# Patient Record
Sex: FEMALE | Race: WHITE | NOT HISPANIC OR LATINO | Employment: OTHER | URBAN - METROPOLITAN AREA
[De-identification: names, ages, dates, MRNs, and addresses within clinical notes are randomized per-mention and may not be internally consistent; named-entity substitution may affect disease eponyms.]

---

## 2017-05-22 ENCOUNTER — CONVERSION ENCOUNTER (OUTPATIENT)
Dept: OTHER | Facility: HOSPITAL | Age: 52
End: 2017-05-22

## 2018-01-26 ENCOUNTER — OFFICE VISIT CONVERTED (OUTPATIENT)
Dept: FAMILY MEDICINE CLINIC | Age: 53
End: 2018-01-26
Attending: FAMILY MEDICINE

## 2018-06-27 ENCOUNTER — CONVERSION ENCOUNTER (OUTPATIENT)
Dept: OTHER | Facility: HOSPITAL | Age: 53
End: 2018-06-27

## 2018-06-27 ENCOUNTER — OFFICE VISIT CONVERTED (OUTPATIENT)
Dept: FAMILY MEDICINE CLINIC | Age: 53
End: 2018-06-27
Attending: FAMILY MEDICINE

## 2019-04-10 ENCOUNTER — OFFICE VISIT CONVERTED (OUTPATIENT)
Dept: FAMILY MEDICINE CLINIC | Age: 54
End: 2019-04-10
Attending: FAMILY MEDICINE

## 2019-04-10 ENCOUNTER — HOSPITAL ENCOUNTER (OUTPATIENT)
Dept: OTHER | Facility: HOSPITAL | Age: 54
Discharge: HOME | End: 2019-04-10
Attending: FAMILY MEDICINE

## 2019-04-10 LAB
ALBUMIN SERPL-MCNC: 4.3 G/DL (ref 3.5–5)
ALBUMIN/GLOB SERPL: 1.4 {RATIO} (ref 1.4–2.6)
ALP SERPL-CCNC: 98 U/L (ref 53–141)
ALT SERPL-CCNC: 14 U/L (ref 10–40)
ANION GAP SERPL CALC-SCNC: 16 MMOL/L (ref 8–19)
AST SERPL-CCNC: 16 U/L (ref 15–50)
BILIRUB SERPL-MCNC: 0.29 MG/DL (ref 0.2–1.3)
BUN SERPL-MCNC: 13 MG/DL (ref 5–25)
BUN/CREAT SERPL: 16 {RATIO} (ref 6–20)
CALCIUM SERPL-MCNC: 8.9 MG/DL (ref 8.7–10.4)
CHLORIDE SERPL-SCNC: 101 MMOL/L (ref 99–111)
CONV CO2: 27 MMOL/L (ref 22–32)
CONV TOTAL PROTEIN: 7.3 G/DL (ref 6.3–8.2)
CREAT UR-MCNC: 0.81 MG/DL (ref 0.5–0.9)
GFR SERPLBLD BASED ON 1.73 SQ M-ARVRAT: >60 ML/MIN/{1.73_M2}
GLOBULIN UR ELPH-MCNC: 3 G/DL (ref 2–3.5)
GLUCOSE SERPL-MCNC: 93 MG/DL (ref 65–99)
OSMOLALITY SERPL CALC.SUM OF ELEC: 288 MOSM/KG (ref 273–304)
POTASSIUM SERPL-SCNC: 4.5 MMOL/L (ref 3.5–5.3)
SODIUM SERPL-SCNC: 139 MMOL/L (ref 135–147)

## 2019-05-03 ENCOUNTER — HOSPITAL ENCOUNTER (OUTPATIENT)
Dept: PHYSICAL THERAPY | Facility: CLINIC | Age: 54
Setting detail: RECURRING SERIES
Discharge: HOME | End: 2019-09-16
Attending: FAMILY MEDICINE

## 2019-06-19 ENCOUNTER — OFFICE VISIT CONVERTED (OUTPATIENT)
Dept: OTOLARYNGOLOGY | Facility: CLINIC | Age: 54
End: 2019-06-19
Attending: OTOLARYNGOLOGY

## 2019-07-16 ENCOUNTER — HOSPITAL ENCOUNTER (OUTPATIENT)
Dept: OTHER | Facility: HOSPITAL | Age: 54
Discharge: HOME | End: 2019-07-16
Attending: FAMILY MEDICINE

## 2019-09-25 ENCOUNTER — OFFICE VISIT CONVERTED (OUTPATIENT)
Dept: OTOLARYNGOLOGY | Facility: CLINIC | Age: 54
End: 2019-09-25
Attending: OTOLARYNGOLOGY

## 2019-12-10 ENCOUNTER — OFFICE VISIT CONVERTED (OUTPATIENT)
Dept: FAMILY MEDICINE CLINIC | Age: 54
End: 2019-12-10
Attending: FAMILY MEDICINE

## 2019-12-10 ENCOUNTER — HOSPITAL ENCOUNTER (OUTPATIENT)
Dept: OTHER | Facility: HOSPITAL | Age: 54
Discharge: HOME | End: 2019-12-10
Attending: FAMILY MEDICINE

## 2019-12-10 LAB
ALBUMIN SERPL-MCNC: 4.2 G/DL (ref 3.5–5)
ALBUMIN/GLOB SERPL: 1.4 {RATIO} (ref 1.4–2.6)
ALP SERPL-CCNC: 106 U/L (ref 53–141)
ALT SERPL-CCNC: 17 U/L (ref 10–40)
ANION GAP SERPL CALC-SCNC: 14 MMOL/L (ref 8–19)
AST SERPL-CCNC: 18 U/L (ref 15–50)
BILIRUB SERPL-MCNC: 0.48 MG/DL (ref 0.2–1.3)
BUN SERPL-MCNC: 8 MG/DL (ref 5–25)
BUN/CREAT SERPL: 10 {RATIO} (ref 6–20)
CALCIUM SERPL-MCNC: 9.3 MG/DL (ref 8.7–10.4)
CHLORIDE SERPL-SCNC: 102 MMOL/L (ref 99–111)
CONV CO2: 26 MMOL/L (ref 22–32)
CONV TOTAL PROTEIN: 7.2 G/DL (ref 6.3–8.2)
CREAT UR-MCNC: 0.81 MG/DL (ref 0.5–0.9)
GFR SERPLBLD BASED ON 1.73 SQ M-ARVRAT: >60 ML/MIN/{1.73_M2}
GLOBULIN UR ELPH-MCNC: 3 G/DL (ref 2–3.5)
GLUCOSE SERPL-MCNC: 100 MG/DL (ref 65–99)
OSMOLALITY SERPL CALC.SUM OF ELEC: 284 MOSM/KG (ref 273–304)
POTASSIUM SERPL-SCNC: 4 MMOL/L (ref 3.5–5.3)
SODIUM SERPL-SCNC: 138 MMOL/L (ref 135–147)

## 2020-04-09 ENCOUNTER — OFFICE VISIT CONVERTED (OUTPATIENT)
Dept: FAMILY MEDICINE CLINIC | Age: 55
End: 2020-04-09
Attending: FAMILY MEDICINE

## 2020-06-09 ENCOUNTER — OFFICE VISIT CONVERTED (OUTPATIENT)
Dept: FAMILY MEDICINE CLINIC | Age: 55
End: 2020-06-09
Attending: FAMILY MEDICINE

## 2020-07-15 ENCOUNTER — OFFICE VISIT CONVERTED (OUTPATIENT)
Dept: FAMILY MEDICINE CLINIC | Age: 55
End: 2020-07-15
Attending: FAMILY MEDICINE

## 2020-12-02 ENCOUNTER — HOSPITAL ENCOUNTER (OUTPATIENT)
Dept: OTHER | Facility: HOSPITAL | Age: 55
Discharge: HOME | End: 2020-12-02
Attending: FAMILY MEDICINE

## 2021-03-11 ENCOUNTER — OFFICE VISIT CONVERTED (OUTPATIENT)
Dept: FAMILY MEDICINE CLINIC | Age: 56
End: 2021-03-11
Attending: FAMILY MEDICINE

## 2021-04-01 ENCOUNTER — HOSPITAL ENCOUNTER (OUTPATIENT)
Dept: OTHER | Facility: HOSPITAL | Age: 56
Discharge: HOME | End: 2021-04-01
Attending: FAMILY MEDICINE

## 2021-04-01 LAB
25(OH)D3 SERPL-MCNC: 38.4 NG/ML (ref 30–100)
ALBUMIN SERPL-MCNC: 4.3 G/DL (ref 3.5–5)
ALBUMIN/GLOB SERPL: 1.4 {RATIO} (ref 1.4–2.6)
ALP SERPL-CCNC: 103 U/L (ref 53–141)
ALT SERPL-CCNC: 21 U/L (ref 10–40)
ANION GAP SERPL CALC-SCNC: 13 MMOL/L (ref 8–19)
AST SERPL-CCNC: 18 U/L (ref 15–50)
BILIRUB SERPL-MCNC: 0.26 MG/DL (ref 0.2–1.3)
BUN SERPL-MCNC: 11 MG/DL (ref 5–25)
BUN/CREAT SERPL: 15 {RATIO} (ref 6–20)
CALCIUM SERPL-MCNC: 9.4 MG/DL (ref 8.7–10.4)
CHLORIDE SERPL-SCNC: 102 MMOL/L (ref 99–111)
CHOLEST SERPL-MCNC: 169 MG/DL (ref 107–200)
CHOLEST/HDLC SERPL: 1.9 {RATIO} (ref 3–6)
CONV CO2: 27 MMOL/L (ref 22–32)
CONV TOTAL PROTEIN: 7.3 G/DL (ref 6.3–8.2)
CREAT UR-MCNC: 0.71 MG/DL (ref 0.5–0.9)
GFR SERPLBLD BASED ON 1.73 SQ M-ARVRAT: >60 ML/MIN/{1.73_M2}
GLOBULIN UR ELPH-MCNC: 3 G/DL (ref 2–3.5)
GLUCOSE SERPL-MCNC: 90 MG/DL (ref 65–99)
HDLC SERPL-MCNC: 88 MG/DL (ref 40–60)
LDLC SERPL CALC-MCNC: 36 MG/DL (ref 70–100)
OSMOLALITY SERPL CALC.SUM OF ELEC: 285 MOSM/KG (ref 273–304)
POTASSIUM SERPL-SCNC: 4.3 MMOL/L (ref 3.5–5.3)
SODIUM SERPL-SCNC: 138 MMOL/L (ref 135–147)
TRIGL SERPL-MCNC: 226 MG/DL (ref 40–150)
VLDLC SERPL-MCNC: 45 MG/DL (ref 5–37)

## 2021-05-15 VITALS
WEIGHT: 174.37 LBS | HEIGHT: 63 IN | TEMPERATURE: 97.3 F | OXYGEN SATURATION: 97 % | HEART RATE: 90 BPM | BODY MASS INDEX: 30.89 KG/M2 | RESPIRATION RATE: 18 BRPM | SYSTOLIC BLOOD PRESSURE: 112 MMHG | DIASTOLIC BLOOD PRESSURE: 60 MMHG

## 2021-05-15 VITALS
HEART RATE: 88 BPM | OXYGEN SATURATION: 97 % | SYSTOLIC BLOOD PRESSURE: 116 MMHG | WEIGHT: 175 LBS | DIASTOLIC BLOOD PRESSURE: 53 MMHG | RESPIRATION RATE: 18 BRPM | TEMPERATURE: 97.9 F | HEIGHT: 63 IN | BODY MASS INDEX: 31.01 KG/M2

## 2021-05-18 NOTE — PROGRESS NOTES
Vanesa Esquivel  1965     Office/Outpatient Visit    Visit Date: Tue, Dec 10, 2019 11:05 am    Provider: Fina Bardales MD (Assistant: Janel Campuzano MA)    Location: [facility]        Electronically signed by Fina Bardales MD on  12/11/2019 10:49:52 AM                             Subjective:        CC: Ms. Esquivel is a 54 year old White female.  This is a follow-up visit.          HPI:       Vanesa is following up for her fibromyalgia and depression, she has chronic pain and depression, she is overall doing better, worried that the citalopram is making her memory worse.  She fatigues easily still.  Denies crying spells, feelings of guilt and worthlessness, suicidal ideation.  Her anxiety is stable.      chronic LBP and OA joint pain, she is overall stable and doing ok, on CBD oil and this is helping      eyes are itching B, onset yesterday at a house she is renevating, a lot of dust    ROS:     CONSTITUTIONAL:  Positive for fatigue and unintentional weight gain.   Negative for chills or fever.      EYES:  Negative for blurred vision.      E/N/T:  Positive for tinnitus and choking.   Negative for nasal congestion, frequent rhinorrhea or sore throat.      CARDIOVASCULAR:  Positive for palpitations ( (fluttering) ).   Negative for chest pain.      RESPIRATORY:  Negative for recent cough and dyspnea.      GASTROINTESTINAL:  Positive for acid reflux symptoms.   Negative for constipation, diarrhea, nausea or vomiting.      GENITOURINARY:  Negative for dysuria and vaginal discharge.      INTEGUMENTARY/BREAST:  Negative for rash.      NEUROLOGICAL:  Positive for dizziness, headaches and vertigo.      PSYCHIATRIC:  Positive for sleep disturbance.   Negative for anxiety or depression.          Past Medical History / Family History / Social History:         Last Reviewed on 12/10/2019 11:20 AM by Fina Bardales    Past Medical History: Fibromyalgia, GERD, chronic LBP, depression              PREVENTIVE HEALTH MAINTENANCE             MAMMOGRAM: Done within last 2 years and results in are chart was last done 7-17-19 with normal results         Surgical History:         Hysterectomy: (abn PAP);         Family History:         Positive for Myocardial Infarction ( mat. GM );     Positive for Cancer- type not specified ( pat. GF; mat. GF );     Positive for Cerebrovascular Accident ( pat. GM );         Social History:         Marital Status:      Children: 3 children and 2 step-children         Tobacco/Alcohol/Supplements:     Last Reviewed on 12/10/2019 11:10 AM by Janel Campuzano    Tobacco: She has never smoked.          Allergies:     Last Reviewed on 4/10/2019 01:28 PM by Janel Campuzano    No Known Allergies.        Current Medications:     Last Reviewed on 4/10/2019 01:29 PM by Janel Campuzano    Mucinex Maximum Strength 1,200mg Tablets, Extended Release [1 tab bid]    flax seed oil 1 QD     Fish Oil     vitamin E mixed 400 unit oral tablet [1 tab daily]    Pantoprazole 40 mg oral tablet, delayed release (enteric coated) [1 po q day]    citalopram 40 mg oral tablet [1 tab daily]    Benadryl Allergy 25 mg oral tablet [1 at hs]    CBD oil q day         Objective:        Vitals:         Current: 12/10/2019 11:14:37 AM    Ht:  5 ft, 4.5 in;  Wt: 171 lbs;  BMI: 28.9T: 98 F (oral);  BP: 95/46 mm Hg (left arm, sitting);  P: 85 bpm (left arm (BP Cuff), sitting);  sCr: 0.81 mg/dL;  GFR: 84.63        Repeat:     11:16:12 AM  BP:   105/41mm Hg (left arm, sitting, HR)     Exams:     PHYSICAL EXAM:     GENERAL:  well developed and nourished; appropriately groomed; in no apparent distress;     EYES: PERRL, EOMI     E/N/T:  normal EACs, TMs, nasal/oral mucosa, teeth, gingiva, and oropharynx;     NECK: range of motion is normal;     RESPIRATORY: normal respiratory rate and pattern with no distress; normal breath sounds with no rales, rhonchi, wheezes or rubs;     CARDIOVASCULAR: regular rate and rhythm;  normal S1, S2; no murmur, rub, or gallop; normal PMI;     BREAST/INTEGUMENT: smalll atypical erythematous lesion on tip of her nose;     MUSCULOSKELETAL: normal gait; muscle strength: 5/5 in all major muscle groups;  normal overall tone neg straight leg raise     NEUROLOGIC: mental status: oriented to person, place, and time;  cranial nerves II-XII grossly intact; Reflexes: knee jerks: 3+;     PSYCHIATRIC:  appropriate affect and demeanor; normal speech pattern; grossly normal memory;         Assessment:         F32.0   Major depressive disorder, single episode, mild       M54.5   Low back pain       K21.9   Gastro-esophageal reflux disease without esophagitis       M15.0   Primary generalized (osteo)arthritis       M79.7   Fibromyalgia       H10.13   Acute atopic conjunctivitis, bilateral       L29.8   Other pruritus           ORDERS:         Meds Prescribed:       [Recorded] Lexapro 20 mg oral tablet [take 1/2  tablet (20 mg) by oral route once daily week 1 then increase to 1 pill daily]       [Refilled] Lexapro 20 mg oral tablet [take 1/2  tablet (20 mg) by oral route once daily week 1 then increase to 1 pill daily], #90 (ninety) tablets, Refills: 0 (zero)         Lab Orders:       [id number]  COMP - [facility] Comp. Metabolic Panel  (Send-Out)                      Plan:         Major depressive disorder, single episode, mildoverall chronic and stableshe is worried her med is causing her memory issues, I dont think so, but will change to lexapro and see if this improves          Prescriptions:       [Recorded] Lexapro 20 mg oral tablet [take 1/2  tablet (20 mg) by oral route once daily week 1 then increase to 1 pill daily]       [Refilled] Lexapro 20 mg oral tablet [take 1/2  tablet (20 mg) by oral route once daily week 1 then increase to 1 pill daily], #90 (ninety) tablets, Refills: 0 (zero)         Low back painchronic LBP and OA joint pain, she is overall stable and doing ok, on CBD oil and this is helping         Gastro-esophageal reflux disease without esophagitisoff all meds        Primary generalized (osteo)arthritischronic LBP and OA joint pain, she is overall stable and doing ok, on CBD oil and this is helping        Fibromyalgiaoverall chronic and stable        Acute atopic conjunctivitis, bilateralWILL START ALLERGY DROP, she is to go to optometrist for full exam if she has vision loss or eye pain        Other prurituswill check liver labs, she should change her soap to moisturizing soap, and dye/perfume free detergents. good lotion like eucerin or aveeno    LABORATORY:  Labs ordered to be performed today include Comprehensive metabolic panel.            Orders:       [id number]  COMP - [facility] Comp. Metabolic Panel  (Send-Out)                  Charge Capture:         Primary Diagnosis:     F32.0  Major depressive disorder, single episode, mild           Orders:      [id number]  Office/outpatient visit; established patient, level 4  (In-House)              M54.5  Low back pain     K21.9  Gastro-esophageal reflux disease without esophagitis     M15.0  Primary generalized (osteo)arthritis     M79.7  Fibromyalgia     H10.13  Acute atopic conjunctivitis, bilateral     L29.8  Other pruritus

## 2021-05-18 NOTE — PROGRESS NOTES
Vanesa Esquivel 1965     Office/Outpatient Visit    Visit Date: Fri, Jan 26, 2018 02:09 pm    Provider: Fina Bardales MD (Assistant: Bozena Bailey RN)    Location: [facility]        Electronically signed by Fina Bardales MD on  01/31/2018 03:14:45 PM                             SUBJECTIVE:        CC: fibromyalgia follow up         HPI:     GERD is overall stable on protonix     Vanesa is in today to follow up on her chronic fibromyalgia/LBP and neck pain.  She takes celexa and flexeril (off this med lately and at nighttime only), with prn tylenol, and she is overall stable and doing well.   Failed treatments include:  savella, zoloft, cymbalta, pristiq, wellbutrin, gabapentin, lyrica, trazodone and muccinex.   Her depression is a little worse, she recently loss her father and dog, so she has been grieving.  She has rare crying spells.   Motivation is fine.  She is sleeping ok with benadryl OTC.  She denies suicidal thoughts.     chronic eczema and this is getting worse with dry skin, leona in trunk area, last cream I gave her didnt help and even burned, she uses moisturizing soaps and OTC lotions routinely.  I recommend dye and perfume free clothes detergent.     ROS:     CONSTITUTIONAL:  Negative for chills and fever.      EYES:  Negative for blurred vision, eye pain, and photophobia.      CARDIOVASCULAR:  Negative for chest pain, dizziness and palpitations.      RESPIRATORY:  Negative for cough, dyspnea, and hemoptysis.      GASTROINTESTINAL:  Positive for nausea.   Negative for abdominal pain, constipation, diarrhea or vomiting.      MUSCULOSKELETAL:  Negative for arthralgias, back pain, and myalgias.      NEUROLOGICAL:  Negative for dizziness, headaches, paresthesias, and weakness.      PSYCHIATRIC:  Negative for sleep disturbance and suicidal thoughts.          PMH/FMH/SH:     Last Reviewed on 5/22/2017 01:07 PM by Fina Bardales    Past Medical History: Fibromyalgia, GERD, chronic LBP,  depression         Surgical History:         Hysterectomy: (abn PAP);         Family History:         Positive for Myocardial Infarction ( mat. GM );     Positive for Cancer- type not specified ( pat. GF; mat. GF );     Positive for Cerebrovascular Accident ( pat. GM );         Social History:         Marital Status:      Children: 3 children and 2 step-children         Tobacco/Alcohol/Supplements:     Last Reviewed on 5/22/2017 01:07 PM by Fina Bardales    Tobacco: She has never smoked.              Allergies:     Last Reviewed on 1/26/2018 02:13 PM by Bozena Bailey      No Known Drug Allergies.         Current Medications:     Last Reviewed on 1/26/2018 02:13 PM by Bozena Bailey    Mucinex Maximum Strength 1,200mg Tablets, Extended Release 1 tab bid     Citalopram Hydrobromide 40mg Tablet 1 1/2 tab daily     Pantoprazole 40mg Tablets, Delayed Release 1 po q day     Claritin Allergy 24 hr     Fish Oil     Vitamin E 400IU Tablets 1 tab daily     flax seed oil 1 QD         OBJECTIVE:        Vitals:         Current: 1/26/2018 2:13:07 PM    Ht:  5 ft, 4.5 in;  Wt: 172 lbs;  BMI: 29.1    T: 97.9 F (oral);  BP: 115/70 mm Hg (left arm, sitting);  P: 97 bpm (left arm (BP Cuff), sitting);  sCr: 0.68 mg/dL;  GFR: 103.32        Exams:     PHYSICAL EXAM:     GENERAL:  well developed and nourished; appropriately groomed; in no apparent distress;     EYES: PERRL, EOMI     E/N/T:  normal EACs, TMs, nasal/oral mucosa, teeth, gingiva, and oropharynx;     NECK: range of motion is normal;     RESPIRATORY: CTA B WITHOUT WHEEZING/RALES OR RHONCHI, NORMAL RESP. EFFORT     CARDIOVASCULAR: regular rate and rhythm; normal S1, S2; no murmur, rub, or gallop; normal PMI;     MUSCULOSKELETAL: muscle strength: 5/5 in all major muscle groups;  normal overall tone     skin-dry on back and abdomen with erythema and thickening in certain sections of abdomen     NEUROLOGICAL:  cranial nerves, motor and sensory function, reflexes,  gait and coordination are all intact;     PSYCHIATRIC:  appropriate affect and demeanor; normal speech pattern; grossly normal memory;         ASSESSMENT           724.2   M54.5  Low back pain              DDx:     530.81   K21.9  GERD              DDx:     296.22   F32.0  Moderate depression              DDx:     729.1   M79.7  Fibromyalgia              DDx:     691.8   L20.89  Eczema              DDx:         ORDERS:         Meds Prescribed:       Refill of: Citalopram Hydrobromide 40mg Tablet 1 1/2 tab daily  #135 ([address] [place] [address]) tablet(s) Refills: 1       Refill of: Pantoprazole 40mg Tablets, Delayed Release 1 po q day  #90 (Ninety) tablet(s) Refills: 1       Refill of: Betamethasone Diproprionate, Augmented 0.05% Cream Apply thin film to affected area bid  #45 (Forty Five) gm Refills: 0       Refill of: Mucinex Maximum Strength (Guaifenesin) 1,200mg Tablets, Extended Release 1 tab bid  #60 (Sixty) tablet(s) Refills: 5         Lab Orders:       [id number]  [facility] Comp. Metabolic Panel  (Send-Out)           Other Orders:       [id number]  Behav assmt w/score & docd/stand instrument  (In-House)                   PLAN:          Low back pain stable on prn flexeril           Prescriptions:       Refill of: Betamethasone Diproprionate, Augmented 0.05% Cream Apply thin film to affected area bid  #45 ([place]) gm Refills: 0       Refill of: Mucinex Maximum Strength (Guaifenesin) 1,200mg Tablets, Extended Release 1 tab bid  #60 (Sixty) tablet(s) Refills: 5          GERD stable on protonix          Moderate depression     LABORATORY:  Labs ordered to be performed today include Comprehensive metabolic panel.  [facility] PHQ-9 Depression Screening: Completed form scanned and in chart; Total Score 8/27           Orders:       [id number]  [facility] Comp. Metabolic Panel  (Send-Out)         [id number]  Behav assmt w/score & docd/stand instrument  (In-House)             Patient Education Handouts:       Mental Health and Substance  Abuse Services in [facility]           Fibromyalgia she thinks she is stable at this time, cont current meds          Eczema she uses moisturizing soaps and OTC lotions routinely.  I recommend dye and perfume free clothes detergent.  She was worse on lac hydrins, so I will try a different topical steroid-betamethazone           Prescriptions:       Refill of: Citalopram Hydrobromide 40mg Tablet 1 1/2 tab daily  #135 ([address] [place] [address]) tablet(s) Refills: 1       Refill of: Pantoprazole 40mg Tablets, Delayed Release 1 po q day  #90 (Ninety) tablet(s) Refills: 1             CHARGE CAPTURE           **Please note: ICD descriptions below are intended for billing purposes only and may not represent clinical diagnoses**        Primary Diagnosis:         724.2 Low back pain            M54.5    Low back pain              Orders:          [id number]   Office/outpatient visit; established patient, level 4  (In-House)           530.81 GERD            K21.9    Gastro-esophageal reflux disease without esophagitis    296.22 Moderate depression            F32.0    Major depressive disorder, single episode, mild              Orders:          [id number]   Behav assmt w/score & docd/stand instrument  (In-House)           729.1 Fibromyalgia            M79.7    Fibromyalgia    691.8 Eczema            L20.89    Other atopic dermatitis

## 2021-05-18 NOTE — PROGRESS NOTES
Vanesa Esquivel 1965     Office/Outpatient Visit    Visit Date: Wed, Apr 10, 2019 01:27 pm    Provider: Fina Bardales MD (Assistant: Janel Campuzano MA)    Location: [facility]        Electronically signed by Fina Bardales MD on  04/18/2019 04:16:33 PM                             SUBJECTIVE:        CC:     Ms. Esquivel is a 53 year old White female.  This is a follow-up visit.          HPI:     Saw Dr Garay for back adjustment back in July and has seen him multiple times since. Helps some but pain is still severe. Low back pain and neck pain- 8/10 when it flares. Wondering about surgical options.  She states she cant take ibuprofen (due to kidney insufficiency) or tylenol (upsets her stopmach).  She is wanting to get surgery.        Fibromyalgia pain more under control. 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 Depression has been better.     Had barium swallow last week. Did not see any obvious source of blockage. Still choking once a week- can be any food or drinks.      Still taking pantoprazole for acid reflux which helps with symptoms. Wondering about H pylori testing. Feels like the problem is more with her stomach- has 1-3 BMs per day. No diarrhea.            Tinnitus off and on for years, mostly at night. Also had some vertigo/dizziness a few months ago, which was very scary.                  Scalp lesion seems to have gone away. Also has a concerning red spot on her nose.     ROS:     CONSTITUTIONAL:  Positive for fatigue and unintentional weight gain.   Negative for chills or fever.      EYES:  Negative for blurred vision.      E/N/T:  Positive for tinnitus and choking.   Negative for nasal congestion, frequent rhinorrhea or sore throat.      CARDIOVASCULAR:  Positive for palpitations ( (fluttering) ).   Negative for chest pain.      RESPIRATORY:  Negative for recent cough and dyspnea.      GASTROINTESTINAL:  Positive for acid reflux symptoms.   Negative for constipation,  diarrhea, nausea or vomiting.      GENITOURINARY:  Negative for dysuria and vaginal discharge.      INTEGUMENTARY/BREAST:  Negative for rash.      NEUROLOGICAL:  Positive for dizziness, headaches and vertigo.      PSYCHIATRIC:  Positive for sleep disturbance.   Negative for anxiety or depression.          PMH/FMH/SH:     Last Reviewed on 4/10/2019 02:15 PM by Fina Bardales    Past Medical History: Fibromyalgia, GERD, chronic LBP, depression             PREVENTIVE HEALTH MAINTENANCE             MAMMOGRAM: Done within last 2 years and results in are chart was last done 6-28-18         Surgical History:         Hysterectomy: (abn PAP);         Family History:         Positive for Myocardial Infarction ( mat. GM );     Positive for Cancer- type not specified ( pat. GF; mat. GF );     Positive for Cerebrovascular Accident ( pat. GM );         Social History:         Marital Status:      Children: 3 children and 2 step-children         Tobacco/Alcohol/Supplements:     Last Reviewed on 4/10/2019 02:15 PM by Fina Bardales    Tobacco: She has never smoked.              Allergies:     Last Reviewed on 6/27/2018 02:43 PM by Kellee Perry      No Known Drug Allergies.         Current Medications:     Last Reviewed on 6/27/2018 02:44 PM by Kellee Perry    Citalopram Hydrobromide 40mg Tablet 1 tab daily     Pantoprazole 40mg Tablets, Delayed Release 1 po q day     Mucinex Maximum Strength 1,200mg Tablets, Extended Release 1 tab bid     Benadryl Allergy 25mg Tablet 1 at hs     Fish Oil     Vitamin E 400IU Tablets 1 tab daily     flax seed oil 1 QD         OBJECTIVE:        Vitals:         Current: 4/10/2019 1:31:52 PM    Ht:  5 ft, 4.5 in;  Wt: 178.6 lbs;  BMI: 30.2    T: 98.1 F (oral);  BP: 110/53 mm Hg (right arm, sitting);  P: 82 bpm (right arm (BP Cuff), sitting);  sCr: 0.61 mg/dL;  GFR: 115.76        Exams:     PHYSICAL EXAM:     GENERAL:  well developed and nourished; appropriately groomed;  in no apparent distress;     EYES: PERRL, EOMI     E/N/T:  normal EACs, TMs, nasal/oral mucosa, teeth, gingiva, and oropharynx;     NECK: range of motion is normal;     RESPIRATORY: CTA B WITHOUT WHEEZING/RALES OR RHONCHI, NORMAL RESP. EFFORT     CARDIOVASCULAR: regular rate and rhythm; normal S1, S2; no murmur, rub, or gallop; normal PMI;     BREAST/INTEGUMENT: smalll atypical erythematous lesion on tip of her nose;     MUSCULOSKELETAL: normal gait; muscle strength: 5/5 in all major muscle groups;  normal overall tone neg straight leg raise     NEUROLOGIC: mental status: oriented to person, place, and time;  cranial nerves II-XII grossly intact; Reflexes: knee jerks: 3+;     PSYCHIATRIC:  appropriate affect and demeanor; normal speech pattern; grossly normal memory;         ASSESSMENT           724.2   M54.5  Low back pain              DDx:     300.4   F41.8  Anxiety with depression              DDx:     787.29   R13.19  Other dysphagia              DDx:     729.1   M79.7  Fibromyalgia              DDx:     530.81   K21.9  GERD              DDx:     388.30   H93.19  Tinnitus              DDx:     238.2   L73.8  Atypical skin lesion              DDx:     723.1   M54.2  Neck pain              DDx:         ORDERS:         Radiology/Test Orders:       [id number]  Radiologic examination, spine, cervical; minimum of four views  (Send-Out)         [id number]  Radiologic examination, spine, lumbosacral;  minimum of four views  (Send-Out)         [id number]  Radiologic examination, spine; thoracic, three views  (Send-Out)           Lab Orders:       [id number]  COMP - [facility] Comp. Metabolic Panel  (Send-Out)         74223  HPUBT - [facility] H.pylori Breath test  (Send-Out)           Procedures Ordered:       REFER  Referral to Specialist or Other Facility  (Send-Out)                   PLAN:          Low back pain referal for PT and xrays ordered,, PE c/w arthritis         RADIOLOGY:  I have ordered a C-Spine x-ray series, Lumbar/Sacral Spine X-ray, and  Thoracic Spine to be done today.      REFERRALS:  Referral initiated to physical therapy ( [facility] ).            Orders:       [id number]  Radiologic examination, spine, cervical; minimum of four views  (Send-Out)         45251  Radiologic examination, spine, lumbosacral;  minimum of four views  (Send-Out)         42030  Radiologic examination, spine; thoracic, three views  (Send-Out)         REFER  Referral to Specialist or Other Facility  (Send-Out)            Anxiety with depression stable at this time, she is on citalopram and doing ok, not sleeping well          Other dysphagia normal swallow study per pt, and normal EGD 2017         REFERRALS:  Referral initiated to an E/N/T ( Dr. Chinedu Woodall, [facility] ).           Fibromyalgia stable on meds          GERD     LABORATORY:  Labs ordered to be performed today include Comprehensive metabolic panel and H.pylori urea breath test ([facility]).            Orders:       [id number]  COMP - [facility] Comp. Metabolic Panel  (Send-Out)         [id number]  HPUBT - [facility] H.pylori Breath test  (Send-Out)            Tinnitus eval with ENT          Atypical skin lesion small atypical red spot on tip of nose-referal to derm         REFERRALS:  Referral initiated to a dermatologist ( Dr. Amparo Briones ).           Neck pain referal for PT and xray             CHARGE CAPTURE           **Please note: ICD descriptions below are intended for billing purposes only and may not represent clinical diagnoses**        Primary Diagnosis:         724.2 Low back pain            M54.5    Low back pain              Orders:          [id number]   Office/outpatient visit; established patient, level 4  (In-House)           300.4 Anxiety with depression            F41.8    Other specified anxiety disorders    787.29 Other dysphagia            R13.19    Other dysphagia    729.1 Fibromyalgia            M79.7    Fibromyalgia    530.81 GERD            K21.9    Gastro-esophageal reflux disease without  esophagitis    388.30 Tinnitus            H93.19    Tinnitus, unspecified ear    238.2 Atypical skin lesion            L73.8    Other specified follicular disorders    723.1 Neck pain            M54.2    Cervicalgia

## 2021-05-18 NOTE — PROGRESS NOTES
Vanesa Esquivel  1965     Office/Outpatient Visit    Visit Date: Wed, Jul 15, 2020 11:18 am    Provider: Fina Bardales MD (Assistant: Janel Campuzano MA)    Location: [facility]        Electronically signed by Fina Bardales MD on  07/21/2020 02:13:07 PM                             Subjective:        CC: Ms. Esquivel is a 55 year old White female.  She presents with raw throat, swelling in throat, pressure in chest, choking when eating, lump in the roof of mouth, abnormal heart beat.          HPI:       she feels like she has a lump in the roof of her mouth with a soreness, and ongoing choking.  She is on nexium 20 mg daily and still feels like she has acid in back of her throat.  Her swallow study done in  was all ok.  She also is having intermittent fast heart rate with palpitations.  She has chronic anxiety and she is overall stable and coping well.  States she is stressed over covid and her daughters who are working on the front line.    ROS:     CONSTITUTIONAL:  Positive for fatigue and unintentional weight gain.   Negative for chills or fever.      E/N/T:  Positive for choking.   Negative for ear pain, nasal congestion, frequent rhinorrhea or sore throat.      CARDIOVASCULAR:  Positive for palpitations ( (fluttering) ).   Negative for chest pain.      RESPIRATORY:  Negative for recent cough and dyspnea.      GASTROINTESTINAL:  Positive for acid reflux symptoms.   Negative for constipation, diarrhea, nausea or vomiting.      INTEGUMENTARY/BREAST:  Negative for rash.      PSYCHIATRIC:  Positive for sleep disturbance.   Negative for anxiety or depression.          Past Medical History / Family History / Social History:         Last Reviewed on 7/15/2020 11:45 AM by Fina Bardales    Past Medical History: Fibromyalgia, GERD, chronic LBP, depression             PREVENTIVE HEALTH MAINTENANCE             MAMMOGRAM: Done within last 2 years and results in are chart was last done 7-17-19  with normal results         Surgical History:         Hysterectomy: (abn PAP);         Family History:         Positive for Myocardial Infarction ( mat. GM );     Positive for Cancer- type not specified ( pat. GF; mat. GF );     Positive for Cerebrovascular Accident ( pat. GM );         Social History:         Marital Status:      Children: 3 children and 2 step-children         Tobacco/Alcohol/Supplements:     Last Reviewed on 7/15/2020 11:22 AM by Janel Campuzano    Tobacco: She has never smoked.          Allergies:     Last Reviewed on 6/09/2020 11:27 AM by Leni Doyle    No Known Allergies.        Current Medications:     Last Reviewed on 6/09/2020 11:27 AM by Leni Doyle    flax seed oil 1 QD     Benadryl Allergy 25 mg oral tablet [1 at hs]    CBD oil q day     ALCLOMETASONE 0.05% CREAM 45GM  [APPLY TO AREA QD AS NEEDED]    escitalopram oxalate 20 mg oral tablet [1 TABLET BY MOUTH EVERY DAY]    colestipoL 1 gram oral tablet [take 1 tablet (1 gram) by oral route bid]    esomeprazole magnesium 40 mg oral capsule,delayed release (enteric coated) [take 1 capsule (40 mg) by oral route  daily]    busPIRone 10 mg oral tablet [take 1 tablet (10 mg) by oral route 2 times per day]        Objective:        Vitals:         Current: 7/15/2020 11:25:37 AM    Ht:  5 ft, 4.5 in;  Wt: 175.6 lbs;  BMI: 29.7T: 97.5 F (oral);  BP: 104/59 mm Hg (left arm, sitting);  P: 89 bpm (left arm (BP Cuff), sitting);  sCr: 0.81 mg/dL;  GFR: 84.64        Exams:     PHYSICAL EXAM:     GENERAL: vital signs recorded - well developed, well nourished;  well groomed;  no apparent distress;     EYES: PERRL, EOMI     E/N/T:  normal EACs, TMs, nasal/oral mucosa, teeth, gingiva, and oropharynx;     NECK: supple, FROM, no LAD/CB;     RESPIRATORY: normal respiratory rate and pattern with no distress;     CARDIOVASCULAR: regular rate and rhythm; normal S1, S2; no murmur, rub, or gallop; normal PMI;     GASTROINTESTINAL: nontender,  nondistended; no hepatosplenomegaly or masses; no bruits;     NEUROLOGIC: mental status: oriented to person, place, and time;  GROSSLY INTACT     PSYCHIATRIC:  appropriate affect and demeanor; normal speech pattern; grossly normal memory;         Assessment:         J02.9   Acute pharyngitis, unspecified       R00.0   Tachycardia, unspecified       K21.9   Gastro-esophageal reflux disease without esophagitis       F41.8   Other specified anxiety disorders           ORDERS:         Radiology/Test Orders:       [id number]  Electrocardiogram, routine with at least 12 leads; with interpretation and report  (In-House)                      Plan:         Acute pharyngitis, unspecifiedwell worried, throat and soft palate are normal on PE, will treat GERD with increase in nexium dose        Tachycardia, unspecifiedshe defers BB at this time, she will call if it worsens and I will set her up for a 48 hour holter.        TESTS/PROCEDURES:  Will proceed with an ECG to be performed/scheduled now.            Orders:       [id number]  Electrocardiogram, routine with at least 12 leads; with interpretation and report  (In-House)              Gastro-esophageal reflux disease without esophagitisincrease nexium 20 mg to bid dosing        RECOMMENDATIONS given include: patient is aware of increased risk of kidney failure, osteoporosis and alzheimers with daily use of this med.          Other specified anxiety disordersoverall stable and doing well on buspar and lexapro            Charge Capture:         Primary Diagnosis:     J02.9  Acute pharyngitis, unspecified           Orders:      [id number]  Office/outpatient visit; established patient, level 4  (In-House)              R00.0  Tachycardia, unspecified           Orders:      [id number]  Electrocardiogram, routine with at least 12 leads; with interpretation and report  (In-House)              K21.9  Gastro-esophageal reflux disease without esophagitis     F41.8  Other specified anxiety disorders

## 2021-05-18 NOTE — PROGRESS NOTES
"Vanesa Esquivel  1965     Office/Outpatient Visit    Visit Date: Thu, Apr 9, 2020 11:24 am    Provider: Fina Bardales MD (Assistant: Leni Doyle MA)    Location: [facility]        Electronically signed by Fina Bardales MD on  04/15/2020 02:07:42 PM                             Subjective:        CC: FACETIME : [id number] heart burn    HPI:       her depression and axiety are worse with COVID pandemic, she sometimes feels overwhelmed and anxious, she is stressed about her  with his diabetes, she is laying low and staying home and he works, so she is more lonely and bored.  She is sleeping well, denies suicidal ideation.  She does have anhydonia and sadness.      acute worsening of CP mid sternum for past few months, she has chronic GERD and stopped her protonix b/c it \"did not make a difference\"  She states the pain is intermittent and burning and c/w her heartburn issues.  She still has her GB and is unaware is she has a hiatal hernia        Her fibromyalgia is overall stable and well controlled, her LBP is doing fine.      chronic tinnitis and she is frustrated that she continues to have symptoms, she has been to ENT and told no hearing or ear issues    ROS:     CONSTITUTIONAL:  Positive for fatigue and unintentional weight gain.   Negative for chills or fever.      EYES:  Negative for blurred vision.      E/N/T:  Positive for tinnitus.   Negative for nasal congestion, frequent rhinorrhea, hoarseness or sore throat.      CARDIOVASCULAR:  Negative for chest pain, palpitations, pedal edema and tachycardia.      RESPIRATORY:  Negative for recent cough and dyspnea.      GASTROINTESTINAL:  Positive for acid reflux symptoms.   Negative for constipation, diarrhea, nausea or vomiting.      GENITOURINARY:  Negative for urinary incontinence.      INTEGUMENTARY/BREAST:  Negative for rash.      NEUROLOGICAL:  Negative for dizziness and headaches.          Past Medical History / " Family History / Social History:         Last Reviewed on 12/10/2019 11:20 AM by Fina Bardales    Past Medical History: Fibromyalgia, GERD, chronic LBP, depression             PREVENTIVE HEALTH MAINTENANCE             MAMMOGRAM: Done within last 2 years and results in are chart was last done 7-17-19 with normal results         Surgical History:         Hysterectomy: (abn PAP);         Family History:         Positive for Myocardial Infarction ( mat. GM );     Positive for Cancer- type not specified ( pat. GF; mat. GF );     Positive for Cerebrovascular Accident ( pat. GM );         Social History:         Marital Status:      Children: 3 children and 2 step-children         Tobacco/Alcohol/Supplements:     Last Reviewed on 12/10/2019 11:10 AM by Janel Campuzano    Tobacco: She has never smoked.          Allergies:     Last Reviewed on 12/10/2019 11:10 AM by Janel Campuzano    No Known Allergies.        Current Medications:     Last Reviewed on 12/10/2019 11:20 AM by Fina Bardales    flax seed oil 1 QD     Benadryl Allergy 25 mg oral tablet [1 at hs]    CBD oil q day     ALCLOMETASONE 0.05% CREAM 45GM  [APPLY TO AREA QD AS NEEDED]    escitalopram oxalate 20 mg oral tablet [TAKE 1/2 TABLET BY MOUTH EVERY DAY WEEK 1 THEN INCREASE TO 1 TABLET BY MOUTH EVERY DAY]        Objective:        Vitals:         Current: 4/9/2020 12:40:50 PM    Ht:  5 ft, 4.5 in;  Wt: 171 lbs;  BMI: 28.9BP: 112/79 mm Hg (left arm, sitting);  sCr: 0.81 mg/dL;  GFR: 84.63        Exams:     PHYSICAL EXAM:     GENERAL: vital signs recorded - well developed, well nourished;  well groomed;  no apparent distress;     EYES: PERRL, EOMI     RESPIRATORY: normal respiratory rate and pattern with no distress;     NEUROLOGIC: mental status: oriented to person, place, and time;  GROSSLY INTACT     PSYCHIATRIC:  appropriate affect and demeanor; normal speech pattern; grossly normal memory;         Assessment:         F32.0   Major  depressive disorder, single episode, mild       M54.5   Low back pain       K21.9   Gastro-esophageal reflux disease without esophagitis       R07.1   Chest pain on breathing       H93.13   Tinnitus, bilateral           ORDERS:         Meds Prescribed:       [Refilled] escitalopram oxalate 20 mg oral tablet [1 TABLET BY MOUTH EVERY DAY], #90 (ninety) tablets, Refills: 1 (one)       [Recorded] busPIRone 10 mg oral tablet [take 1 tablet (10 mg) by oral route 2 times per day]       [Recorded] esomeprazole magnesium 40 mg oral capsule,delayed release (enteric coated) [take 1 capsule (40 mg) by oral route  daily]       [Recorded] colestipoL 1 gram oral tablet [take 1 tablet (1 gram) by oral route daily]       [Refilled] busPIRone 10 mg oral tablet [take 1/2-1 tablet (10 mg) by oral route 2 times per day], #90 (ninety) tablets, Refills: 0 (zero)       [Refilled] esomeprazole magnesium 40 mg oral capsule,delayed release (enteric coated) [take 1 capsule (40 mg) by oral route  daily], #90 (ninety) capsules, Refills: 0 (zero)       [Refilled] colestipoL 1 gram oral tablet [take 1 tablet (1 gram) by oral route daily], #90 (ninety) tablets, Refills: 0 (zero)                 Plan:         Major depressive disorder, single episode, mildworse with COVID pandemic, will add buspar for her anxiety, f/u 3 months, sooner if no improvement or worsening symptoms,she is sleeping well and has no suicidal ideation    Telehealth: Verbal consent obtained for visit to occur via televideo conferencing; Staff, other than provider, present during telephone visit include Amparo Perry           Prescriptions:       [Refilled] escitalopram oxalate 20 mg oral tablet [1 TABLET BY MOUTH EVERY DAY], #90 (ninety) tablets, Refills: 1 (one)       [Recorded] busPIRone 10 mg oral tablet [take 1 tablet (10 mg) by oral route 2 times per day]       [Recorded] esomeprazole magnesium 40 mg oral capsule,delayed release (enteric coated) [take 1 capsule (40 mg) by  oral route  daily]       [Recorded] colestipoL 1 gram oral tablet [take 1 tablet (1 gram) by oral route daily]       [Refilled] busPIRone 10 mg oral tablet [take 1/2-1 tablet (10 mg) by oral route 2 times per day], #90 (ninety) tablets, Refills: 0 (zero)       [Refilled] esomeprazole magnesium 40 mg oral capsule,delayed release (enteric coated) [take 1 capsule (40 mg) by oral route  daily], #90 (ninety) capsules, Refills: 0 (zero)       [Refilled] colestipoL 1 gram oral tablet [take 1 tablet (1 gram) by oral route daily], #90 (ninety) tablets, Refills: 0 (zero)         Low back painoverall stable, not on any meds        Gastro-esophageal reflux disease without esophagitisworse, causing her CP, will treat with colestipol and nexium        Chest pain on breathingCP is due to worsening GERD, will treat with colestipol and nexium        Tinnitus, bilateralchronic tinnitis and she is frustrated that she continues to have symptoms, she has been to ENT and told no hearing or ear issues            Charge Capture:         Primary Diagnosis:     F32.0  Major depressive disorder, single episode, mild           Orders:      [id number]  Office/outpatient visit; established patient, level 4  (In-House)              M54.5  Low back pain     K21.9  Gastro-esophageal reflux disease without esophagitis     R07.1  Chest pain on breathing     H93.13  Tinnitus, bilateral

## 2021-05-18 NOTE — PROGRESS NOTES
Vanesa Esquivel  1965     Office/Outpatient Visit    Visit Date: Tue, Jun 9, 2020 11:26 am    Provider: Fina Bardales MD (Assistant: Leni Doyle MA)    Location: [facility]        Electronically signed by Fina Bardales MD on  06/16/2020 05:05:21 PM                             Subjective:        CC: FACETIME : [phone] - dox videoMs. Sierra is a 54 year old White female.  This is a follow-up visit.  anxiety and depression, heartburn    check up;         HPI:       I am following up with Vanesa regarding her ongoing fibromyalgia pain with  depression and anxiety, she is overall doing fine, she remains stressed over COVID pandemic and the economy, she is on lexapro 20 mg and buspar prn, she was suppose to increase this last OV due to her feeling overwhelmed and anxious with high stress, mild anhydonia and interrmittent crying spells,  but she never did.  She still has these feelings.   She is sleeping well, denies suicidal ideation.      she has a hiatal hernia and her chest pain is much better with the addition of colestipol and nexium, but she still is having afternoon burning in her chest.    ROS:     CONSTITUTIONAL:  Negative for chills and fever.      EYES:  Negative for blurred vision.      E/N/T:  Positive for tinnitus.   Negative for nasal congestion, frequent rhinorrhea, hoarseness or sore throat.      CARDIOVASCULAR:  Negative for chest pain, palpitations, pedal edema and tachycardia.      RESPIRATORY:  Negative for recent cough and dyspnea.      GASTROINTESTINAL:  Positive for acid reflux symptoms.   Negative for constipation, diarrhea, hematochezia, melena, nausea or vomiting.      INTEGUMENTARY/BREAST:  Negative for rash.      NEUROLOGICAL:  Negative for dizziness and headaches.          Past Medical History / Family History / Social History:         Last Reviewed on 6/09/2020 11:38 AM by Fina Bardales    Past Medical History: Fibromyalgia, GERD,  chronic LBP, depression             PREVENTIVE HEALTH MAINTENANCE             MAMMOGRAM: Done within last 2 years and results in are chart was last done 7-17-19 with normal results         Surgical History:         Hysterectomy: (abn PAP);         Family History:         Positive for Myocardial Infarction ( mat. GM );     Positive for Cancer- type not specified ( pat. GF; mat. GF );     Positive for Cerebrovascular Accident ( pat. GM );         Social History:         Marital Status:      Children: 3 children and 2 step-children         Tobacco/Alcohol/Supplements:     Last Reviewed on 6/09/2020 11:27 AM by Leni Doyle    Tobacco: She has never smoked.          Allergies:     Last Reviewed on 4/09/2020 11:26 AM by Leni Doyle    No Known Allergies.        Current Medications:     Last Reviewed on 4/09/2020 11:25 AM by Leni Doyle    flax seed oil 1 QD     Benadryl Allergy 25 mg oral tablet [1 at hs]    CBD oil q day     ALCLOMETASONE 0.05% CREAM 45GM  [APPLY TO AREA QD AS NEEDED]    escitalopram oxalate 20 mg oral tablet [1 TABLET BY MOUTH EVERY DAY]    busPIRone 10 mg oral tablet [take 1/2-1 tablet (10 mg) by oral route 2 times per day]    esomeprazole magnesium 40 mg oral capsule,delayed release (enteric coated) [take 1 capsule (40 mg) by oral route  daily]    colestipoL 1 gram oral tablet [take 1 tablet (1 gram) by oral route daily]        Objective:        Vitals:         Current: 6/9/2020 11:50:51 AM    Ht:  5 ft, 4.5 in;  Wt: 170 lbs;  BMI: 28.7R: 18 bpm;  sCr: 0.81 mg/dL;  GFR: 84.42        Exams:     PHYSICAL EXAM:     GENERAL: vital signs recorded - well developed, well nourished;  well groomed;  no apparent distress;     EYES: PERRL, EOMI     RESPIRATORY: normal respiratory rate and pattern with no distress;     NEUROLOGIC: mental status: oriented to person, place, and time;  GROSSLY INTACT     PSYCHIATRIC:  appropriate affect and demeanor; normal speech pattern; grossly normal  memory;         Assessment:         F32.0   Major depressive disorder, single episode, mild       M54.5   Low back pain       K21.9   Gastro-esophageal reflux disease without esophagitis           ORDERS:         Meds Prescribed:       [Refilled] busPIRone 10 mg oral tablet [take 1 tablet (10 mg) by oral route 2 times per day], #180 (one hundred and eighty) tablets, Refills: 0 (zero)       [Refilled] esomeprazole magnesium 40 mg oral capsule,delayed release (enteric coated) [take 1 capsule (40 mg) by oral route  daily], #90 (ninety) capsules, Refills: 0 (zero)       [Refilled] colestipoL 1 gram oral tablet [take 1 tablet (1 gram) by oral route bid], #180 (one hundred and eighty) tablets, Refills: 0 (zero)                 Plan:         Major depressive disorder, single episode, mildongoing, she never increased her buspar, will go ahead increase that to bid dosing    Telehealth: Verbal consent obtained for visit to occur via televideo conferencing; Staff, other than provider, present during telephone visit include Dr Bardales and patient           Prescriptions:       [Refilled] busPIRone 10 mg oral tablet [take 1 tablet (10 mg) by oral route 2 times per day], #180 (one hundred and eighty) tablets, Refills: 0 (zero)       [Refilled] esomeprazole magnesium 40 mg oral capsule,delayed release (enteric coated) [take 1 capsule (40 mg) by oral route  daily], #90 (ninety) capsules, Refills: 0 (zero)       [Refilled] colestipoL 1 gram oral tablet [take 1 tablet (1 gram) by oral route bid], #180 (one hundred and eighty) tablets, Refills: 0 (zero)         Low back painstable        Gastro-esophageal reflux disease without esophagitisoverall better, but more reflux at night, will cont nexium and increase colestipol to bid dosing            Charge Capture:         Primary Diagnosis:     F32.0  Major depressive disorder, single episode, mild           Orders:      [id number]  Office/outpatient visit; established patient, level 4   (In-House)              M54.5  Low back pain     K21.9  Gastro-esophageal reflux disease without esophagitis

## 2021-05-18 NOTE — PROGRESS NOTES
Vanesa Esquivel 1965     Office/Outpatient Visit    Visit Date: Wed, Jun 27, 2018 02:41 pm    Provider: Fina Bardales MD (Assistant: Kellee Perry MA)    Location: [facility]        Electronically signed by Fina Bardales MD on  06/27/2018 03:54:28 PM                             SUBJECTIVE:        CC: Annual physical         HPI:         PHQ-9 Depression Screening: Completed form scanned and in chart; Total Score 6 Alcohol Consumption Screening: Completed form scanned and in chart; Total Score 1         Dx with annual physical; her last physical exam was >5 years ago.  She is status-post hysterectomy.  She performs breast self-exams occasionally.    Her last Pap smear was >5 years ago, was normal, and PELVIC EXAM WNL 5/2017.   Her last mammogram was in 5/2017, 1 year ago, and was normal.   Her last DEXA was in 5/2017 and was normal.   She underwent colonoscopy in 7/2017.   She's had vision screening done <6 months ago and this was abnormal, but is corrected with glasses.   Preventative Health updated today.  Ms. Esquivel has had an abnormal Pap smear in the past.  Tobacco: She has never smoked.      chronic ongoing choking when she eats, it occurs about 5-10 X a month, she had a normal EGD 5/2017     atypical skin lesion on top of her scalp     chronic fibromyalgia with chronic back pain and acute worsening of her back and neck pain as well as increased L shoulder/thumb/hip and knee pain, no joint deformity.     ROS:     CONSTITUTIONAL:  Positive for fatigue.   Negative for chills or fever.      EYES:  Negative for blurred vision, eye pain, and photophobia.      E/N/T:  Negative for hearing problems, E/N/T pain, congestion, rhinorrhea, epistaxis, hoarseness, and dental problems.      CARDIOVASCULAR:  Negative for chest pain, palpitations, tachycardia, orthopnea, and edema.      RESPIRATORY:  Negative for cough, dyspnea, and hemoptysis.      GASTROINTESTINAL:  Negative for abdominal pain, heartburn,  constipation, diarrhea, and stool changes.      GENITOURINARY:  Negative for genital lesions, hematuria, menstrual problems, polyuria, abnormal vaginal bleeding, and vaginal discharge.      MUSCULOSKELETAL:  Positive for arthralgias, back pain and myalgias.      INTEGUMENTARY/BREAST:  Negative for atypical moles, dry skin, pruritis, rashes, breast masses, and nipple discharge.      NEUROLOGICAL:  Negative for dizziness, headaches, paresthesias, and weakness.      PSYCHIATRIC:  Negative for anxiety, depression, and sleep disturbances.          PMH/FMH/SH:     Last Reviewed on 5/22/2017 01:07 PM by Fina Bardales    Past Medical History: Fibromyalgia, GERD, chronic LBP, depression         Surgical History:         Hysterectomy: (abn PAP);         Family History:         Positive for Myocardial Infarction ( mat. GM );     Positive for Cancer- type not specified ( pat. GF; mat. GF );     Positive for Cerebrovascular Accident ( pat. GM );         Social History:         Marital Status:      Children: 3 children and 2 step-children         Tobacco/Alcohol/Supplements:     Last Reviewed on 1/26/2018 02:13 PM by Bozena Bailey    Tobacco: She has never smoked.              Allergies:     Last Reviewed on 6/27/2018 02:43 PM by Kellee Perry      No Known Drug Allergies.         Current Medications:     Last Reviewed on 6/27/2018 02:44 PM by Kellee Perry    Mucinex Maximum Strength 1,200mg Tablets, Extended Release 1 tab bid     Fish Oil     Vitamin E 400IU Tablets 1 tab daily     flax seed oil 1 QD     Benadryl Allergy 25mg Tablet 1 at hs     Citalopram Hydrobromide 40mg Tablet 1 tab daily     Pantoprazole 40mg Tablets, Delayed Release 1 po q day         OBJECTIVE:        Vitals:         Current: 6/27/2018 2:43:15 PM    Ht:  5 ft, 4.5 in;  Wt: 179 lbs;  BMI: 30.3    T: 98.3 F (oral);  BP: 112/71 mm Hg (left arm, sitting);  P: 90 bpm (left arm (BP Cuff), sitting);  sCr: 0.72 mg/dL;  GFR: 98.16         Exams:     PHYSICAL EXAM:     GENERAL: vital signs recorded - well developed, well nourished;  no apparent distress;     EYES: extraocular movements intact; conjunctiva and cornea are normal; PERRLA;     E/N/T:  normal EACs, TMs, nasal/oral mucosa, teeth, gingiva, and oropharynx;     NECK: range of motion is normal; thyroid is non-palpable;     RESPIRATORY: normal respiratory rate and pattern with no distress; normal breath sounds with no rales, rhonchi, wheezes or rubs;     CARDIOVASCULAR: normal rate; rhythm is regular;  no systolic murmur; no edema;     GASTROINTESTINAL: nontender; normal bowel sounds; no organomegaly;     BREAST/INTEGUMENT: BREASTS: breast exam is normal without masses, skin changes, or nipple discharge; SKIN: no significant rashes or lesions; no suspicious moles;     MUSCULOSKELETAL:  Normal range of motion, strength and tone; L LE is 1/2 longer than R     NEUROLOGICAL:  cranial nerves, motor and sensory function, reflexes, gait and coordination are all intact;     PSYCHIATRIC:  appropriate affect and demeanor; normal speech pattern; grossly normal memory;         ASSESSMENT           V79.0  Screening for depression              DDx:     V70.0   Z00.00  Annual physical              DDx:     787.29   R13.19  Other dysphagia              DDx:     238.2   L73.8  Atypical skin lesion              DDx:     719.49   M15.0  Diffuse arthralgia              DDx:     V76.12   Z12.31  Screening mammogram - other              DDx:     278.02   E66.3  Overweight              DDx:         ORDERS:         Radiology/Test Orders:       3014F  Screening mammography results documented and reviewed (PV)1  (In-House)         85812  Screening mammography  2-view inc CAD  (Send-Out)           Lab Orders:       [id number]  [facility] PHYSICAL: CMP, CBC, TSH, LIPID: [id number], [id number], [id number], [id number]  (Send-Out)           Other Orders:         Depression screen negative  (In-House)         1101F  Pt screen for fall  "risk; document no falls in past year or only 1 fall w/o injury in past year (GUERLINE)  (In-House)           Calculated BMI above the upper parameter and a follow-up plan was documented in the medical record  (In-House)                   PLAN:          Screening for depression     MIPS Negative Depression Screen     MAMMOGRAM: Done within last 2 years and results in are chart     BMI Elevated - Follow-Up Plan: She was provided education on weight loss strategies           Orders:         Depression screen negative  (In-House)         1101F  Pt screen for fall risk; document no falls in past year or only 1 fall w/o injury in past year (GUERLINE)  (In-House)         3014F  Screening mammography results documented and reviewed (PV)1  (In-House)           Calculated BMI above the upper parameter and a follow-up plan was documented in the medical record  (In-House)            Annual physical overall normal female PE          Other dysphagia normal EGD, will refer for a modified barium swallow study for chronic dysphagia with choking.     LABORATORY:  Labs ordered to be performed today include PHYSICAL PANEL; CMP, CBC, TSH, LIPID.            Orders:       [id number]  [facility] PHYSICAL: CMP, CBC, TSH, LIPID: [id number], [id number], [id number], [id number]  (Send-Out)            Atypical skin lesion 1 mm sebaceum-well worried, benign lesion on scalp          Diffuse arthralgia her back is \"off center\" recommend referal for spine adjustments with Dr Desouza          Screening mammogram - other           Orders:       [id number]  Screening mammography bi 2-view inc CAD  (Send-Out)            Overweight recommend diet and exercise- she defers dietician referal             CHARGE CAPTURE           **Please note: ICD descriptions below are intended for billing purposes only and may not represent clinical diagnoses**        Primary Diagnosis:         V79.0 Screening for depression               Orders:          [id number] -25  Office/outpatient visit; " established patient, level 3  (In-House)                Depression screen negative  (In-House)             1101F   Pt screen for fall risk; document no falls in past year or only 1 fall w/o injury in past year (GUERLINE)  (In-House)             3014F   Screening mammography results documented and reviewed (PV)1  (In-House)                Calculated BMI above the upper parameter and a follow-up plan was documented in the medical record  (In-House)           V70.0 Annual physical            Z00.00    Encounter for general adult medical examination without abnormal findings              Orders:          35000   Preventive medicine, established patient, age 40-64 years  (In-House)           787.29 Other dysphagia            R13.19    Other dysphagia    238.2 Atypical skin lesion            L73.8    Other specified follicular disorders    719.49 Diffuse arthralgia            M15.0    Primary generalized (osteo)arthritis    V76.12 Screening mammogram - other            Z12.31    Encounter for screening mammogram for malignant neoplasm of breast    278.02 Overweight            E66.3    Overweight

## 2021-05-18 NOTE — PROGRESS NOTES
Vanesa Esquivel  1965     Office/Outpatient Visit    Visit Date: Thu, Mar 11, 2021 02:10 pm    Provider: Fina Bardales MD (Assistant: Kellee Perry MA)    Location: [facility]        Electronically signed by Fina Bardales MD on  03/15/2021 03:00:11 PM                             Subjective:        CC: Ms. Esquivel is a 55 year old White female.  This is a follow-up visit.  pt says she isnt taking colestipol; dox video [phone]        HPI:       chronic reflux/hiatal hernia and she is struggling with worsening heartburn at times, she was on colestipol but quit this due to the size of the pill.  She avoids spicey foods.          She is being seen for her  fibromyalgia pain with chronic depression and anxiety and she is doing very well, she takes tylenol for the pain, and is on lexapro 20 mg and buspar prn, she denies crying spells, she is sleeping ok, she is happy and enjoys life and is coping well with covid pandemic.  She has no suicidal ideation.        She wants to be checked for vitamin D def, she is on 2000 units a day    ROS:     CONSTITUTIONAL:  Positive for fatigue and unintentional weight gain.   Negative for chills or fever.      E/N/T:  Negative for ear pain, nasal congestion, frequent rhinorrhea and sore throat.      CARDIOVASCULAR:  Positive for palpitations ( (fluttering) ).   Negative for chest pain.      RESPIRATORY:  Negative for recent cough and dyspnea.      GASTROINTESTINAL:  Positive for acid reflux symptoms.   Negative for constipation, diarrhea, nausea or vomiting.      MUSCULOSKELETAL:  Positive for myalgias.      INTEGUMENTARY/BREAST:  Negative for rash.      PSYCHIATRIC:  Positive for sleep disturbance.   Negative for anxiety or depression.          Past Medical History / Family History / Social History:         Last Reviewed on 7/15/2020 11:45 AM by Fina Bardales    Past Medical History: Fibromyalgia, GERD, chronic LBP, depression             PREVENTIVE  HEALTH MAINTENANCE             MAMMOGRAM: Done within last 2 years and results in are chart was last done 12/02/2020 with normal results         Surgical History:         Hysterectomy: (abn PAP);         Family History:         Positive for Myocardial Infarction ( mat. GM );     Positive for Cancer- type not specified ( pat. GF; mat. GF );     Positive for Cerebrovascular Accident ( pat. GM );         Social History:         Marital Status:      Children: 3 children and 2 step-children         Tobacco/Alcohol/Supplements:     Last Reviewed on 7/15/2020 11:22 AM by Janel Campuzano    Tobacco: She has never smoked.          Allergies:     Last Reviewed on 7/15/2020 11:22 AM by Janel Campuzano    No Known Allergies.        Current Medications:     Last Reviewed on 7/15/2020 11:18 AM by Janel Campuzano    Emergen-C  [daily]    flax seed oil 1 QD     Benadryl Allergy 25 mg oral tablet [1 at hs]    ALCLOMETASONE 0.05% CREAM 45GM  [APPLY TO AREA QD AS NEEDED]    escitalopram oxalate 20 mg oral tablet [TAKE 1 TABLET BY MOUTH EVERY DAY]    esomeprazole magnesium 40 mg oral capsule,delayed release (enteric coated) [TAKE 1 CAPSULE(40 MG) BY MOUTH DAILY]    colestipoL 1 gram oral tablet [TAKE 1 TABLET(1 GRAM) BY MOUTH TWICE DAILY]    busPIRone 10 mg oral tablet [TAKE 1 TABLET(10 MG) BY MOUTH TWICE DAILY]        Objective:        Vitals:         Current: 3/11/2021 3:04:06 PM    Ht:  5 ft, 4.5 in;  Wt: 180 lbs;  BMI: 30.4T: 98 F (oral);  R: 16 bpm;  sCr: 0.81 mg/dL;  GFR: 85.53        Exams:     PHYSICAL EXAM:     GENERAL:  well developed and nourished; appropriately groomed; in no apparent distress;     EYES: extraocular movements intact; conjunctiva and cornea are normal;     RESPIRATORY: normal respiratory rate and pattern with no distress;     MUSCULOSKELETAL: normal overall tone     NEUROLOGIC: mental status: alert and oriented x 3;     PSYCHIATRIC: appropriate affect and demeanor; normal psychomotor function;  normal speech pattern;         Assessment:         K21.9   Gastro-esophageal reflux disease without esophagitis       F41.8   Other specified anxiety disorders       E55.9   Vitamin D deficiency, unspecified       Z13.6   Encounter for screening for cardiovascular disorders           ORDERS:         Meds Prescribed:       [Refilled] colestipoL 5 gram oral Packet [take 1 packet (5 gram) by oral route qhs ;mixed completely in at least 3 oz liquid, soup, cereal or pulpy fruit], #30 (thirty) packets, Refills: 2 (two)       [Refilled] busPIRone 10 mg oral tablet [TAKE 1 TABLET(10 MG) BY MOUTH TWICE DAILY], #180 (one hundred and eighty) tablets, Refills: 1 (one)       [Refilled] escitalopram oxalate 20 mg oral tablet [TAKE 1 TABLET BY MOUTH EVERY DAY], #30 (thirty) tablets, Refills: 1 (one)       [Refilled] esomeprazole magnesium 40 mg oral capsule,delayed release (enteric coated) [TAKE 1 CAPSULE(40 MG) BY MOUTH DAILY and BID prn], #40 (forty) capsules, Refills: 5 (five)         Lab Orders:       [id number]  HTNLP - [facility] CMP AND LIPID: [id number], [id number]  (Send-Out)            92307  VITD - [facility] Vitamin D, 25 Hydroxy  (Send-Out)                      Plan:         Gastro-esophageal reflux disease without esophagitisworsening gerd, will restart her colestipol in the powder form and increase her nexium to bid prn    Telehealth: Verbal consent obtained for visit to occur via televideo conferencing; Staff, other than provider, present during telephone visit include only Dr Buckley was present during the telehealth OV with patient           Prescriptions:       [Refilled] colestipoL 5 gram oral Packet [take 1 packet (5 gram) by oral route qhs ;mixed completely in at least 3 oz liquid, soup, cereal or pulpy fruit], #30 (thirty) packets, Refills: 2 (two)       [Refilled] busPIRone 10 mg oral tablet [TAKE 1 TABLET(10 MG) BY MOUTH TWICE DAILY], #180 (one hundred and eighty) tablets, Refills: 1 (one)       [Refilled] escitalopram oxalate 20 mg  oral tablet [TAKE 1 TABLET BY MOUTH EVERY DAY], #30 (thirty) tablets, Refills: 1 (one)       [Refilled] esomeprazole magnesium 40 mg oral capsule,delayed release (enteric coated) [TAKE 1 CAPSULE(40 MG) BY MOUTH DAILY and BID prn], #40 (forty) capsules, Refills: 5 (five)         Other specified anxiety disordersstable on lexapro and buspar        Vitamin D deficiency, unspecified    LABORATORY:  Labs ordered to be performed today include Vitamin D.            Orders:       [id number]  VITD - [facility] Vitamin D, 25 Hydroxy  (Send-Out)              Encounter for screening for cardiovascular disorders    LABORATORY:  Labs ordered to be performed today include HTN/Lipid Panel: CMP, Lipid.      RECOMMENDATIONS given include: avoidance of caffeine, avoidance of cigarette smoke, keep blood pressure log as directed, healthy carb, high healthy protein and high fiber diet, avoid salt in diet, f/u every 6 months for BP checks and labs, and exercise 30 min 3-4 days a week.            Orders:       [id number]  HTNLP - [facility] CMP AND LIPID: [id number], [id number]  (Send-Out)                  Patient Recommendations:        For  Encounter for screening for cardiovascular disorders:    Try to avoid or reduce the amount of caffeine intake. Avoid cigarette smoke. Keep a daily blood pressure log and report elevated blood pressure to provider as directed. Drink plenty of fluids.  Fever increases the loss of fluids and can lead to dehydration.              Charge Capture:         Primary Diagnosis:     K21.9  Gastro-esophageal reflux disease without esophagitis           Orders:      [id number]  Office/outpatient visit; established patient, level 4  (In-House)              F41.8  Other specified anxiety disorders     E55.9  Vitamin D deficiency, unspecified     Z13.6  Encounter for screening for cardiovascular disorders

## 2021-06-16 RX ORDER — ESOMEPRAZOLE MAGNESIUM 40 MG/1
40 CAPSULE, DELAYED RELEASE ORAL
COMMUNITY
End: 2021-11-01

## 2021-06-16 RX ORDER — BUSPIRONE HYDROCHLORIDE 10 MG/1
10 TABLET ORAL 2 TIMES DAILY
COMMUNITY
End: 2021-09-15

## 2021-06-16 RX ORDER — COLESTIPOL HYDROCHLORIDE 5 G/5G
5 GRANULE, FOR SUSPENSION ORAL NIGHTLY
COMMUNITY
End: 2021-08-12

## 2021-06-16 RX ORDER — DIPHENHYDRAMINE HCL 25 MG
25 CAPSULE ORAL NIGHTLY
COMMUNITY
End: 2022-12-05

## 2021-06-16 RX ORDER — ESCITALOPRAM OXALATE 20 MG/1
20 TABLET ORAL DAILY
COMMUNITY
End: 2021-08-20

## 2021-06-16 RX ORDER — SUCRALFATE 1 G/1
1 TABLET ORAL 2 TIMES DAILY
COMMUNITY
End: 2021-06-16

## 2021-06-16 RX ORDER — ALCLOMETASONE DIPROPIONATE 0.5 MG/G
CREAM TOPICAL
COMMUNITY
End: 2022-06-02 | Stop reason: SDUPTHER

## 2021-06-16 RX ORDER — SUCRALFATE 1 G/1
TABLET ORAL
Qty: 60 TABLET | Refills: 5 | Status: SHIPPED | OUTPATIENT
Start: 2021-06-16 | End: 2021-08-12

## 2021-07-01 VITALS
WEIGHT: 178.6 LBS | HEART RATE: 82 BPM | DIASTOLIC BLOOD PRESSURE: 53 MMHG | HEIGHT: 65 IN | TEMPERATURE: 98.1 F | BODY MASS INDEX: 29.76 KG/M2 | SYSTOLIC BLOOD PRESSURE: 110 MMHG

## 2021-07-01 VITALS
BODY MASS INDEX: 28.66 KG/M2 | HEIGHT: 65 IN | WEIGHT: 172 LBS | DIASTOLIC BLOOD PRESSURE: 70 MMHG | SYSTOLIC BLOOD PRESSURE: 115 MMHG | TEMPERATURE: 97.9 F | HEART RATE: 97 BPM

## 2021-07-01 VITALS
HEART RATE: 85 BPM | TEMPERATURE: 98 F | HEIGHT: 65 IN | WEIGHT: 171 LBS | DIASTOLIC BLOOD PRESSURE: 41 MMHG | BODY MASS INDEX: 28.49 KG/M2 | SYSTOLIC BLOOD PRESSURE: 105 MMHG

## 2021-07-01 VITALS
DIASTOLIC BLOOD PRESSURE: 71 MMHG | HEIGHT: 65 IN | SYSTOLIC BLOOD PRESSURE: 112 MMHG | WEIGHT: 179 LBS | HEART RATE: 90 BPM | TEMPERATURE: 98.3 F | BODY MASS INDEX: 29.82 KG/M2

## 2021-07-02 VITALS — BODY MASS INDEX: 28.32 KG/M2 | RESPIRATION RATE: 18 BRPM | HEIGHT: 65 IN | WEIGHT: 170 LBS

## 2021-07-02 VITALS
SYSTOLIC BLOOD PRESSURE: 104 MMHG | BODY MASS INDEX: 29.26 KG/M2 | TEMPERATURE: 97.5 F | HEIGHT: 65 IN | DIASTOLIC BLOOD PRESSURE: 59 MMHG | WEIGHT: 175.6 LBS | HEART RATE: 89 BPM

## 2021-07-02 VITALS — TEMPERATURE: 98 F | RESPIRATION RATE: 16 BRPM | HEIGHT: 65 IN | WEIGHT: 180 LBS | BODY MASS INDEX: 29.99 KG/M2

## 2021-07-02 VITALS
BODY MASS INDEX: 28.49 KG/M2 | SYSTOLIC BLOOD PRESSURE: 112 MMHG | DIASTOLIC BLOOD PRESSURE: 79 MMHG | HEIGHT: 65 IN | WEIGHT: 171 LBS

## 2021-08-03 ENCOUNTER — TELEPHONE (OUTPATIENT)
Dept: FAMILY MEDICINE CLINIC | Age: 56
End: 2021-08-03

## 2021-08-03 NOTE — TELEPHONE ENCOUNTER
Hub staff attempted to follow warm transfer process and was unsuccessful     Caller: Vanesa Esquivel    Relationship to patient: Self    Best call back number: [phone]    Patient is needing: PATIENT STATED: SHE IS RETURNING CALL, REQUESTING CALL BACK TO DISCUSS EXACTLY WHAT MEDICAL RECORDS THAT ARE NEEDED ABOUT COMPROMISED WORK ABILITY

## 2021-08-03 NOTE — TELEPHONE ENCOUNTER
Caller: Dave Esquivela    Relationship: Self    Best call back number: [phone]     What form or medical record are you requesting: PATIENT REQUESTING MEDICAL RECORD FOR WORK SHOWING HER ABILITY TO WORK IS COMPROMISED     Who is requesting this form or medical record from you:  NEEDING MEDICAL RECORD FOR WORK     How would you like to receive the form or medical records (pick-up, mail, fax):   If fax, what is the fax number:   If mail, what is the address:   If pick-up, provide patient with address and location details: PATIENT WOULD LIKE TO  PAPERWORK     Timeframe paperwork needed: ASAP     Additional notes: PATIENT WOULD LIKE A CALL BACK REGARDING PAPERWORK.

## 2021-08-04 ENCOUNTER — TELEPHONE (OUTPATIENT)
Dept: FAMILY MEDICINE CLINIC | Age: 56
End: 2021-08-04

## 2021-08-04 NOTE — TELEPHONE ENCOUNTER
Pt wants to discuss long term disability due to fibromyalgia neck and back issue I advised that she will need an appt

## 2021-08-04 NOTE — TELEPHONE ENCOUNTER
First available appt made 8- I informed her that we have not received any paperwork that she will need to bring in any necessary paperwork with her

## 2021-08-04 NOTE — TELEPHONE ENCOUNTER
Caller: Vanesa Esquivel    Relationship to patient: Self    Best call back number: [phone]    Patient is needing: PATIENT CALLED IN AND REQUESTED A CALL BACK FROM DR. BHAKTA.SHE SAID IT IS REGARDING HER HEALTH HISTORY AND DISABILITY. SHE REQUESTS A CALL BACK FROM DR. BHAKTA PLEASE.

## 2021-08-12 ENCOUNTER — OFFICE VISIT (OUTPATIENT)
Dept: FAMILY MEDICINE CLINIC | Age: 56
End: 2021-08-12

## 2021-08-12 VITALS
HEIGHT: 65 IN | SYSTOLIC BLOOD PRESSURE: 125 MMHG | HEART RATE: 83 BPM | TEMPERATURE: 98.5 F | BODY MASS INDEX: 29.69 KG/M2 | WEIGHT: 178.2 LBS | DIASTOLIC BLOOD PRESSURE: 54 MMHG

## 2021-08-12 DIAGNOSIS — F32.1 CURRENT MODERATE EPISODE OF MAJOR DEPRESSIVE DISORDER WITHOUT PRIOR EPISODE (HCC): Primary | ICD-10-CM

## 2021-08-12 DIAGNOSIS — K21.00 GASTROESOPHAGEAL REFLUX DISEASE WITH ESOPHAGITIS, UNSPECIFIED WHETHER HEMORRHAGE: ICD-10-CM

## 2021-08-12 DIAGNOSIS — E55.9 VITAMIN D DEFICIENCY, UNSPECIFIED: ICD-10-CM

## 2021-08-12 DIAGNOSIS — M79.7 FIBROMYALGIA: ICD-10-CM

## 2021-08-12 DIAGNOSIS — F41.8 OTHER SPECIFIED ANXIETY DISORDERS: ICD-10-CM

## 2021-08-12 PROCEDURE — 99214 OFFICE O/P EST MOD 30 MIN: CPT | Performed by: FAMILY MEDICINE

## 2021-08-12 NOTE — PROGRESS NOTES
"Vanesa Esquivel presents to [facility].    Chief Complaint:depression    Subjective       History of Present Illness:  RAYSHAWN Alexis is in today with increased depression and she is stresssed because she is recently  from her  \"who has gone crazy\".  She is on lexapro and buspar.  She intermittently feels overwhelmed.  She is sleeping OK-she has random sleep patterns.    She has filed for disability and may need a letter for this.  She has longstanding fibromyagia with fatigue, unable to work due to all over muscular pain.        She is on nexium for GERD and it is worse and she is on nexium bid and still has chest burning and a funny feeling in her throat.  No nausea    Review of Systems:  Review of Systems   Constitutional: Positive for fatigue. Negative for chills and fever.   HENT: Negative for ear pain and sore throat.    Respiratory: Negative for cough, shortness of breath and wheezing.    Gastrointestinal: Positive for nausea. Negative for abdominal pain, constipation, diarrhea and vomiting.   Genitourinary: Negative for dysuria.   Musculoskeletal: Positive for myalgias.   Neurological: Negative for dizziness and headache.        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   • Other atopic dermatitis   • Other pruritus   • Other specified anxiety disorders   • Overweight   • Primary generalized hypertrophic osteoarthrosis   • Tinnitus, bilateral   • Vitamin D deficiency, unspecified     Past Surgical History:   • HYSTERECTOMY    ABN PAP      Family History   Problem Relation Age of Onset   • Heart attack Maternal Grandmother    • Cancer " "Maternal Grandfather    • Stroke Paternal Grandmother    • Cancer Paternal Grandfather      Social History     Tobacco Use   • Smoking status: Never Smoker   • Smokeless tobacco: Never Used   Substance Use Topics   • Alcohol use: Not on file       Health Maintenance Due   Topic Date Due   • ANNUAL PHYSICAL  Never done   • TDAP/TD VACCINES (1 - Tdap) Never done   • ZOSTER VACCINE (1 of 2) Never done   • HEPATITIS C SCREENING  Never done        Immunization History   Administered Date(s) Administered   • Influenza, Unspecified 09/17/2020       Allergies   Allergen Reactions   • Carafate [Sucralfate] Other (See Comments)     Tightness around her throat, dry mouth        Medications:  Current Outpatient Medications on File Prior to Visit   Medication Sig   • alclometasone (ACLOVATE) 0.05 % cream Apply  topically to the appropriate area as directed 2 (Two) Times a Day.   • busPIRone (BUSPAR) 10 MG tablet Take 10 mg by mouth 2 (two) times a day.   • diphenhydrAMINE (BENADRYL) 25 mg capsule Take 25 mg by mouth Every Night.   • esomeprazole (nexIUM) 40 MG capsule Take 40 mg by mouth. Daily and BID PRN   • Flaxseed, Linseed, (FLAX SEED OIL PO) Take  by mouth.   • Multiple Vitamins-Minerals (EMERGEN-C IMMUNE PO) Take  by mouth.     No current facility-administered medications on file prior to visit.       Vital Signs:   /54 (BP Location: Right arm, Patient Position: Sitting)   Pulse 83   Temp 98.5 °F (36.9 °C) (Oral)   Ht 163.8 cm (64.5\")   Wt 80.8 kg (178 lb 3.2 oz)   BMI 30.12 kg/m²       Physical Exam:  Physical Exam  Vitals and nursing note reviewed.   Constitutional:       General: She is not in acute distress.     Appearance: Normal appearance. She is not ill-appearing, toxic-appearing or diaphoretic.   HENT:      Head: Normocephalic and atraumatic.      Right Ear: Tympanic membrane, ear canal and external ear normal.      Left Ear: Tympanic membrane, ear canal and external ear normal.      Nose: No " congestion or rhinorrhea.      Mouth/Throat:      Pharynx: Oropharynx is clear. No oropharyngeal exudate or posterior oropharyngeal erythema.   Eyes:      Extraocular Movements: Extraocular movements intact.      Conjunctiva/sclera: Conjunctivae normal.      Pupils: Pupils are equal, round, and reactive to light.   Cardiovascular:      Rate and Rhythm: Normal rate and regular rhythm.      Heart sounds: Normal heart sounds.   Pulmonary:      Breath sounds: Normal breath sounds. No wheezing, rhonchi or rales.   Abdominal:      General: Abdomen is flat. Bowel sounds are normal.      Palpations: Abdomen is soft.      Tenderness: There is abdominal tenderness.   Musculoskeletal:      Cervical back: Neck supple. No rigidity.   Lymphadenopathy:      Cervical: No cervical adenopathy.   Skin:     General: Skin is warm and dry.      Capillary Refill: Capillary refill takes less than 2 seconds.   Neurological:      Mental Status: She is alert and oriented to person, place, and time.   Psychiatric:         Mood and Affect: Mood normal.         Behavior: Behavior normal.         Result Review      The following data was reviewed by Fina Bardales MD on 08/12/2021.  No results found for: WBC, HGB, HCT, MCV, PLT  Lab Results   Component Value Date    BUN 11 04/01/2021    CREATININE 0.71 04/01/2021    BCR 15 04/01/2021    K 4.3 04/01/2021    CO2 27 04/01/2021    CALCIUM 9.4 04/01/2021    ALBUMIN 4.3 04/01/2021    LABIL2 1.4 04/01/2021    AST 18 04/01/2021    ALT 21 04/01/2021     Lab Results   Component Value Date    CHLPL 169 04/01/2021    TRIG 226 (H) 04/01/2021    HDL 88 (H) 04/01/2021    LDL 36 (L) 04/01/2021     No results found for: TSH  No results found for: HGBA1C  No results found for: PSA                    Assessment and Plan:          Diagnoses and all orders for this visit:    1. Current moderate episode of major depressive disorder without prior episode (CMS/HCC) (Primary)  Comments:  Is much worse due to life  stressors.  She is not suicidal.  I recommend she get into therapy and continue current meds    2. Vitamin D deficiency, unspecified  Comments:  Stable on vitamin D supplementation    3. Other specified anxiety disorders  Comments:  Worse, she would benefit from therapy    4. Fibromyalgia  Comments:  Chronic and ongoing but she is functional with current medications    5. Gastroesophageal reflux disease with esophagitis, unspecified whether hemorrhage  Comments:  Stable on her PPI  Orders:  -     Ambulatory referral for Screening EGD          Follow Up   No follow-ups on file.

## 2021-08-20 RX ORDER — ESCITALOPRAM OXALATE 20 MG/1
TABLET ORAL
Qty: 30 TABLET | Refills: 0 | Status: SHIPPED | OUTPATIENT
Start: 2021-08-20 | End: 2021-09-15

## 2021-08-26 PROBLEM — K21.00 GASTROESOPHAGEAL REFLUX DISEASE WITH ESOPHAGITIS: Status: ACTIVE | Noted: 2021-08-26

## 2021-08-26 PROBLEM — F32.1 CURRENT MODERATE EPISODE OF MAJOR DEPRESSIVE DISORDER WITHOUT PRIOR EPISODE: Status: ACTIVE | Noted: 2021-08-26

## 2021-08-31 ENCOUNTER — PREP FOR SURGERY (OUTPATIENT)
Dept: OTHER | Facility: HOSPITAL | Age: 56
End: 2021-08-31

## 2021-08-31 ENCOUNTER — CLINICAL SUPPORT (OUTPATIENT)
Dept: GASTROENTEROLOGY | Facility: CLINIC | Age: 56
End: 2021-08-31

## 2021-08-31 ENCOUNTER — TELEPHONE (OUTPATIENT)
Dept: GASTROENTEROLOGY | Facility: CLINIC | Age: 56
End: 2021-08-31

## 2021-08-31 DIAGNOSIS — K21.9 CHRONIC GERD: Primary | ICD-10-CM

## 2021-08-31 RX ORDER — CYCLOBENZAPRINE HCL 10 MG
10 TABLET ORAL
COMMUNITY
End: 2022-06-02

## 2021-08-31 NOTE — TELEPHONE ENCOUNTER
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    • Other atopic dermatitis    • Other pruritus    • Other specified anxiety disorders    • Overweight    • Primary generalized hypertrophic osteoarthrosis    • Tinnitus, bilateral    • Vitamin D deficiency, unspecified      Allergies   Allergen Reactions   • Carafate [Sucralfate] Other (See Comments)     Tightness around her throat, dry mouth     Past Surgical History:   Procedure Laterality Date   • HYSTERECTOMY      ABN PAP     Social History     Socioeconomic History   • Marital status:      Spouse name: Not on file   • Number of children: 3   • Years of education: Not on file   • Highest education level: Not on file   Tobacco Use   • Smoking status: Never Smoker   • Smokeless tobacco: Never Used   Vaping Use   • Vaping Use: Never used     Family History   Problem Relation Age of Onset   • Heart attack Maternal Grandmother    • Cancer Maternal Grandfather    • Stroke Paternal Grandmother    • Cancer Paternal Grandfather        Current Outpatient Medications:   •  alclometasone (ACLOVATE) 0.05 % cream, Apply  topically to the appropriate area as directed 2 (Two) Times a Day., Disp: , Rfl:   •  busPIRone (BUSPAR) 10 MG tablet, Take 10 mg by mouth 2 (two) times a day., Disp: , Rfl:   •  cyclobenzaprine (FLEXERIL) 10 MG tablet, Take 10 mg by mouth., Disp: , Rfl:   •  diphenhydrAMINE (BENADRYL) 25 mg capsule, Take 25 mg by mouth Every Night., Disp: , Rfl:   •  escitalopram (LEXAPRO) 20 MG tablet, TAKE 1 TABLET BY MOUTH EVERY DAY, Disp: 30 tablet, Rfl: 0  •  esomeprazole (nexIUM) 40 MG capsule,  Take 40 mg by mouth. Daily and BID PRN, Disp: , Rfl:   •  Flaxseed, Linseed, (FLAX SEED OIL PO), Take  by mouth., Disp: , Rfl:   •  Multiple Vitamins-Minerals (EMERGEN-C IMMUNE PO), Take  by mouth., Disp: , Rfl:

## 2021-09-01 PROBLEM — K21.9 CHRONIC GERD: Status: ACTIVE | Noted: 2021-09-01

## 2021-09-15 RX ORDER — BUSPIRONE HYDROCHLORIDE 10 MG/1
TABLET ORAL
Qty: 180 TABLET | Refills: 1 | Status: SHIPPED | OUTPATIENT
Start: 2021-09-15 | End: 2022-02-22 | Stop reason: SDUPTHER

## 2021-09-15 RX ORDER — ESCITALOPRAM OXALATE 20 MG/1
TABLET ORAL
Qty: 30 TABLET | Refills: 0 | Status: SHIPPED | OUTPATIENT
Start: 2021-09-15 | End: 2021-10-13

## 2021-10-13 RX ORDER — ESCITALOPRAM OXALATE 20 MG/1
TABLET ORAL
Qty: 30 TABLET | Refills: 0 | Status: SHIPPED | OUTPATIENT
Start: 2021-10-13 | End: 2021-11-12

## 2021-11-01 RX ORDER — ESOMEPRAZOLE MAGNESIUM 40 MG/1
CAPSULE, DELAYED RELEASE ORAL
Qty: 40 CAPSULE | Refills: 0 | Status: SHIPPED | OUTPATIENT
Start: 2021-11-01 | End: 2021-12-09

## 2021-11-10 ENCOUNTER — TELEPHONE (OUTPATIENT)
Dept: GASTROENTEROLOGY | Facility: CLINIC | Age: 56
End: 2021-11-10

## 2021-11-12 ENCOUNTER — TELEPHONE (OUTPATIENT)
Dept: GASTROENTEROLOGY | Facility: CLINIC | Age: 56
End: 2021-11-12

## 2021-11-12 RX ORDER — ESCITALOPRAM OXALATE 20 MG/1
TABLET ORAL
Qty: 30 TABLET | Refills: 0 | Status: SHIPPED | OUTPATIENT
Start: 2021-11-12 | End: 2021-12-13

## 2021-11-12 NOTE — TELEPHONE ENCOUNTER
Patient called to r/s her egd from 11/17/2201 to 01/04/2022 with an arrival time of 0630. Patient is aware of date and time.     Patient also asked when she was due for her next colonoscopy. Colonoscopy and egd has been scanned in from 07/10/17. Please advise.

## 2021-11-12 NOTE — TELEPHONE ENCOUNTER
Patient has been notified. She states not previous polyps. No family  Hx of colon cancer. 10 year recall placed.

## 2021-11-12 NOTE — TELEPHONE ENCOUNTER
Per Dr. Archuleta's report, her colonoscopy was normal.  If no previous history of colon polyps and no family history of colon cancer (1st degree relative), would recommend repeat colonoscopy 10 years from her last.  Otherwise, recommend repeat in 5 years if history of polyps or family history of colon cancer.

## 2021-12-09 RX ORDER — ESOMEPRAZOLE MAGNESIUM 40 MG/1
CAPSULE, DELAYED RELEASE ORAL
Qty: 40 CAPSULE | Refills: 0 | Status: SHIPPED | OUTPATIENT
Start: 2021-12-09 | End: 2022-01-18

## 2021-12-13 RX ORDER — ESCITALOPRAM OXALATE 20 MG/1
TABLET ORAL
Qty: 30 TABLET | Refills: 0 | Status: SHIPPED | OUTPATIENT
Start: 2021-12-13 | End: 2022-01-11

## 2021-12-14 DIAGNOSIS — Z12.31 SCREENING MAMMOGRAM FOR BREAST CANCER: Primary | ICD-10-CM

## 2022-01-05 ENCOUNTER — TELEPHONE (OUTPATIENT)
Dept: GASTROENTEROLOGY | Facility: CLINIC | Age: 57
End: 2022-01-05

## 2022-01-11 RX ORDER — ESCITALOPRAM OXALATE 20 MG/1
TABLET ORAL
Qty: 30 TABLET | Refills: 0 | Status: SHIPPED | OUTPATIENT
Start: 2022-01-11 | End: 2022-02-10

## 2022-01-18 RX ORDER — ESOMEPRAZOLE MAGNESIUM 40 MG/1
CAPSULE, DELAYED RELEASE ORAL
Qty: 40 CAPSULE | Refills: 0 | Status: SHIPPED | OUTPATIENT
Start: 2022-01-18 | End: 2022-02-22 | Stop reason: SDUPTHER

## 2022-02-10 RX ORDER — ESCITALOPRAM OXALATE 20 MG/1
TABLET ORAL
Qty: 30 TABLET | Refills: 0 | Status: SHIPPED | OUTPATIENT
Start: 2022-02-10 | End: 2022-02-22 | Stop reason: SDUPTHER

## 2022-02-22 NOTE — TELEPHONE ENCOUNTER
Caller: Vanesa Esquivel    Relationship: Self    Best call back number: [phone]    Requested Prescriptions:   Requested Prescriptions     Pending Prescriptions Disp Refills   • escitalopram (LEXAPRO) 20 MG tablet 30 tablet 0     Sig: Take 1 tablet by mouth Daily.   • esomeprazole (nexIUM) 40 MG capsule 40 capsule 0   • busPIRone (BUSPAR) 10 MG tablet 180 tablet 1        Pharmacy where request should be sent: [id number] - [place], [place] - [address] AT [facility] OF [address] [facility] - [phone] [facility] [phone] FX     Additional details provided by patient: PATIENT CALLED STATING SHE IS NEEDING A REFILL TO LAST HER UNTIL HER APPT WITH HER PCP ON 06/02/2022. THE PATIENT STATED SHE IS NEEDING A 90 DAY SUPPLY DUE TO INSURANCE. THE PATIENT WOULD LIKE A CALL BACK TO LET HER KNOW THIS HAS BEEN SENT TO THE PHARMACY PLEASE ADVISE THANK YOU.    Does the patient have less than a 3 day supply:  [x] Yes  [] No    Oziel Mantilla Rep   02/22/22 14:00 EST

## 2022-02-23 ENCOUNTER — PRIOR AUTHORIZATION (OUTPATIENT)
Dept: FAMILY MEDICINE CLINIC | Age: 57
End: 2022-02-23

## 2022-02-23 RX ORDER — ESOMEPRAZOLE MAGNESIUM 40 MG/1
40 CAPSULE, DELAYED RELEASE ORAL 2 TIMES DAILY PRN
Qty: 40 CAPSULE | Refills: 0 | Status: SHIPPED | OUTPATIENT
Start: 2022-02-23 | End: 2022-02-23 | Stop reason: SDUPTHER

## 2022-02-23 RX ORDER — ESOMEPRAZOLE MAGNESIUM 40 MG/1
40 CAPSULE, DELAYED RELEASE ORAL 2 TIMES DAILY PRN
Qty: 180 CAPSULE | Refills: 0 | Status: SHIPPED | OUTPATIENT
Start: 2022-02-23 | End: 2022-06-02 | Stop reason: SDUPTHER

## 2022-02-23 RX ORDER — BUSPIRONE HYDROCHLORIDE 10 MG/1
5 TABLET ORAL 2 TIMES DAILY
Qty: 180 TABLET | Refills: 0 | Status: SHIPPED | OUTPATIENT
Start: 2022-02-23 | End: 2022-06-02 | Stop reason: SDUPTHER

## 2022-02-23 RX ORDER — ESCITALOPRAM OXALATE 20 MG/1
20 TABLET ORAL DAILY
Qty: 90 TABLET | Refills: 0 | Status: SHIPPED | OUTPATIENT
Start: 2022-02-23 | End: 2022-06-02 | Stop reason: SDUPTHER

## 2022-03-04 NOTE — TELEPHONE ENCOUNTER
I MANUALLY PUT IN PA THROUGH [facility]/AN THE RESPONSE WAS THAT PATIENT DOES NOT NEED PA FOR ESOMEPRAZOLE MAGNESIUM 40MG.  CONTACTED [facility], [phone] AND SPOKE WITH ANABEL. PATIENT ALREADY PICKED UP RX FOR THIS./[place]

## 2022-06-02 ENCOUNTER — OFFICE VISIT (OUTPATIENT)
Dept: FAMILY MEDICINE CLINIC | Age: 57
End: 2022-06-02

## 2022-06-02 VITALS
HEART RATE: 96 BPM | BODY MASS INDEX: 29.09 KG/M2 | WEIGHT: 174.6 LBS | OXYGEN SATURATION: 95 % | DIASTOLIC BLOOD PRESSURE: 77 MMHG | SYSTOLIC BLOOD PRESSURE: 122 MMHG | HEIGHT: 65 IN | TEMPERATURE: 98.5 F

## 2022-06-02 DIAGNOSIS — Z12.11 COLON CANCER SCREENING: ICD-10-CM

## 2022-06-02 DIAGNOSIS — F32.0 MAJOR DEPRESSIVE DISORDER, SINGLE EPISODE, MILD: ICD-10-CM

## 2022-06-02 DIAGNOSIS — F51.01 PRIMARY INSOMNIA: ICD-10-CM

## 2022-06-02 DIAGNOSIS — F41.8 OTHER SPECIFIED ANXIETY DISORDERS: ICD-10-CM

## 2022-06-02 DIAGNOSIS — E55.9 VITAMIN D DEFICIENCY, UNSPECIFIED: ICD-10-CM

## 2022-06-02 DIAGNOSIS — Z13.6 ENCOUNTER FOR SCREENING FOR CARDIOVASCULAR DISORDERS: ICD-10-CM

## 2022-06-02 DIAGNOSIS — Z78.0 POSTMENOPAUSAL STATE: ICD-10-CM

## 2022-06-02 DIAGNOSIS — Z11.59 ENCOUNTER FOR SCREENING FOR OTHER VIRAL DISEASES: ICD-10-CM

## 2022-06-02 DIAGNOSIS — K21.00 GASTROESOPHAGEAL REFLUX DISEASE WITH ESOPHAGITIS, UNSPECIFIED WHETHER HEMORRHAGE: ICD-10-CM

## 2022-06-02 DIAGNOSIS — L20.84 INTRINSIC ECZEMA: ICD-10-CM

## 2022-06-02 DIAGNOSIS — R41.3 MEMORY LOSS: ICD-10-CM

## 2022-06-02 DIAGNOSIS — M79.7 FIBROMYALGIA: ICD-10-CM

## 2022-06-02 DIAGNOSIS — H93.13 TINNITUS OF BOTH EARS: ICD-10-CM

## 2022-06-02 DIAGNOSIS — Z01.419 WELL WOMAN EXAM: ICD-10-CM

## 2022-06-02 DIAGNOSIS — Z12.31 ENCOUNTER FOR SCREENING MAMMOGRAM FOR BREAST CANCER: ICD-10-CM

## 2022-06-02 DIAGNOSIS — Z00.00 ANNUAL PHYSICAL EXAM: Primary | ICD-10-CM

## 2022-06-02 DIAGNOSIS — Z23 ENCOUNTER FOR IMMUNIZATION: ICD-10-CM

## 2022-06-02 DIAGNOSIS — R68.82 DECREASED LIBIDO: ICD-10-CM

## 2022-06-02 LAB
BILIRUB BLD-MCNC: NEGATIVE MG/DL
CLARITY, POC: CLEAR
COLOR UR: YELLOW
DEVELOPER EXPIRATION DATE: NORMAL
DEVELOPER LOT NUMBER: NORMAL
EXPIRATION DATE: ABNORMAL
EXPIRATION DATE: NORMAL
FECAL OCCULT BLOOD SCREEN, POC: NEGATIVE
GLUCOSE UR STRIP-MCNC: NEGATIVE MG/DL
KETONES UR QL: NEGATIVE
LEUKOCYTE EST, POC: ABNORMAL
Lab: 512
Lab: ABNORMAL
NEGATIVE CONTROL: NEGATIVE
NITRITE UR-MCNC: NEGATIVE MG/ML
PH UR: 7 [PH] (ref 5–8)
POSITIVE CONTROL: POSITIVE
PROT UR STRIP-MCNC: NEGATIVE MG/DL
RBC # UR STRIP: NEGATIVE /UL
SP GR UR: 1.01 (ref 1–1.03)
UROBILINOGEN UR QL: NORMAL

## 2022-06-02 PROCEDURE — 82270 OCCULT BLOOD FECES: CPT | Performed by: FAMILY MEDICINE

## 2022-06-02 PROCEDURE — 99214 OFFICE O/P EST MOD 30 MIN: CPT | Performed by: FAMILY MEDICINE

## 2022-06-02 PROCEDURE — 99396 PREV VISIT EST AGE 40-64: CPT | Performed by: FAMILY MEDICINE

## 2022-06-02 PROCEDURE — 90715 TDAP VACCINE 7 YRS/> IM: CPT | Performed by: FAMILY MEDICINE

## 2022-06-02 PROCEDURE — 81003 URINALYSIS AUTO W/O SCOPE: CPT | Performed by: FAMILY MEDICINE

## 2022-06-02 PROCEDURE — 90471 IMMUNIZATION ADMIN: CPT | Performed by: FAMILY MEDICINE

## 2022-06-02 RX ORDER — BUSPIRONE HYDROCHLORIDE 10 MG/1
5 TABLET ORAL 2 TIMES DAILY
Qty: 180 TABLET | Refills: 0 | Status: SHIPPED | OUTPATIENT
Start: 2022-06-02 | End: 2022-12-05 | Stop reason: SDUPTHER

## 2022-06-02 RX ORDER — ACETAMINOPHEN 500 MG
500 TABLET ORAL EVERY 6 HOURS PRN
COMMUNITY
End: 2022-12-05

## 2022-06-02 RX ORDER — ALCLOMETASONE DIPROPIONATE 0.5 MG/G
CREAM TOPICAL SEE ADMIN INSTRUCTIONS
Qty: 45 G | Refills: 0 | Status: SHIPPED | OUTPATIENT
Start: 2022-06-02 | End: 2022-12-05 | Stop reason: SDUPTHER

## 2022-06-02 RX ORDER — ESCITALOPRAM OXALATE 20 MG/1
20 TABLET ORAL DAILY
Qty: 90 TABLET | Refills: 0 | Status: SHIPPED | OUTPATIENT
Start: 2022-06-02 | End: 2022-09-22

## 2022-06-02 RX ORDER — ESOMEPRAZOLE MAGNESIUM 40 MG/1
40 CAPSULE, DELAYED RELEASE ORAL 2 TIMES DAILY PRN
Qty: 180 CAPSULE | Refills: 0 | Status: SHIPPED | OUTPATIENT
Start: 2022-06-02 | End: 2022-12-05 | Stop reason: SDUPTHER

## 2022-06-02 RX ORDER — TRAZODONE HYDROCHLORIDE 50 MG/1
50 TABLET ORAL NIGHTLY PRN
Qty: 30 TABLET | Refills: 5 | Status: SHIPPED | OUTPATIENT
Start: 2022-06-02 | End: 2022-12-05 | Stop reason: SDUPTHER

## 2022-06-02 NOTE — PROGRESS NOTES
Vanesa Esquivel presents to [facility].    Chief Complaint: yearly physical    Subjective       History of Present Illness:  HPI     Yearly physical and WWE.  She has decreased sexual interest/libido and she states she feels like this impacts her marriage.  Her  is also and alcoholic and is currently sober.  She is s/p hysterectomy (doen with PAP, last pelvic exam unknown)  She needs her tdap and shingrix vaccinations, yearly flu vaccination and covid boosters prn. Last mammogram 12/2/2020-normal.  DEXA-unknown. Colonoscopy normal 7/10/17.  No tobacco use. No ETOH use.  Overdue for EGD ordered for her GERD and esophageal symptoms.  She wears her seatbelt in the car.    She is concerned about her tinnitis and memory loss, and she has a sister dx with brain tumor and she is worried to death she has a brain tumor    Vanesa is in today with depression which is better than it has been for a while on lexapro and buspar.  She is sleeping poorly.  Denies suicidal ideation.   She has longstanding fibromyagia with fatigue, unable to work due to all over muscular pain.         She is on nexium for GERD and it is worse and she needs an EGC and is hesitant due to covid      Review of Systems:  Review of Systems   Constitutional: Positive for fatigue. Negative for chills and fever.   HENT: Negative for ear pain, sinus pressure and sore throat.    Respiratory: Negative for cough, shortness of breath and wheezing.    Cardiovascular: Negative for chest pain and palpitations.   Gastrointestinal: Negative for abdominal pain, blood in stool, constipation, diarrhea, nausea and vomiting.   Skin: Negative for rash.   Neurological: Negative for dizziness and headache.   Psychiatric/Behavioral: Positive for sleep disturbance and depressed mood. Negative for suicidal ideas. The patient is nervous/anxious.         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   • Other atopic dermatitis   • Other pruritus   • Other specified anxiety disorders   • Overweight   • Primary generalized hypertrophic osteoarthrosis   • Tinnitus, bilateral   • Vitamin D deficiency, unspecified     Past Surgical History:   • HYSTERECTOMY    ABN PAP      Family History   Problem Relation Age of Onset   • Heart attack Maternal Grandmother    • Cancer Maternal Grandfather    • Stroke Paternal Grandmother    • Cancer Paternal Grandfather      Social History     Tobacco Use   • Smoking status: Never Smoker   • Smokeless tobacco: Never Used   Substance Use Topics   • Alcohol use: Not Currently       Health Maintenance Due   Topic Date Due   • ZOSTER VACCINE (1 of 2) Never done   • HEPATITIS C SCREENING  Never done        Immunization History   Administered Date(s) Administered   • COVID-19 (MODERNA) 1st, 2nd, 3rd Dose Only 02/12/2021, 03/12/2021   • COVID-19 (MODERNA) BOOSTER 12/10/2021, 04/12/2022   • Influenza, Unspecified 09/17/2020   • Tdap 06/02/2022       Allergies   Allergen Reactions   • Carafate [Sucralfate] Other (See Comments)     Tightness around her throat, dry mouth        Medications:  Current Outpatient Medications on File Prior to Visit   Medication Sig   • acetaminophen (TYLENOL) 500 MG tablet Take 500 mg by mouth Every 6 (Six) Hours As Needed for Mild Pain .   • diphenhydrAMINE (BENADRYL) 25 mg capsule Take 25 mg by mouth Every Night.   • Flaxseed, Linseed, (FLAX SEED OIL PO) Take  by mouth.   • Multiple Vitamins-Minerals (EMERGEN-C IMMUNE PO) Take  by mouth.   • [DISCONTINUED] alclometasone (ACLOVATE) 0.05 % cream Apply  topically to the appropriate area as directed 2 (Two) Times a Day.   • [DISCONTINUED]  "busPIRone (BUSPAR) 10 MG tablet Take 0.5 tablets by mouth 2 (Two) Times a Day.   • [DISCONTINUED] cyclobenzaprine (FLEXERIL) 10 MG tablet Take 10 mg by mouth.   • [DISCONTINUED] escitalopram (LEXAPRO) 20 MG tablet Take 1 tablet by mouth Daily.   • [DISCONTINUED] esomeprazole (nexIUM) 40 MG capsule Take 1 capsule by mouth 2 (Two) Times a Day As Needed (GERD).     No current facility-administered medications on file prior to visit.       Vital Signs:   /77 (BP Location: Right arm, Patient Position: Sitting, Cuff Size: Adult)   Pulse 96   Temp 98.5 °F (36.9 °C) (Oral)   Ht 163.8 cm (64.5\")   Wt 79.2 kg (174 lb 9.6 oz)   SpO2 95% Comment: Room air  BMI 29.51 kg/m²       Physical Exam:  Physical Exam  Exam conducted with a chaperone present.   Constitutional:       General: She is not in acute distress.     Appearance: She is normal weight. She is not ill-appearing.   HENT:      Head: Normocephalic.      Right Ear: Tympanic membrane normal. There is no impacted cerumen.      Left Ear: Tympanic membrane normal. There is no impacted cerumen.      Mouth/Throat:      Mouth: Mucous membranes are moist.      Pharynx: Oropharynx is clear.   Eyes:      Extraocular Movements: Extraocular movements intact.      Pupils: Pupils are equal, round, and reactive to light.   Neck:      Vascular: No carotid bruit.   Cardiovascular:      Rate and Rhythm: Normal rate and regular rhythm.      Pulses: Normal pulses.      Heart sounds: No murmur heard.  Pulmonary:      Effort: Pulmonary effort is normal.      Breath sounds: No wheezing, rhonchi or rales.   Chest:   Breasts:      Manpreet Score is 5.      Right: Normal. No axillary adenopathy.      Left: Normal. No axillary adenopathy.       Abdominal:      General: Bowel sounds are normal.      Palpations: Abdomen is soft.      Tenderness: There is no abdominal tenderness.   Genitourinary:     General: Normal vulva.      Vagina: Normal.      Cervix: Normal.      Adnexa: Right adnexa " normal and left adnexa normal.      Rectum: Normal. Guaiac result negative. No mass, tenderness, anal fissure, external hemorrhoid or internal hemorrhoid. Normal anal tone.   Musculoskeletal:         General: No swelling. Normal range of motion.      Cervical back: No rigidity or tenderness.   Lymphadenopathy:      Cervical: No cervical adenopathy.      Upper Body:      Right upper body: No axillary adenopathy.      Left upper body: No axillary adenopathy.   Skin:     General: Skin is warm and dry.   Neurological:      Mental Status: She is alert.      Gait: Gait normal.   Psychiatric:         Mood and Affect: Mood normal.         Behavior: Behavior normal.         Judgment: Judgment normal.         Result Review      The following data was reviewed by Fina Bardales MD on 06/02/2022.  No results found for: WBC, HGB, HCT, MCV, PLT  Lab Results   Component Value Date    GLUCOSE 90 04/01/2021    BUN 11 04/01/2021    CREATININE 0.71 04/01/2021    BCR 15 04/01/2021    K 4.3 04/01/2021    CO2 27 04/01/2021    CALCIUM 9.4 04/01/2021    ALBUMIN 4.3 04/01/2021    LABIL2 1.4 04/01/2021    AST 18 04/01/2021    ALT 21 04/01/2021     Lab Results   Component Value Date    CHLPL 169 04/01/2021    TRIG 226 (H) 04/01/2021    HDL 88 (H) 04/01/2021    LDL 36 (L) 04/01/2021     No results found for: TSH  No results found for: HGBA1C  No results found for: PSA                    Assessment and Plan:          Diagnoses and all orders for this visit:    1. Annual physical exam (Primary)  Comments:  No Pap performed, due for mammogram and bone density.  Tdap given.  She also needs the Shingrix but we are out today    2. Well woman exam  -     POCT urinalysis dipstick, automated    3. Encounter for screening mammogram for breast cancer  -     Mammo Screening Digital Tomosynthesis Bilateral With CAD; Future    4. Encounter for immunization  -     Tdap Vaccine Greater Than or Equal To 6yo IM    5. Colon cancer screening  -     POC  Occult Blood Stool    6. Postmenopausal state  -     DEXA Bone Density Axial; Future    7. Vitamin D deficiency, unspecified  -     Vitamin D 25 hydroxy; Future    8. Fibromyalgia  -     escitalopram (LEXAPRO) 20 MG tablet; Take 1 tablet by mouth Daily.  Dispense: 90 tablet; Refill: 0  -     busPIRone (BUSPAR) 10 MG tablet; Take 0.5 tablets by mouth 2 (Two) Times a Day.  Dispense: 180 tablet; Refill: 0    9. Other specified anxiety disorders    10. Major depressive disorder, single episode, mild (HCC)  -     escitalopram (LEXAPRO) 20 MG tablet; Take 1 tablet by mouth Daily.  Dispense: 90 tablet; Refill: 0  -     busPIRone (BUSPAR) 10 MG tablet; Take 0.5 tablets by mouth 2 (Two) Times a Day.  Dispense: 180 tablet; Refill: 0    11. Gastroesophageal reflux disease with esophagitis, unspecified whether hemorrhage  -     Ambulatory referral for Screening EGD  -     esomeprazole (nexIUM) 40 MG capsule; Take 1 capsule by mouth 2 (Two) Times a Day As Needed (GERD).  Dispense: 180 capsule; Refill: 0    12. Decreased libido    13. Encounter for screening for other viral diseases  -     Hepatitis C antibody; Future    14. Memory loss  -     Ambulatory Referral to Neurology    15. Encounter for screening for cardiovascular disorders  -     Comprehensive Metabolic Panel; Future  -     CBC (No Diff); Future  -     Lipid Panel; Future    16. Tinnitus of both ears    17. Intrinsic eczema  -     alclometasone (ACLOVATE) 0.05 % cream; Apply  topically to the appropriate area as directed See Admin Instructions. apply topically to the appropriate area as directed 2 times a day  Dispense: 45 g; Refill: 0    18. Primary insomnia  -     traZODone (DESYREL) 50 MG tablet; Take 1 tablet by mouth At Night As Needed for Sleep.  Dispense: 30 tablet; Refill: 5          Follow Up   Return in about 3 months (around 9/2/2022) for Recheck.

## 2022-06-10 ENCOUNTER — LAB (OUTPATIENT)
Dept: LAB | Facility: HOSPITAL | Age: 57
End: 2022-06-10

## 2022-06-10 DIAGNOSIS — E55.9 VITAMIN D DEFICIENCY, UNSPECIFIED: ICD-10-CM

## 2022-06-10 DIAGNOSIS — Z11.59 ENCOUNTER FOR SCREENING FOR OTHER VIRAL DISEASES: ICD-10-CM

## 2022-06-10 DIAGNOSIS — Z13.6 ENCOUNTER FOR SCREENING FOR CARDIOVASCULAR DISORDERS: ICD-10-CM

## 2022-06-10 LAB
25(OH)D3 SERPL-MCNC: 54 NG/ML (ref 30–100)
ALBUMIN SERPL-MCNC: 4.2 G/DL (ref 3.5–5.2)
ALBUMIN/GLOB SERPL: 1.8 G/DL
ALP SERPL-CCNC: 112 U/L (ref 39–117)
ALT SERPL W P-5'-P-CCNC: 15 U/L (ref 1–33)
ANION GAP SERPL CALCULATED.3IONS-SCNC: 9.1 MMOL/L (ref 5–15)
AST SERPL-CCNC: 17 U/L (ref 1–32)
BILIRUB SERPL-MCNC: 0.4 MG/DL (ref 0–1.2)
BUN SERPL-MCNC: 9 MG/DL (ref 6–20)
BUN/CREAT SERPL: 12.2 (ref 7–25)
CALCIUM SPEC-SCNC: 9.4 MG/DL (ref 8.6–10.5)
CHLORIDE SERPL-SCNC: 104 MMOL/L (ref 98–107)
CHOLEST SERPL-MCNC: 139 MG/DL (ref 0–200)
CO2 SERPL-SCNC: 24.9 MMOL/L (ref 22–29)
CREAT SERPL-MCNC: 0.74 MG/DL (ref 0.57–1)
DEPRECATED RDW RBC AUTO: 40 FL (ref 37–54)
EGFRCR SERPLBLD CKD-EPI 2021: 95.7 ML/MIN/1.73
ERYTHROCYTE [DISTWIDTH] IN BLOOD BY AUTOMATED COUNT: 12.8 % (ref 12.3–15.4)
GLOBULIN UR ELPH-MCNC: 2.4 GM/DL
GLUCOSE SERPL-MCNC: 97 MG/DL (ref 65–99)
HCT VFR BLD AUTO: 37.4 % (ref 34–46.6)
HCV AB SER DONR QL: NORMAL
HDLC SERPL-MCNC: 83 MG/DL (ref 40–60)
HGB BLD-MCNC: 12.6 G/DL (ref 12–15.9)
LDLC SERPL CALC-MCNC: 40 MG/DL (ref 0–100)
LDLC/HDLC SERPL: 0.47 {RATIO}
MCH RBC QN AUTO: 28.5 PG (ref 26.6–33)
MCHC RBC AUTO-ENTMCNC: 33.7 G/DL (ref 31.5–35.7)
MCV RBC AUTO: 84.6 FL (ref 79–97)
PLATELET # BLD AUTO: 275 10*3/MM3 (ref 140–450)
PMV BLD AUTO: 9 FL (ref 6–12)
POTASSIUM SERPL-SCNC: 4.5 MMOL/L (ref 3.5–5.2)
PROT SERPL-MCNC: 6.6 G/DL (ref 6–8.5)
RBC # BLD AUTO: 4.42 10*6/MM3 (ref 3.77–5.28)
SODIUM SERPL-SCNC: 138 MMOL/L (ref 136–145)
TRIGL SERPL-MCNC: 85 MG/DL (ref 0–150)
VLDLC SERPL-MCNC: 16 MG/DL (ref 5–40)
WBC NRBC COR # BLD: 5.71 10*3/MM3 (ref 3.4–10.8)

## 2022-06-10 PROCEDURE — 36415 COLL VENOUS BLD VENIPUNCTURE: CPT

## 2022-06-10 PROCEDURE — 86803 HEPATITIS C AB TEST: CPT

## 2022-06-10 PROCEDURE — 85027 COMPLETE CBC AUTOMATED: CPT

## 2022-06-10 PROCEDURE — 80061 LIPID PANEL: CPT

## 2022-06-10 PROCEDURE — 82306 VITAMIN D 25 HYDROXY: CPT

## 2022-06-10 PROCEDURE — 80053 COMPREHEN METABOLIC PANEL: CPT

## 2022-07-06 ENCOUNTER — HOSPITAL ENCOUNTER (OUTPATIENT)
Dept: MAMMOGRAPHY | Facility: HOSPITAL | Age: 57
Discharge: HOME | End: 2022-07-06

## 2022-07-06 ENCOUNTER — HOSPITAL ENCOUNTER (OUTPATIENT)
Dept: BONE DENSITY | Facility: HOSPITAL | Age: 57
Discharge: HOME | End: 2022-07-06

## 2022-07-06 DIAGNOSIS — Z78.0 POSTMENOPAUSAL STATE: ICD-10-CM

## 2022-07-06 DIAGNOSIS — Z12.31 ENCOUNTER FOR SCREENING MAMMOGRAM FOR BREAST CANCER: ICD-10-CM

## 2022-07-06 PROCEDURE — 77067 SCR MAMMO BI INCL CAD: CPT

## 2022-07-06 PROCEDURE — 77063 BREAST TOMOSYNTHESIS BI: CPT

## 2022-07-06 PROCEDURE — 77080 DXA BONE DENSITY AXIAL: CPT

## 2022-07-21 ENCOUNTER — TELEPHONE (OUTPATIENT)
Dept: FAMILY MEDICINE CLINIC | Age: 57
End: 2022-07-21

## 2022-07-21 NOTE — TELEPHONE ENCOUNTER
Caller: Vanesa Esquivel    Relationship: Self    Best call back number: [phone]    What test was performed: BONE DENSITY    When was the test performed: 07/06/22    Additional notes: THE PATIENT WOULD LIKE A CALL BACK WITH HER TEST RESULTS ASAP

## 2022-07-27 ENCOUNTER — OFFICE VISIT (OUTPATIENT)
Dept: GASTROENTEROLOGY | Facility: CLINIC | Age: 57
End: 2022-07-27

## 2022-07-27 VITALS
OXYGEN SATURATION: 99 % | SYSTOLIC BLOOD PRESSURE: 123 MMHG | HEART RATE: 77 BPM | HEIGHT: 65 IN | BODY MASS INDEX: 29.51 KG/M2 | DIASTOLIC BLOOD PRESSURE: 50 MMHG

## 2022-07-27 DIAGNOSIS — K62.5 RECTAL BLEEDING: ICD-10-CM

## 2022-07-27 DIAGNOSIS — R15.2 FECAL URGENCY: ICD-10-CM

## 2022-07-27 DIAGNOSIS — Z86.010 HISTORY OF COLON POLYPS: ICD-10-CM

## 2022-07-27 DIAGNOSIS — R12 HEARTBURN: ICD-10-CM

## 2022-07-27 DIAGNOSIS — R13.13 PHARYNGEAL DYSPHAGIA: ICD-10-CM

## 2022-07-27 DIAGNOSIS — R11.0 NAUSEA: Primary | ICD-10-CM

## 2022-07-27 PROBLEM — F41.8 ANXIETY WITH DEPRESSION: Status: ACTIVE | Noted: 2022-07-27

## 2022-07-27 PROBLEM — Z86.0100 HISTORY OF COLON POLYPS: Status: ACTIVE | Noted: 2022-07-27

## 2022-07-27 PROBLEM — F32.1 MODERATE MAJOR DEPRESSION: Status: ACTIVE | Noted: 2022-07-27

## 2022-07-27 PROBLEM — R13.10 DYSPHAGIA: Status: ACTIVE | Noted: 2022-07-27

## 2022-07-27 PROBLEM — M79.7 FIBROMYALGIA: Status: ACTIVE | Noted: 2022-07-27

## 2022-07-27 PROBLEM — M54.9 BACK PAIN: Status: ACTIVE | Noted: 2022-07-27

## 2022-07-27 PROBLEM — D07.1 SEVERE VULVAR DYSPLASIA, HISTOLOGICALLY CONFIRMED: Status: ACTIVE | Noted: 2017-10-18

## 2022-07-27 PROBLEM — H93.19 TINNITUS: Status: ACTIVE | Noted: 2022-07-27

## 2022-07-27 PROBLEM — K21.9 GASTROESOPHAGEAL REFLUX DISEASE: Status: ACTIVE | Noted: 2022-07-27

## 2022-07-27 PROCEDURE — 99204 OFFICE O/P NEW MOD 45 MIN: CPT | Performed by: NURSE PRACTITIONER

## 2022-07-27 RX ORDER — FAMOTIDINE 40 MG/1
40 TABLET, FILM COATED ORAL DAILY PRN
Qty: 90 TABLET | Refills: 1 | Status: SHIPPED | OUTPATIENT
Start: 2022-07-27 | End: 2022-12-05 | Stop reason: SDUPTHER

## 2022-07-27 RX ORDER — ERGOCALCIFEROL (VITAMIN D2) 10 MCG
400 TABLET ORAL DAILY
COMMUNITY
End: 2022-07-27

## 2022-07-27 RX ORDER — SOD SULF/POT CHLORIDE/MAG SULF 1.479 G
12 TABLET ORAL TAKE AS DIRECTED
Qty: 24 TABLET | Refills: 0 | Status: SHIPPED | OUTPATIENT
Start: 2022-07-27 | End: 2023-04-03

## 2022-07-27 RX ORDER — MULTIVIT WITH MINERALS/LUTEIN
250 TABLET ORAL DAILY
COMMUNITY
End: 2022-07-27

## 2022-07-27 NOTE — PROGRESS NOTES
"Patient Name: Vanesa Esquivel   Visit Date: 07/27/2022   Patient ID: [id number]  Provider: XIAO Workman    Sex: female  Location:  Location Address:  Location Phone: [address] [facility] [address] [address] [phone]    YOB: 1965      Primary Care Provider Fina Bardales MD      Referring Provider: Fina Bardales MD        Chief Complaint  Heartburn (Esophagitis/Pt states heartburn management is not going well- flaired up past week/Canceled EGD 1/4/22 due to going out of town, afraid to contract COVID 19/pt states 2017 egd was performed and was normal- not in chart for an encounter?/), Hemorrhoids (Ongoing issue ), Abdominal Pain (Located on in center near bottom of sternum ), and Difficulty Swallowing (Feels swollen a lot of time - pt states been struggling with this for years, very sensitive, claims to choke on food all the time)    History of Present Illness  Vanesa Esquivel is a 57 y.o. who presents to [facility] on referral from Fina Bardales MD for a gastroenterology evaluation of Heartburn (Esophagitis/Pt states heartburn management is not going well- flaired up past week/Canceled EGD 1/4/22 due to going out of town, afraid to contract COVID 19/pt states 2017 egd was performed and was normal- not in chart for an encounter?/), Hemorrhoids (Ongoing issue ), Abdominal Pain (Located on in center near bottom of sternum ), and Difficulty Swallowing (Feels swollen a lot of time - pt states been struggling with this for years, very sensitive, claims to choke on food all the time).    Ms. Esquivel presents today with complaints of pharyngeal dysphagia for several years now on and off. Has had increased episodes in the last few weeks. Avoids all big tablets or big bites as it tends to get stuck. Difficulty swallowing is with solids and liquids. Admits to heartburn as well that is \"kind of\" controlled with nexium 40mg. Nauseated often " "without vomiting. 2 cups of coffee daily that is half caffeinated.  Appetite and weight stable.    Bowel movement several times a day.  Stool is always loose and fecal urgency is present.  Previously prescribed Colstid and felt that \"everything dried up\" and she was unable to produce saliva. Did not notice any change with stools either. Coffee helps keep her regular.  Notes a history of bright red rectal bleeding with bowel movements however unable to recall last episode.  Reports she also has a history of colon polyps.      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 apply topically to the appropriate area as directed 2 times a day, Disp: 45 g, Rfl: 0  •  busPIRone (BUSPAR) 10 MG tablet, Take 0.5 tablets by mouth 2 (Two) Times a Day., Disp: 180 tablet, Rfl: 0  •  diphenhydrAMINE (BENADRYL) 25 mg capsule, Take 25 mg by mouth Every Night., Disp: , Rfl:   •  escitalopram (LEXAPRO) 20 MG tablet, Take 1 tablet by mouth Daily., Disp: 90 tablet, Rfl: 0  •  esomeprazole (nexIUM) 40 MG " "capsule, Take 1 capsule by mouth 2 (Two) Times a Day As Needed (GERD)., Disp: 180 capsule, Rfl: 0  •  Flaxseed, Linseed, (FLAX SEED OIL PO), Take  by mouth., Disp: , Rfl:   •  traZODone (DESYREL) 50 MG tablet, Take 1 tablet by mouth At Night As Needed for Sleep., Disp: 30 tablet, Rfl: 5  •  famotidine (PEPCID) 40 MG tablet, Take 1 tablet by mouth Daily As Needed for Indigestion or Heartburn., Disp: 90 tablet, Rfl: 1  •  Multiple Vitamins-Minerals (EMERGEN-C IMMUNE PO), Take  by mouth., Disp: , Rfl:   •  Sodium Sulfate-Mag Sulfate-KCl (Sutab) 8590-883-014 MG tablet, Take 12 tablets by mouth Take As Directed. Take 12 tablets at 6 pm and 12 tablets 4 hours prior to procedure., Disp: 24 tablet, Rfl: 0     Allergies   Allergen Reactions   • Carafate [Sucralfate] Other (See Comments)     Tightness around her throat, dry mouth       Family History   Problem Relation Age of Onset   • Heart attack Maternal Grandmother    • Cancer Maternal Grandfather    • Stroke Paternal Grandmother    • Cancer Paternal Grandfather         Social History     Social History Narrative   • Not on file         Objective     Review of Systems   Constitutional: Negative for appetite change and unexpected weight loss.   Gastrointestinal: Positive for anal bleeding, diarrhea, nausea and GERD.        Vital Signs:   /50 (BP Location: Right arm, Patient Position: Sitting, Cuff Size: Adult)   Pulse 77   Ht 163.8 cm (64.5\")   SpO2 99%   BMI 29.51 kg/m²       Physical Exam  Constitutional:       General: She is not in acute distress.     Appearance: Normal appearance. She is well-developed and normal weight.   HENT:      Head: Normocephalic and atraumatic.   Eyes:      Conjunctiva/sclera: Conjunctivae normal.      Pupils: Pupils are equal, round, and reactive to light.      Visual Fields: Right eye visual fields normal and left eye visual fields normal.   Cardiovascular:      Rate and Rhythm: Normal rate and regular rhythm.      Heart sounds: " Normal heart sounds.   Pulmonary:      Effort: Pulmonary effort is normal. No retractions.      Breath sounds: Normal breath sounds and air entry.   Abdominal:      General: Bowel sounds are normal. There is no distension.      Palpations: Abdomen is soft.      Tenderness: There is no abdominal tenderness.      Comments: No appreciable hepatosplenomegaly or ascites   Musculoskeletal:         General: Normal range of motion.      Cervical back: Normal range of motion and neck supple.   Skin:     General: Skin is warm and dry.   Neurological:      Mental Status: She is alert and oriented to person, place, and time.   Psychiatric:         Mood and Affect: Mood and affect normal.         Behavior: Behavior normal.         Result Review :   The following data was reviewed by: XIAO Workman on 07/27/2022:  CBC w/diff    CBC w/Diff 6/10/22   WBC 5.71   RBC 4.42   Hemoglobin 12.6   Hematocrit 37.4   MCV 84.6   MCH 28.5   MCHC 33.7   RDW 12.8   Platelets 275           CMP    CMP 6/10/22   Glucose 97   BUN 9   Creatinine 0.74   Sodium 138   Potassium 4.5   Chloride 104   Calcium 9.4   Albumin 4.20   Total Bilirubin 0.4   Alkaline Phosphatase 112   AST (SGOT) 17   ALT (SGPT) 15           Hepatitis C Ab   Date Value Ref Range Status   06/10/2022 Non-Reactive Non-Reactive Final             Assessment and Plan    Diagnoses and all orders for this visit:    1. Nausea (Primary)  -     Case Request; Standing  -     Case Request  -     US Liver; Future  -     NM HIDA Scan With Pharmacological Intervention; Future    2. Heartburn  -     Case Request; Standing  -     Case Request    3. Pharyngeal dysphagia  -     Case Request; Standing  -     Case Request    4. Fecal urgency  -     Case Request; Standing  -     Case Request  -     Calprotectin, Fecal - Stool, Per Rectum; Future  -     Enteric Bacterial Panel - Stool, Per Rectum; Future  -     Enteric Parasite Panel - Stool, Per Rectum; Future  -     Clostridium Difficile  Toxin - Stool, Per Rectum; Future  -     Pancreatic Elastase, Fecal - Stool, Per Rectum; Future  -     US Liver; Future  -     NM HIDA Scan With Pharmacological Intervention; Future    5. History of colon polyps  -     Case Request; Standing  -     Case Request    6. Rectal bleeding  -     Case Request; Standing  -     Case Request    Other orders  -     Follow Anesthesia Guidelines / Protocol; Future  -     Obtain Informed Consent; Future  -     Sodium Sulfate-Mag Sulfate-KCl (Sutab) 1090-513-555 MG tablet; Take 12 tablets by mouth Take As Directed. Take 12 tablets at 6 pm and 12 tablets 4 hours prior to procedure.  Dispense: 24 tablet; Refill: 0  -     famotidine (PEPCID) 40 MG tablet; Take 1 tablet by mouth Daily As Needed for Indigestion or Heartburn.  Dispense: 90 tablet; Refill: 1      ESOPHAGOGASTRODUODENOSCOPY (N/A), COLONOSCOPY (N/A)       Follow Up   Return for Follow up after procedure.  Patient was given instructions and counseling regarding her condition or for health maintenance advice. Please see specific information pulled into the AVS if appropriate.

## 2022-08-02 ENCOUNTER — TELEPHONE (OUTPATIENT)
Dept: GASTROENTEROLOGY | Facility: CLINIC | Age: 57
End: 2022-08-02

## 2022-08-02 DIAGNOSIS — R11.0 NAUSEA: Primary | ICD-10-CM

## 2022-08-02 NOTE — TELEPHONE ENCOUNTER
Patient called and states she was scheduled for an ultrasound of the liver but she was under the impression it should be for her gallbladder. Please advise. Patient aware order has been corrected.

## 2022-08-04 ENCOUNTER — LAB (OUTPATIENT)
Dept: LAB | Facility: HOSPITAL | Age: 57
End: 2022-08-04

## 2022-08-04 DIAGNOSIS — R15.2 FECAL URGENCY: ICD-10-CM

## 2022-08-04 LAB
027 TOXIN: NORMAL
C DIFF TOX GENS STL QL NAA+PROBE: NEGATIVE

## 2022-08-04 PROCEDURE — 87493 C DIFF AMPLIFIED PROBE: CPT

## 2022-08-04 PROCEDURE — 82653 EL-1 FECAL QUANTITATIVE: CPT

## 2022-08-04 PROCEDURE — 87506 IADNA-DNA/RNA PROBE TQ 6-11: CPT

## 2022-08-04 PROCEDURE — 83993 ASSAY FOR CALPROTECTIN FECAL: CPT

## 2022-08-05 LAB
C COLI+JEJ+UPSA DNA STL QL NAA+NON-PROBE: NOT DETECTED
CRYPTOSP DNA STL QL NAA+NON-PROBE: NOT DETECTED
E HISTOLYT DNA STL QL NAA+NON-PROBE: NOT DETECTED
EC STX1+STX2 GENES STL QL NAA+NON-PROBE: NOT DETECTED
G LAMBLIA DNA STL QL NAA+NON-PROBE: NOT DETECTED
S ENT+BONG DNA STL QL NAA+NON-PROBE: NOT DETECTED
SHIGELLA SP+EIEC IPAH ST NAA+NON-PROBE: NOT DETECTED

## 2022-08-09 LAB
CALPROTECTIN STL-MCNT: 54 UG/G (ref 0–120)
ELASTASE PANC STL-MCNT: 231 UG ELAST./G

## 2022-08-29 ENCOUNTER — APPOINTMENT (OUTPATIENT)
Dept: ULTRASOUND IMAGING | Facility: HOSPITAL | Age: 57
End: 2022-08-29

## 2022-08-29 ENCOUNTER — APPOINTMENT (OUTPATIENT)
Dept: NUCLEAR MEDICINE | Facility: HOSPITAL | Age: 57
End: 2022-08-29

## 2022-09-20 ENCOUNTER — TELEPHONE (OUTPATIENT)
Dept: GASTROENTEROLOGY | Facility: CLINIC | Age: 57
End: 2022-09-20

## 2022-09-21 ENCOUNTER — OFFICE VISIT (OUTPATIENT)
Dept: NEUROLOGY | Facility: CLINIC | Age: 57
End: 2022-09-21

## 2022-09-21 VITALS
DIASTOLIC BLOOD PRESSURE: 56 MMHG | HEART RATE: 90 BPM | BODY MASS INDEX: 29.34 KG/M2 | SYSTOLIC BLOOD PRESSURE: 113 MMHG | WEIGHT: 176.1 LBS | HEIGHT: 65 IN

## 2022-09-21 DIAGNOSIS — H93.13 TINNITUS OF BOTH EARS: ICD-10-CM

## 2022-09-21 DIAGNOSIS — G47.8 UNREFRESHED BY SLEEP: ICD-10-CM

## 2022-09-21 DIAGNOSIS — R41.3 MEMORY LOSS: Primary | ICD-10-CM

## 2022-09-21 DIAGNOSIS — R06.83 SNORES: ICD-10-CM

## 2022-09-21 PROCEDURE — 99215 OFFICE O/P EST HI 40 MIN: CPT | Performed by: NURSE PRACTITIONER

## 2022-09-21 RX ORDER — MELATONIN
1000 DAILY
COMMUNITY

## 2022-09-21 NOTE — PROGRESS NOTES
"Chief Complaint  Neurologic Problem    Subjective          Vanesa Esquivel presents to [facility]  History of Present Illness  States she's experiencing memory difficulty for 10+ years. Feels that about that time she may have had a \"nervous breakdown\", but was never diagnosed.    Endorses intermittent dizziness.  Endorses tinnitus.  States she had a normal hearing test. States she \"spaces out\".  With the dizziness can get a wave of nausea.  Endorses fatigue. Was diagnosed with fibromyalgia several years ago.  States she forgets why she went into a room. Will struggle to remember facts randomly.  States 2 weeks ago she was driving and had sudden onset of severe fatigue she had to stop and get orange juice.  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  States she doesn't sleep well at night.  Endorses broken sleep.  Endorses snoring.  States she does not tolerate trazodone due to increased drowsiness.       Objective   Vital Signs:   /56   Pulse 90   Ht 163.8 cm (64.5\")   Wt 79.9 kg (176 lb 1.6 oz)   BMI 29.76 kg/m²     Physical Exam  HENT:      Head: Normocephalic.   Pulmonary:      Effort: Pulmonary effort is normal.   Neurological:      Mental Status: She is alert and oriented to person, place, and time.      Cranial Nerves: Cranial nerves are intact.      Sensory: Sensation is intact.      Motor: Motor function is intact.      Coordination: Coordination is intact.      Deep Tendon Reflexes: Reflexes are normal and symmetric.        Neurologic Exam     Mental Status   Oriented to person, place, and time.        Result Review :            MoCA: 27/30     Assessment and Plan    Diagnoses and all orders for this visit:    1. Memory loss (Primary)  Assessment & Plan:  Will order MRI brain and labs for further evaluation of memory difficulty.  Discussed that if testing is unrevealing we will order neuropsychological testing. "     Orders:  -     MRI Brain With & Without Contrast; Future  -     CBC (No Diff); Future  -     Comprehensive Metabolic Panel; Future  -     Vitamin B12 & Folate; Future  -     Homocysteine, serum; Future  -     Methylmalonic Acid, Serum; Future  -     Ambulatory Referral to Sleep Medicine    2. Tinnitus of both ears  -     MRI Brain With & Without Contrast; Future  -     CBC (No Diff); Future  -     Comprehensive Metabolic Panel; Future  -     Vitamin B12 & Folate; Future  -     Homocysteine, serum; Future  -     Methylmalonic Acid, Serum; Future    3. Snores  Assessment & Plan:  Will refer to sleep medicine for consult due to snoring and unrefreshing sleep in the presence of memory loss and obesity.     Orders:  -     MRI Brain With & Without Contrast; Future  -     CBC (No Diff); Future  -     Comprehensive Metabolic Panel; Future  -     Vitamin B12 & Folate; Future  -     Homocysteine, serum; Future  -     Methylmalonic Acid, Serum; Future  -     Ambulatory Referral to Sleep Medicine    4. Unrefreshed by sleep  -     MRI Brain With & Without Contrast; Future  -     CBC (No Diff); Future  -     Comprehensive Metabolic Panel; Future  -     Vitamin B12 & Folate; Future  -     Homocysteine, serum; Future  -     Methylmalonic Acid, Serum; Future  -     Ambulatory Referral to Sleep Medicine    I spent 40 minutes caring for Vanesa on this date of service. This time includes time spent by me in the following activities:preparing for the visit, reviewing tests, obtaining and/or reviewing a separately obtained history, performing a medically appropriate examination and/or evaluation , counseling and educating the patient/family/caregiver, ordering medications, tests, or procedures, documenting information in the medical record and independently interpreting results and communicating that information with the patient/family/caregiver  Follow Up   Return in about 2 months (around 11/21/2022) for memory f/u.  Patient was  given instructions and counseling regarding her condition or for health maintenance advice. Please see specific information pulled into the AVS if appropriate.

## 2022-09-22 DIAGNOSIS — M79.7 FIBROMYALGIA: ICD-10-CM

## 2022-09-22 DIAGNOSIS — F32.0 MAJOR DEPRESSIVE DISORDER, SINGLE EPISODE, MILD: ICD-10-CM

## 2022-09-22 PROBLEM — R06.83 SNORES: Status: ACTIVE | Noted: 2022-09-22

## 2022-09-22 RX ORDER — ESCITALOPRAM OXALATE 20 MG/1
20 TABLET ORAL DAILY
Qty: 90 TABLET | Refills: 0 | Status: SHIPPED | OUTPATIENT
Start: 2022-09-22 | End: 2022-12-05 | Stop reason: SDUPTHER

## 2022-09-22 NOTE — ASSESSMENT & PLAN NOTE
Will order MRI brain and labs for further evaluation of memory difficulty.  Discussed that if testing is unrevealing we will order neuropsychological testing.

## 2022-09-22 NOTE — ASSESSMENT & PLAN NOTE
Will refer to sleep medicine for consult due to snoring and unrefreshing sleep in the presence of memory loss and obesity.

## 2022-09-28 ENCOUNTER — HOSPITAL ENCOUNTER (OUTPATIENT)
Dept: ULTRASOUND IMAGING | Facility: HOSPITAL | Age: 57
Discharge: HOME | End: 2022-09-28
Admitting: NURSE PRACTITIONER

## 2022-09-28 ENCOUNTER — HOSPITAL ENCOUNTER (OUTPATIENT)
Dept: NUCLEAR MEDICINE | Facility: HOSPITAL | Age: 57
Discharge: HOME | End: 2022-09-28

## 2022-09-28 DIAGNOSIS — R11.0 NAUSEA: ICD-10-CM

## 2022-09-28 DIAGNOSIS — R15.2 FECAL URGENCY: ICD-10-CM

## 2022-09-28 PROCEDURE — 78227 HEPATOBIL SYST IMAGE W/DRUG: CPT

## 2022-09-28 PROCEDURE — 0 TECHNETIUM TC 99M MEBROFENIN KIT: Performed by: NURSE PRACTITIONER

## 2022-09-28 PROCEDURE — A9537 TC99M MEBROFENIN: HCPCS | Performed by: NURSE PRACTITIONER

## 2022-09-28 PROCEDURE — 76705 ECHO EXAM OF ABDOMEN: CPT

## 2022-09-28 RX ORDER — KIT FOR THE PREPARATION OF TECHNETIUM TC 99M MEBROFENIN 45 MG/10ML
1 INJECTION, POWDER, LYOPHILIZED, FOR SOLUTION INTRAVENOUS
Status: COMPLETED | OUTPATIENT
Start: 2022-09-28 | End: 2022-09-28

## 2022-09-28 RX ADMIN — KIT FOR THE PREPARATION OF TECHNETIUM TC 99M MEBROFENIN 1 DOSE: 45 INJECTION, POWDER, LYOPHILIZED, FOR SOLUTION INTRAVENOUS at 11:10

## 2022-09-29 ENCOUNTER — TELEPHONE (OUTPATIENT)
Dept: GASTROENTEROLOGY | Facility: CLINIC | Age: 57
End: 2022-09-29

## 2022-09-29 NOTE — TELEPHONE ENCOUNTER
----- Message from XIAO Workman sent at 9/28/2022  3:21 PM EDT -----  Normal abdominal ultrasound.  No acute findings seen.

## 2022-10-10 ENCOUNTER — HOSPITAL ENCOUNTER (OUTPATIENT)
Dept: MRI IMAGING | Facility: HOSPITAL | Age: 57
Discharge: HOME | End: 2022-10-10
Admitting: NURSE PRACTITIONER

## 2022-10-10 DIAGNOSIS — H93.13 TINNITUS OF BOTH EARS: ICD-10-CM

## 2022-10-10 DIAGNOSIS — R41.3 MEMORY LOSS: ICD-10-CM

## 2022-10-10 DIAGNOSIS — R06.83 SNORES: ICD-10-CM

## 2022-10-10 DIAGNOSIS — G47.8 UNREFRESHED BY SLEEP: ICD-10-CM

## 2022-10-10 PROCEDURE — 70553 MRI BRAIN STEM W/O & W/DYE: CPT

## 2022-10-10 PROCEDURE — A9577 INJ MULTIHANCE: HCPCS | Performed by: NURSE PRACTITIONER

## 2022-10-10 PROCEDURE — 0 GADOBENATE DIMEGLUMINE 529 MG/ML SOLUTION: Performed by: NURSE PRACTITIONER

## 2022-10-10 RX ADMIN — GADOBENATE DIMEGLUMINE 17 ML: 529 INJECTION, SOLUTION INTRAVENOUS at 14:07

## 2022-10-24 ENCOUNTER — TELEPHONE (OUTPATIENT)
Dept: NEUROLOGY | Facility: CLINIC | Age: 57
End: 2022-10-24

## 2022-10-24 NOTE — TELEPHONE ENCOUNTER
Caller: JOCELIN     Best call back number: [phone]    What test was performed: MRI     When was the test performed: 10.10.22    Where was the test performed: [facility]     Additional notes: PT WOULD LIKE TEST RESULTS     PLEASE ADVISE.

## 2022-11-04 ENCOUNTER — TELEPHONE (OUTPATIENT)
Dept: NEUROLOGY | Facility: CLINIC | Age: 57
End: 2022-11-04

## 2022-11-04 NOTE — TELEPHONE ENCOUNTER
Provider: ERIN SOUTH APRN    Caller: JOCELIN    Relationship to Patient: SELF    Phone Number: [phone]    Reason for Call: WAS TOLD BY HER INSURANCE THAT THEY DID NOT HAVE A PRE-CERTIFCTION  CODE WAS WRONG FOR THE MRI OF BRAIN ON 10-10-22.  STATED THE PROVIDER CAN DO A RETRO CERTIFICATION CODE.   STATED TO CALL THE [employer] [employer] TO THE THE CERT CODE  [phone] AND  STATED TO FAX THE CERT CODE  TO [facility] [phone].     PLEASE ADVISE

## 2022-11-16 ENCOUNTER — TELEPHONE (OUTPATIENT)
Dept: GASTROENTEROLOGY | Facility: CLINIC | Age: 57
End: 2022-11-16

## 2022-11-30 NOTE — TELEPHONE ENCOUNTER
Pt has been scheduled for 04/05/23. Pt aware PAT will with an arrival time a few days in advance.

## 2022-12-05 ENCOUNTER — OFFICE VISIT (OUTPATIENT)
Dept: FAMILY MEDICINE CLINIC | Age: 57
End: 2022-12-05

## 2022-12-05 VITALS
HEIGHT: 65 IN | WEIGHT: 176 LBS | BODY MASS INDEX: 29.32 KG/M2 | SYSTOLIC BLOOD PRESSURE: 125 MMHG | DIASTOLIC BLOOD PRESSURE: 71 MMHG | OXYGEN SATURATION: 95 % | HEART RATE: 84 BPM

## 2022-12-05 DIAGNOSIS — K21.00 GASTROESOPHAGEAL REFLUX DISEASE WITH ESOPHAGITIS, UNSPECIFIED WHETHER HEMORRHAGE: ICD-10-CM

## 2022-12-05 DIAGNOSIS — E55.9 VITAMIN D DEFICIENCY, UNSPECIFIED: ICD-10-CM

## 2022-12-05 DIAGNOSIS — M79.7 FIBROMYALGIA: Primary | ICD-10-CM

## 2022-12-05 DIAGNOSIS — F51.01 PRIMARY INSOMNIA: ICD-10-CM

## 2022-12-05 DIAGNOSIS — R41.3 MEMORY LOSS: ICD-10-CM

## 2022-12-05 DIAGNOSIS — F32.0 MAJOR DEPRESSIVE DISORDER, SINGLE EPISODE, MILD: ICD-10-CM

## 2022-12-05 DIAGNOSIS — L20.84 INTRINSIC ECZEMA: ICD-10-CM

## 2022-12-05 PROCEDURE — 99214 OFFICE O/P EST MOD 30 MIN: CPT | Performed by: FAMILY MEDICINE

## 2022-12-05 RX ORDER — BUSPIRONE HYDROCHLORIDE 10 MG/1
5 TABLET ORAL 2 TIMES DAILY
Qty: 180 TABLET | Refills: 1 | Status: SHIPPED | OUTPATIENT
Start: 2022-12-05

## 2022-12-05 RX ORDER — ALCLOMETASONE DIPROPIONATE 0.5 MG/G
CREAM TOPICAL SEE ADMIN INSTRUCTIONS
Qty: 45 G | Refills: 1 | Status: SHIPPED | OUTPATIENT
Start: 2022-12-05

## 2022-12-05 RX ORDER — ESOMEPRAZOLE MAGNESIUM 40 MG/1
40 CAPSULE, DELAYED RELEASE ORAL 2 TIMES DAILY PRN
Qty: 180 CAPSULE | Refills: 1 | Status: SHIPPED | OUTPATIENT
Start: 2022-12-05

## 2022-12-05 RX ORDER — FAMOTIDINE 40 MG/1
40 TABLET, FILM COATED ORAL DAILY PRN
Qty: 90 TABLET | Refills: 1 | Status: SHIPPED | OUTPATIENT
Start: 2022-12-05

## 2022-12-05 RX ORDER — ESCITALOPRAM OXALATE 20 MG/1
20 TABLET ORAL DAILY
Qty: 90 TABLET | Refills: 1 | Status: SHIPPED | OUTPATIENT
Start: 2022-12-05

## 2022-12-05 RX ORDER — TRAZODONE HYDROCHLORIDE 50 MG/1
50 TABLET ORAL NIGHTLY PRN
Qty: 90 TABLET | Refills: 1 | Status: ON HOLD | OUTPATIENT
Start: 2022-12-05 | End: 2023-04-05

## 2022-12-05 NOTE — PROGRESS NOTES
Vanesa Esquivel presents to [facility].    Chief Complaint: depression    Subjective       History of Present Illness:  HPI   Recent URI, she is 10 days in and starting to feel better, c/o fatigue.  Tested for covid and was negative    Her tinnitis persists and is unchanged.    She had an MRI brain for memory loss, saw Graciela Helm, had a neg MRI.    she has a sister dx with brain tumor     Vanesa is following up on her depression which is stable on lexapro 20 mg daily and buspar 5 mg bid.  .  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  Her dad passed aware and she no longer is taking care of her grandbabbies.  She is sleeping good recently, on trazodone prn.  Denies suicidal ideation.   She has longstanding fibromyagia with fatigue, unable to work due to all over muscular pain.         She is on nexium for GERD and it is worse and she needs an EGD- we have her set up for testing recheduled due to recent illness.  She sees NP davin.    Review of Systems:  Review of Systems   Constitutional: Positive for fatigue. Negative for chills and fever.   HENT: Positive for congestion and rhinorrhea. Negative for ear pain, sinus pressure and sore throat.    Eyes: Negative for blurred vision and double vision.   Respiratory: Negative for cough, shortness of breath and wheezing.    Cardiovascular: Negative for chest pain and palpitations.   Gastrointestinal: Negative for abdominal pain, blood in stool, constipation, diarrhea, nausea and vomiting.   Skin: Negative for rash.   Neurological: Negative for dizziness and headache.   Psychiatric/Behavioral: Negative for depressed mood.        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   • Other atopic dermatitis   • Other pruritus   • Other specified anxiety disorders   • Overweight   • Primary generalized hypertrophic osteoarthrosis   • Tinnitus, bilateral   • Vitamin D deficiency, unspecified     Past Surgical History:   • HYSTERECTOMY    ABN PAP   • MOUTH SURGERY   • OVARIAN CYST SURGERY      Family History   Problem Relation Age of Onset   • Alcohol abuse Mother    • Heart disease Mother    • Hypertension Mother    • Stroke Father    • Alcohol abuse Father    • Diabetes Father    • Hyperlipidemia Father    • Heart attack Maternal Grandmother    • Cancer Maternal Grandfather    • Stroke Paternal Grandmother    • Cancer Paternal Grandfather      Social History     Tobacco Use   • Smoking status: Never   • Smokeless tobacco: Never   Substance Use Topics   • Alcohol use: Not Currently       There are no preventive care reminders to display for this patient.     Immunization History   Administered Date(s) Administered   • COVID-19 (MODERNA) 1st, 2nd, 3rd Dose Only 02/12/2021, 03/12/2021   • COVID-19 (MODERNA) BOOSTER 12/10/2021, 04/12/2022   • Flu Vaccine Intradermal Quad 18-64YR 09/15/2020   • Influenza, Unspecified 09/17/2020, 10/27/2022   • Tdap 06/02/2022       Allergies   Allergen Reactions   • Carafate [Sucralfate] Other (See Comments)     Tightness around her throat, dry mouth        Medications:  Current Outpatient Medications on File Prior to Visit   Medication Sig   • Bioflavonoid Products (GRAPE SEED PO) Take  by mouth.   • CBD (cannabidiol) oral oil Take  by mouth.   • cholecalciferol (VITAMIN D3) 25 MCG (1000 UT) tablet Take 1,000 Units by mouth Daily.   • Flaxseed, Linseed, (FLAX SEED OIL PO) Take  by mouth.   • Misc Natural Products (BLACK CHERRY CONCENTRATE PO) Take  by mouth.   • Multiple Vitamins-Minerals (EMERGEN-C IMMUNE PO) Take  by mouth.  "  • Zinc 50 MG capsule Take  by mouth.   • [DISCONTINUED] alclometasone (ACLOVATE) 0.05 % cream Apply  topically to the appropriate area as directed See Admin Instructions. apply topically to the appropriate area as directed 2 times a day   • [DISCONTINUED] busPIRone (BUSPAR) 10 MG tablet Take 0.5 tablets by mouth 2 (Two) Times a Day.   • [DISCONTINUED] diphenhydrAMINE (BENADRYL) 25 mg capsule Take 25 mg by mouth Every Night.   • [DISCONTINUED] escitalopram (LEXAPRO) 20 MG tablet TAKE 1 TABLET BY MOUTH DAILY   • [DISCONTINUED] esomeprazole (nexIUM) 40 MG capsule Take 1 capsule by mouth 2 (Two) Times a Day As Needed (GERD).   • [DISCONTINUED] famotidine (PEPCID) 40 MG tablet Take 1 tablet by mouth Daily As Needed for Indigestion or Heartburn.   • [DISCONTINUED] traZODone (DESYREL) 50 MG tablet Take 1 tablet by mouth At Night As Needed for Sleep.   • Sodium Sulfate-Mag Sulfate-KCl (Sutab) 8010-975-999 MG tablet Take 12 tablets by mouth Take As Directed. Take 12 tablets at 6 pm and 12 tablets 4 hours prior to procedure.   • [DISCONTINUED] acetaminophen (TYLENOL) 500 MG tablet Take 500 mg by mouth Every 6 (Six) Hours As Needed for Mild Pain .     No current facility-administered medications on file prior to visit.       Vital Signs:   /71 (BP Location: Right arm, Patient Position: Sitting, Cuff Size: Adult)   Pulse 84   Ht 163.8 cm (64.5\")   Wt 79.8 kg (176 lb)   SpO2 95%   BMI 29.74 kg/m²       Physical Exam:  Physical Exam  Vitals and nursing note reviewed.   Constitutional:       General: She is not in acute distress.     Appearance: Normal appearance. She is not ill-appearing, toxic-appearing or diaphoretic.   HENT:      Head: Normocephalic and atraumatic.      Right Ear: Tympanic membrane, ear canal and external ear normal.      Left Ear: Tympanic membrane, ear canal and external ear normal.      Nose: No congestion or rhinorrhea.      Mouth/Throat:      Mouth: Mucous membranes are moist.      Pharynx: " Oropharynx is clear. No oropharyngeal exudate or posterior oropharyngeal erythema.   Eyes:      Extraocular Movements: Extraocular movements intact.      Conjunctiva/sclera: Conjunctivae normal.      Pupils: Pupils are equal, round, and reactive to light.   Cardiovascular:      Rate and Rhythm: Normal rate and regular rhythm.      Heart sounds: Normal heart sounds.   Pulmonary:      Effort: Pulmonary effort is normal.      Breath sounds: Normal breath sounds. No wheezing, rhonchi or rales.   Abdominal:      General: Abdomen is flat.      Palpations: Abdomen is soft.   Musculoskeletal:      Cervical back: Neck supple. No rigidity.   Lymphadenopathy:      Cervical: No cervical adenopathy.   Skin:     General: Skin is warm and dry.   Neurological:      Mental Status: She is alert and oriented to person, place, and time.   Psychiatric:         Mood and Affect: Mood normal.         Behavior: Behavior normal.         Result Review      The following data was reviewed by Fina Bardales MD on 12/05/2022.  Lab Results   Component Value Date    WBC 5.71 06/10/2022    HGB 12.6 06/10/2022    HCT 37.4 06/10/2022    MCV 84.6 06/10/2022     06/10/2022     Lab Results   Component Value Date    GLUCOSE 97 06/10/2022    BUN 9 06/10/2022    CREATININE 0.74 06/10/2022    BCR 12.2 06/10/2022    K 4.5 06/10/2022    CO2 24.9 06/10/2022    CALCIUM 9.4 06/10/2022    ALBUMIN 4.20 06/10/2022    LABIL2 1.4 04/01/2021    AST 17 06/10/2022    ALT 15 06/10/2022     Lab Results   Component Value Date    CHOL 139 06/10/2022    CHLPL 169 04/01/2021    TRIG 85 06/10/2022    HDL 83 (H) 06/10/2022    LDL 40 06/10/2022     No results found for: TSH  No results found for: HGBA1C  No results found for: PSA                    Assessment and Plan:          Diagnoses and all orders for this visit:    1. Fibromyalgia (Primary)  Comments:  Chronic pain is under control.  Continue current meds  Orders:  -     escitalopram (LEXAPRO) 20 MG tablet;  Take 1 tablet by mouth Daily.  Dispense: 90 tablet; Refill: 1  -     busPIRone (BUSPAR) 10 MG tablet; Take 0.5 tablets by mouth 2 (Two) Times a Day.  Dispense: 180 tablet; Refill: 1    2. Major depressive disorder, single episode, mild (HCC)  Comments:  Stable and she is in a good place.  Continue Lexapro and BuSpar as directed  Orders:  -     escitalopram (LEXAPRO) 20 MG tablet; Take 1 tablet by mouth Daily.  Dispense: 90 tablet; Refill: 1  -     busPIRone (BUSPAR) 10 MG tablet; Take 0.5 tablets by mouth 2 (Two) Times a Day.  Dispense: 180 tablet; Refill: 1    3. Vitamin D deficiency, unspecified    4. Gastroesophageal reflux disease with esophagitis, unspecified whether hemorrhage  Comments:  Stable on Nexium.  No changes needed in current medications or treatment plan.  Okay for famotidine as needed  Orders:  -     esomeprazole (nexIUM) 40 MG capsule; Take 1 capsule by mouth 2 (Two) Times a Day As Needed (GERD).  Dispense: 180 capsule; Refill: 1    5. Memory loss  Comments:  Improved and stable.  No new or acute issues    6. Intrinsic eczema  Comments:  Recurrent.  Will refill Aclovate 0.05% cream to restart  Orders:  -     alclometasone (ACLOVATE) 0.05 % cream; Apply  topically to the appropriate area as directed See Admin Instructions. apply topically to the appropriate area as directed 2 times a day  Dispense: 45 g; Refill: 1    7. Primary insomnia  Comments:  Stable and doing well on trazodone.  Medications refilled, she tolerates medication well  Orders:  -     traZODone (DESYREL) 50 MG tablet; Take 1 tablet by mouth At Night As Needed for Sleep.  Dispense: 90 tablet; Refill: 1    Other orders  -     famotidine (PEPCID) 40 MG tablet; Take 1 tablet by mouth Daily As Needed for Indigestion or Heartburn.  Dispense: 90 tablet; Refill: 1          Follow Up   Return in about 6 months (around 6/5/2023) for Annual physical.

## 2022-12-12 ENCOUNTER — OFFICE VISIT (OUTPATIENT)
Dept: SLEEP MEDICINE | Facility: HOSPITAL | Age: 57
End: 2022-12-12

## 2022-12-12 VITALS
HEART RATE: 84 BPM | DIASTOLIC BLOOD PRESSURE: 54 MMHG | BODY MASS INDEX: 28.79 KG/M2 | WEIGHT: 172.8 LBS | OXYGEN SATURATION: 96 % | HEIGHT: 65 IN | SYSTOLIC BLOOD PRESSURE: 120 MMHG

## 2022-12-12 DIAGNOSIS — R06.83 SNORING: ICD-10-CM

## 2022-12-12 DIAGNOSIS — G47.8 NON-RESTORATIVE SLEEP: ICD-10-CM

## 2022-12-12 DIAGNOSIS — G47.10 HYPERSOMNIA: ICD-10-CM

## 2022-12-12 DIAGNOSIS — F51.04 PSYCHOPHYSIOLOGICAL INSOMNIA: ICD-10-CM

## 2022-12-12 DIAGNOSIS — G47.30 OBSERVED SLEEP APNEA: Primary | ICD-10-CM

## 2022-12-12 DIAGNOSIS — R51.9 MORNING HEADACHE: ICD-10-CM

## 2022-12-12 PROCEDURE — G0463 HOSPITAL OUTPT CLINIC VISIT: HCPCS

## 2022-12-12 PROCEDURE — 99204 OFFICE O/P NEW MOD 45 MIN: CPT | Performed by: INTERNAL MEDICINE

## 2022-12-12 NOTE — PROGRESS NOTES
[address] [address]  [place]   [place] [address]  Phone: [phone]  Fax: [phone]      Vanesa Esquivel  [id number]   1965  57 y.o.  female      Referring physician/provider [facility] neurolog[facility]  PCP Fina Bardales MD    Type of service: Initial Sleep Medicine Consult.  Date of service: 12/12/2022      Chief Complaint   Patient presents with   • Witnessed Apnea   • Snoring   • Non-restorative Sleep   • Fatigue   • Dry Mouth   • Morning Headaches   • Daytime Sleepiness   • Insomnia       History of present illness;  Thank you for asking to see Vanesa Esquivel, 57 y.o.. The patient was seen today on 12/12/2022 at [facility].  The patient presents today with symptoms of snoring, non-restorative sleep and witnessed apneas. The symptoms are present for 3 to 4-year and they are persistent in nature.  The snoring is present in all positions and it is loud.  Patient has no prior surgery namely tonsillectomy, nasal surgery and UPPP.  She also gives a history of headaches in the morning feels not rested at all.  She is retired even though she sleeps for 10 to 12 hours she still feels not rested    Patient gives the following sleep history.  Sleep schedule:  Bedtime: 12 midnight  Wake time: 10:30 AM  Normally takes about 30-60 minutes to fall asleep  Average hours of sleep 10-12  Number of naps per day none  Symptoms   In addition to snoring, nonrestorative sleep and witnessed apneas patient gives the following associated symptoms.  Have you ever awakened gasping for breath, coughing, choking: Yes   Change in weight,  No   Morning headaches  Yes   Awaken with a sore throat or dry mouth  Yes   Leg jerking at night:  No   Crawly feeling/urge sensation to move in the legs: No   Teeth grinding:Yes   Have you ever awakened at night with a sour taste or burning sensation in your chest:  No   Do you have muscle weakness with laughing or anger or sleep  "paralysis:  No   Have you ever felt paralyzed while going to sleep or waking up:  No   Sleepwalking, nightmares, No   Nocturia (urination at night): 2 times per night  Memory Problem:No     MEDICAL CONDITIONS (PMH)  1. Acid reflux  2. Depression  3. Anxiety  4. GERD    Social history:  Do you drive a commercial vehicle:  No   Shift work:  No   Tobacco use:  No   Alcohol use: 0 per week  Caffeinated drinks: 2    Family Hx (your parents and siblings)  1. Thyroid disorder  2. Sleep apnea  3. Parkinson's disease, grandmother     Medications: reviewed    Review of systems:  Sleep: Positve for snoring,witnessed apnea and daytime excessive sleepiness with [place] Sleepiness Scale of Total score: 11   Kidney/ Bladder  Difficulty In Urination: negative  Bed Wetting: negative  Frequent Urination: positive  HEENT  Recurrent Nose Bleeds: negative  Ear pain: negative  Sores In Mouth: negative  Persistent Hoarseness: negative  Nasal Congestion: negative  Post Nasal Drip: negative  Musculoskeletal:  Neck Pain: negative  Temporomandibular Joint Pain: positive  General:  Fever: negative  Fatigue: positive  Cardiovascular:  Irregular Heartbeat: negative  Swollen Ankles Or Legs: negative  Respiratory:  Shortness Of Breath: negative  Wheezing: negative  Neuro/Paych:  Fainting Spells: negative  Dizziness: negative  Anxiety: positive  Depression: positive  Gastrointestinal:  Problem Swallowing: negative  Frequent Heartburn: positive  Abdominal Bloating: negative  Skin:  Rash: negative  Change In Nails: negative  Endocrine:  Excessive Thirst: negative  Always Too Cold: positive  Always Too Warm: negative  Hem/Lymphatic:  Swollen Glands: negative  Burses/ Bleeds Easily: negative    Physical exam:  CONSTITUTINONAL:  Vitals:    22 1300   BP: 120/54   Pulse: 84   SpO2: 96%   Weight: 78.4 kg (172 lb 12.8 oz)   Height: 163.8 cm (64.5\")    Body mass index is 29.2 kg/m².   HEAD: atraumatic, normocephalic   EYES:pupils are round, " equal and reacting to light and accommodation, conjunctiva normal  NOSE:no nasal septal defects, nasal passages are clear, no nasal polyps,   THROAT: tonsils are not enlarged, tongue normal size, oral airway Mallampati class 3  NECK:Neck Circumference: 13.5 inches, trachea is in the midline, thyroid not enlarged  RESPIRATORY SYSTEM: Breath sounds are normal, there are no wheezes  CARDIOVASULAR SYSTEM: Heart sounds are regular rhythm and normal rate, there are no murmurs or thrills, no edema  GASTROINTESTINAL: Soft and non-tender,liver not enlarged, no evidence of ascites  MUSCULOSKELETAL SYSTEM: Full range of motion's in all the 4 extremities, neck movement not restricted, temporomandibular joint movement normal and no tenderness  SKIN: Warm and moist, no cyanosis, no clubbing,  NEUROLOGICAL SYSTEM: Oriented x 3, no gross neurological defects, No speech defect, gait is normal  PSYCHIATRIC SYSTEM: Mood is normal, thought content is normal      Assessment and plan:  · Witnessed apneas,(R06.81) patient's symptoms and examination is consistent with sleep apnea (G47.30). I have talked to the patient about the signs and symptoms of sleep apnea. In addition, I have also discussed pathophysiology of sleep apnea.  I also discussed the complications of untreated sleep apnea including effects on hypertension, diabetes mellitus and nonrestorative sleep with hypersomnia which can increase risk for motor vehicle accidents.  Untreated sleep apnea is also a risk factor for development of atrial fibrillation, pulmonary hypertension and stroke.  Discussed in detail of various testing methods including home-based and lab based sleep studies.  Based on history and physical examination and other comorbidities the most appropriate study is home sleep test.  The order for the sleep study is placed in [facility].  The test will be scheduled after approval from insurance. Treatment and management will be discussed after the test is completed.   Patient was given opportunity to ask questions and all the questions were answered.   · Snoring (R06.83), snoring is the sound created by turbulent airflow vibrating upper airway soft tissue due to limitation of inspiratory airflow. I have also discussed factors affecting snoring including sleep deprivation, sleeping on the back and alcohol ingestion. To minimize snoring, patient is advised to have adequate sleep, sleep on the side and avoid alcohol and sedative medications before bedtime  · Daytime excessive sleepiness .  It was assessed with [place] Sleepiness Scale of Total score: 11.  There are many causes for daytime excessive sleepiness including sleep depression, shiftwork syndrome, depression and other medical disorders including heart, kidney and liver failure.  The most serious cause of excessive sleepiness is due to neurological conditions like narcolepsy/cataplexy.  But the most common cause of excessive sleepiness is due to sleep apnea with frequent awakenings during sleep time.  I have discussed safety of driving and to remain vigilant while driving.  · Morning headache,    · Insomnia    I have also discussed with the patient the following  • Sleep hygiene: Maintaining a regular bedtime and wake time, not to watch television or work in bed, limit caffeine-containing beverages before bed time and avoid naps during the day  • Adequate amount of sleep.  Generally most people needs about 7 to 8 hours of sleep.    Return for 31 to 90 days after PAP setup with down load..  Patient's questions were answered      I once again thank you for asking me to see this patient in consultation and I have forwarded my opinion and treatment plan.  Please do not hesitate to call me if you have any questions.     Vandana Wong MD  Sleep Medicine  Medical Director, [facility]  12/12/2022 ,

## 2023-02-14 ENCOUNTER — LAB (OUTPATIENT)
Dept: LAB | Facility: HOSPITAL | Age: 58
End: 2023-02-14
Payer: COMMERCIAL

## 2023-02-14 ENCOUNTER — TELEPHONE (OUTPATIENT)
Dept: GASTROENTEROLOGY | Facility: CLINIC | Age: 58
End: 2023-02-14
Payer: COMMERCIAL

## 2023-02-14 DIAGNOSIS — R41.3 MEMORY LOSS: ICD-10-CM

## 2023-02-14 DIAGNOSIS — R06.83 SNORES: ICD-10-CM

## 2023-02-14 DIAGNOSIS — G47.8 UNREFRESHED BY SLEEP: ICD-10-CM

## 2023-02-14 DIAGNOSIS — H93.13 TINNITUS OF BOTH EARS: ICD-10-CM

## 2023-02-14 PROCEDURE — 83090 ASSAY OF HOMOCYSTEINE: CPT

## 2023-02-14 PROCEDURE — 82746 ASSAY OF FOLIC ACID SERUM: CPT

## 2023-02-14 PROCEDURE — 85027 COMPLETE CBC AUTOMATED: CPT

## 2023-02-14 PROCEDURE — 80053 COMPREHEN METABOLIC PANEL: CPT

## 2023-02-14 PROCEDURE — 36415 COLL VENOUS BLD VENIPUNCTURE: CPT

## 2023-02-14 PROCEDURE — 82607 VITAMIN B-12: CPT

## 2023-02-14 PROCEDURE — 83921 ORGANIC ACID SINGLE QUANT: CPT

## 2023-02-14 NOTE — TELEPHONE ENCOUNTER
Patient called and states that her symptoms have improved and didn't know if she wanted to proceed with egd. Pt encouraged to proceed since she states she is still symptomatic and also still experiencing dysphagia.

## 2023-02-15 LAB
ALBUMIN SERPL-MCNC: 4.7 G/DL (ref 3.5–5.2)
ALBUMIN/GLOB SERPL: 1.7 G/DL
ALP SERPL-CCNC: 124 U/L (ref 39–117)
ALT SERPL W P-5'-P-CCNC: 15 U/L (ref 1–33)
ANION GAP SERPL CALCULATED.3IONS-SCNC: 9 MMOL/L (ref 5–15)
AST SERPL-CCNC: 21 U/L (ref 1–32)
BILIRUB SERPL-MCNC: 0.5 MG/DL (ref 0–1.2)
BUN SERPL-MCNC: 9 MG/DL (ref 6–20)
BUN/CREAT SERPL: 12.5 (ref 7–25)
CALCIUM SPEC-SCNC: 9.5 MG/DL (ref 8.6–10.5)
CHLORIDE SERPL-SCNC: 104 MMOL/L (ref 98–107)
CO2 SERPL-SCNC: 28 MMOL/L (ref 22–29)
CREAT SERPL-MCNC: 0.72 MG/DL (ref 0.57–1)
DEPRECATED RDW RBC AUTO: 40 FL (ref 37–54)
EGFRCR SERPLBLD CKD-EPI 2021: 98.3 ML/MIN/1.73
ERYTHROCYTE [DISTWIDTH] IN BLOOD BY AUTOMATED COUNT: 13 % (ref 12.3–15.4)
FOLATE SERPL-MCNC: 12.4 NG/ML (ref 4.78–24.2)
GLOBULIN UR ELPH-MCNC: 2.7 GM/DL
GLUCOSE SERPL-MCNC: 73 MG/DL (ref 65–99)
HCT VFR BLD AUTO: 39.9 % (ref 34–46.6)
HCYS SERPL-MCNC: 10.4 UMOL/L (ref 0–15)
HGB BLD-MCNC: 13.9 G/DL (ref 12–15.9)
MCH RBC QN AUTO: 29.8 PG (ref 26.6–33)
MCHC RBC AUTO-ENTMCNC: 34.8 G/DL (ref 31.5–35.7)
MCV RBC AUTO: 85.4 FL (ref 79–97)
PLATELET # BLD AUTO: 366 10*3/MM3 (ref 140–450)
PMV BLD AUTO: 10.1 FL (ref 6–12)
POTASSIUM SERPL-SCNC: 4.2 MMOL/L (ref 3.5–5.2)
PROT SERPL-MCNC: 7.4 G/DL (ref 6–8.5)
RBC # BLD AUTO: 4.67 10*6/MM3 (ref 3.77–5.28)
SODIUM SERPL-SCNC: 141 MMOL/L (ref 136–145)
VIT B12 BLD-MCNC: 848 PG/ML (ref 211–946)
WBC NRBC COR # BLD: 8.22 10*3/MM3 (ref 3.4–10.8)

## 2023-02-21 LAB — METHYLMALONATE SERPL-SCNC: 135 NMOL/L (ref 0–378)

## 2023-03-22 ENCOUNTER — TELEPHONE (OUTPATIENT)
Dept: GASTROENTEROLOGY | Facility: CLINIC | Age: 58
End: 2023-03-22
Payer: COMMERCIAL

## 2023-04-03 ENCOUNTER — OFFICE VISIT (OUTPATIENT)
Dept: FAMILY MEDICINE CLINIC | Age: 58
End: 2023-04-03
Payer: COMMERCIAL

## 2023-04-03 VITALS
WEIGHT: 173 LBS | HEART RATE: 91 BPM | OXYGEN SATURATION: 98 % | BODY MASS INDEX: 29.53 KG/M2 | HEIGHT: 64 IN | SYSTOLIC BLOOD PRESSURE: 117 MMHG | DIASTOLIC BLOOD PRESSURE: 61 MMHG

## 2023-04-03 DIAGNOSIS — Z23 ENCOUNTER FOR IMMUNIZATION: ICD-10-CM

## 2023-04-03 DIAGNOSIS — M79.7 FIBROMYALGIA: ICD-10-CM

## 2023-04-03 DIAGNOSIS — N39.0 URINARY TRACT INFECTION WITHOUT HEMATURIA, SITE UNSPECIFIED: ICD-10-CM

## 2023-04-03 DIAGNOSIS — Z12.11 COLON CANCER SCREENING: ICD-10-CM

## 2023-04-03 DIAGNOSIS — K21.9 GASTROESOPHAGEAL REFLUX DISEASE WITHOUT ESOPHAGITIS: ICD-10-CM

## 2023-04-03 DIAGNOSIS — Z12.31 ENCOUNTER FOR SCREENING MAMMOGRAM FOR BREAST CANCER: ICD-10-CM

## 2023-04-03 DIAGNOSIS — Z78.0 POSTMENOPAUSAL STATE: ICD-10-CM

## 2023-04-03 DIAGNOSIS — Z00.00 ANNUAL PHYSICAL EXAM: Primary | ICD-10-CM

## 2023-04-03 DIAGNOSIS — F32.1 CURRENT MODERATE EPISODE OF MAJOR DEPRESSIVE DISORDER WITHOUT PRIOR EPISODE: ICD-10-CM

## 2023-04-03 LAB
BILIRUB BLD-MCNC: NEGATIVE MG/DL
BILIRUB UR QL STRIP: NEGATIVE
CLARITY UR: CLEAR
CLARITY, POC: CLEAR
COLOR UR: YELLOW
COLOR UR: YELLOW
DEVELOPER EXPIRATION DATE: NORMAL
DEVELOPER LOT NUMBER: NORMAL
EXPIRATION DATE: NORMAL
EXPIRATION DATE: NORMAL
FECAL OCCULT BLOOD SCREEN, POC: NEGATIVE
GLUCOSE UR STRIP-MCNC: NEGATIVE MG/DL
GLUCOSE UR STRIP-MCNC: NEGATIVE MG/DL
HGB UR QL STRIP.AUTO: NEGATIVE
KETONES UR QL STRIP: NEGATIVE
KETONES UR QL: NEGATIVE
LEUKOCYTE EST, POC: NEGATIVE
LEUKOCYTE ESTERASE UR QL STRIP.AUTO: NEGATIVE
Lab: 1512
Lab: NORMAL
NEGATIVE CONTROL: NEGATIVE
NITRITE UR QL STRIP: NEGATIVE
NITRITE UR-MCNC: NEGATIVE MG/ML
PH UR STRIP.AUTO: 7 [PH] (ref 5–8)
PH UR: 7.5 [PH] (ref 5–8)
POSITIVE CONTROL: POSITIVE
PROT UR QL STRIP: NEGATIVE
PROT UR STRIP-MCNC: NEGATIVE MG/DL
RBC # UR STRIP: NEGATIVE /UL
SP GR UR STRIP: 1.01 (ref 1–1.03)
SP GR UR: 1.01 (ref 1–1.03)
UROBILINOGEN UR QL STRIP: NORMAL
UROBILINOGEN UR QL: NORMAL

## 2023-04-03 PROCEDURE — 81003 URINALYSIS AUTO W/O SCOPE: CPT | Performed by: FAMILY MEDICINE

## 2023-04-03 RX ORDER — SULFAMETHOXAZOLE AND TRIMETHOPRIM 800; 160 MG/1; MG/1
1 TABLET ORAL EVERY 12 HOURS SCHEDULED
Status: ON HOLD | COMMUNITY
Start: 2023-03-06 | End: 2023-04-05

## 2023-04-03 RX ORDER — FLUCONAZOLE 150 MG/1
TABLET ORAL
COMMUNITY
Start: 2023-03-06 | End: 2023-04-03

## 2023-04-03 NOTE — PROGRESS NOTES
"Vanesa Esquivel presents to [facility].    Chief Complaint:  Abnormal vaginal tissue    Subjective     History of Present Illness:  HPI  Vanesa is in today with concerns about abnormal vaginal tissue, she is s/p hysterectomy and h/o 1 oophrectomy due to ovarian cysts, and she has h/o abnormal Paps and positive HPV and she was checking herself out for cancer and \"tobias of freaked out by what she sees down there\".  She has hemorrhoids and feels like something doesn't look normal in the vaginal area.  She feels vaginal dryness, worse since she had covid.  Last COLONOSCOPY: sched for 4.5.23 for EGD and colonoscopy  Last  EYE EXAM: 1-2 years ago  Last MAMMOGRAM: 7/6/22-NORMAL  Last DEXA: 7/6/22-NORMAL  Last Dental EXAM:  2-3 WEEKS AGO  ETOH use: RARELY  Tobacco use:  NEVER    Recent liver tests showed a borderline elevated alkaline phosphatase at 124.      She had an MRI brain for memory loss, negative for findings.  She has also been assessed with neurology Graciela Helm with negative work-up.  Of note, she has a sister dx with brain tumor     Vanesa has chronic depression with home stressors including her not being happy in her current relationship/marriage.  She is actually coping well and overall stable and doing well on lexapro 20 mg daily and buspar 5 mg bid.  She is sleeping okay with being on trazodone prn.  Denies suicidal ideation.   She has longstanding fibromyagia with fatigue is stable.  She uses CBD oil which tends to help her more than anything.       She is on nexium for GERD has no complaints today    Review of Systems:  Review of Systems   Constitutional: Positive for fatigue. Negative for chills and fever.   HENT: Negative for ear pain, sinus pressure and sore throat.    Eyes: Negative for blurred vision and double vision.   Respiratory: Negative for cough, shortness of breath and wheezing.    Cardiovascular: Negative for chest pain and palpitations.   Gastrointestinal: " "Negative for abdominal pain, blood in stool, constipation, diarrhea, nausea and vomiting.   Skin: Negative for rash.   Neurological: Negative for dizziness and headache.   Psychiatric/Behavioral: Positive for stress. Negative for sleep disturbance and suicidal ideas.        Objective     Medications:  Current Outpatient Medications on File Prior to Visit   Medication Sig   • alclometasone (ACLOVATE) 0.05 % cream Apply  topically to the appropriate area as directed See Admin Instructions. apply topically to the appropriate area as directed 2 times a day   • busPIRone (BUSPAR) 10 MG tablet Take 0.5 tablets by mouth 2 (Two) Times a Day.   • CBD (cannabidiol) oral oil Take  by mouth.   • cholecalciferol (VITAMIN D3) 25 MCG (1000 UT) tablet Take 1 tablet by mouth Daily.   • escitalopram (LEXAPRO) 20 MG tablet Take 1 tablet by mouth Daily.   • esomeprazole (nexIUM) 40 MG capsule Take 1 capsule by mouth 2 (Two) Times a Day As Needed (GERD).   • famotidine (PEPCID) 40 MG tablet Take 1 tablet by mouth Daily As Needed for Indigestion or Heartburn.   • Flaxseed, Linseed, (FLAX SEED OIL PO) Take  by mouth.   • Misc Natural Products (BLACK CHERRY CONCENTRATE PO) Take  by mouth.   • Multiple Vitamins-Minerals (EMERGEN-C IMMUNE PO) Take  by mouth.   • Zinc 50 MG capsule Take  by mouth.   • sulfamethoxazole-trimethoprim (BACTRIM DS,SEPTRA DS) 800-160 MG per tablet Take 1 tablet by mouth Every 12 (Twelve) Hours. (Patient not taking: Reported on 4/3/2023)   • traZODone (DESYREL) 50 MG tablet Take 1 tablet by mouth At Night As Needed for Sleep. (Patient not taking: Reported on 4/3/2023)     No current facility-administered medications on file prior to visit.       Vital Signs:   /61 (BP Location: Left arm, Patient Position: Sitting)   Pulse 91   Ht 163.8 cm (64.49\")   Wt 78.5 kg (173 lb)   SpO2 98%   BMI 29.25 kg/m²       Physical Exam:  Physical Exam  Exam conducted with a chaperone present.   Constitutional:       " General: She is not in acute distress.     Appearance: She is normal weight. She is not ill-appearing.   HENT:      Head: Normocephalic.      Right Ear: Tympanic membrane normal. There is no impacted cerumen.      Left Ear: Tympanic membrane normal. There is no impacted cerumen.      Mouth/Throat:      Mouth: Mucous membranes are moist.      Pharynx: Oropharynx is clear.   Eyes:      Extraocular Movements: Extraocular movements intact.      Pupils: Pupils are equal, round, and reactive to light.   Neck:      Vascular: No carotid bruit.   Cardiovascular:      Rate and Rhythm: Normal rate and regular rhythm.      Pulses: Normal pulses.      Heart sounds: No murmur heard.  Pulmonary:      Effort: Pulmonary effort is normal.      Breath sounds: No wheezing, rhonchi or rales.   Chest:   Breasts:     Manpreet Score is 5.      Right: Normal.      Left: Normal.   Abdominal:      General: Bowel sounds are normal.      Palpations: Abdomen is soft.      Tenderness: There is no abdominal tenderness.      Hernia: There is no hernia in the left inguinal area or right inguinal area.   Genitourinary:     General: Normal vulva.      Exam position: Knee-chest position.      Labia:         Right: No rash, tenderness, lesion or injury.         Left: No rash, tenderness, lesion or injury.       Urethra: No prolapse, urethral pain or urethral lesion.      Vagina: Normal.      Uterus: Absent.       Adnexa: Right adnexa normal and left adnexa normal.      Rectum: Guaiac result negative. External hemorrhoid present. No mass, tenderness, anal fissure or internal hemorrhoid. Normal anal tone.       Musculoskeletal:         General: No swelling. Normal range of motion.      Cervical back: No rigidity or tenderness.   Lymphadenopathy:      Cervical: No cervical adenopathy.      Upper Body:      Right upper body: No axillary adenopathy.      Left upper body: No axillary adenopathy.      Lower Body: No right inguinal adenopathy. No left inguinal  adenopathy.   Skin:     General: Skin is warm and dry.   Neurological:      Mental Status: She is alert.      Gait: Gait normal.   Psychiatric:         Mood and Affect: Mood normal.         Behavior: Behavior normal.         Judgment: Judgment normal.         Result Review   The following data was reviewed by Fina Bardales MD on 04/03/2023.  Lab Results   Component Value Date    WBC 8.22 02/14/2023    HGB 13.9 02/14/2023    HCT 39.9 02/14/2023    MCV 85.4 02/14/2023     02/14/2023     Lab Results   Component Value Date    GLUCOSE 73 02/14/2023    BUN 9 02/14/2023    CREATININE 0.72 02/14/2023    EGFR 98.3 02/14/2023    BCR 12.5 02/14/2023    K 4.2 02/14/2023    CO2 28.0 02/14/2023    CALCIUM 9.5 02/14/2023    ALBUMIN 4.7 02/14/2023    BILITOT 0.5 02/14/2023    AST 21 02/14/2023    ALT 15 02/14/2023     Lab Results   Component Value Date    CHOL 139 06/10/2022    CHLPL 169 04/01/2021    TRIG 85 06/10/2022    HDL 83 (H) 06/10/2022    LDL 40 06/10/2022     No results found for: TSH  No results found for: HGBA1C  No results found for: PSA               Assessment and Plan:       Diagnoses and all orders for this visit:    1. Annual physical exam (Primary)  Comments:  Pap performed.  Breast exam within normal limits.  Mammogram ordered for July 2024, colonoscopy up-to-date.  Recommend Shingrix and COVID booster  Orders:  -     POCT urinalysis dipstick, automated    2. Urinary tract infection without hematuria, site unspecified  Comments:  We will recheck a urine today to reeval  Orders:  -     Urinalysis With Culture If Indicated - Urine, Clean Catch    3. Encounter for screening mammogram for breast cancer  Comments:  Mammogram ordered  Orders:  -     Mammo Screening Digital Tomosynthesis Bilateral With CAD; Future    4. Encounter for immunization  Comments:  Recommend COVID booster and Shingrix vaccine.  She will get Shingrix at the pharmacy and defers COVID booster today.  Tdap up-to-date, recommend  yearly flu vacs    5. Colon cancer screening  Comments:  Colonoscopy is up-to-date.  POC stool was normal  Orders:  -     POC Occult Blood Stool    6. Postmenopausal state  Comments:  Recommend DEXA every 2 to 3 years.  DEXA is up-to-date    7. Current moderate episode of major depressive disorder without prior episode  Comments:  Stable on Lexapro and as needed BuSpar.  No changes needed current meds or treatment plan.  No SI/HI    8. Fibromyalgia  Comments:  Overall stable.  No changes needed in current meds or treatment plan    9. Gastroesophageal reflux disease without esophagitis  Comments:  Stable on Nexium.  No changes needed current meds or treatment plan          Follow Up   Return in about 6 months (around 10/3/2023) for Recheck.           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   • Other atopic dermatitis   • Other pruritus   • Other specified anxiety disorders   • Overweight   • Primary generalized hypertrophic osteoarthrosis   • Tinnitus, bilateral   • Vitamin D deficiency, unspecified     Past Surgical History:   • HYSTERECTOMY    ABN PAP   • MOUTH SURGERY   • OVARIAN CYST SURGERY      Family History   Problem Relation Age of Onset   • Alcohol abuse Mother    • Heart disease Mother    • Hypertension Mother    • Stroke Father    • Alcohol abuse Father    • Diabetes Father    • Hyperlipidemia Father    • Heart attack Maternal Grandmother    • Cancer Maternal Grandfather    • Stroke Paternal Grandmother    • Cancer Paternal Grandfather      Social History     Tobacco Use   • Smoking status: Never   • Smokeless tobacco: Never   Substance Use Topics   • Alcohol use: Not Currently       There are  no preventive care reminders to display for this patient.     Immunization History   Administered Date(s) Administered   • COVID-19 (MODERNA) 1st, 2nd, 3rd Dose Only 02/12/2021, 03/12/2021   • COVID-19 (MODERNA) BOOSTER 12/10/2021, 04/12/2022   • Flu Vaccine Intradermal Quad 18-64YR 09/15/2020   • FluLaval/Fluzone >6mos 10/27/2022   • Influenza, Unspecified 09/17/2020, 10/27/2022   • Tdap 06/02/2022       Allergies   Allergen Reactions   • Carafate [Sucralfate] Other (See Comments)     Tightness around her throat, dry mouth

## 2023-04-05 ENCOUNTER — TELEPHONE (OUTPATIENT)
Dept: GASTROENTEROLOGY | Facility: CLINIC | Age: 58
End: 2023-04-05
Payer: COMMERCIAL

## 2023-04-05 ENCOUNTER — ANESTHESIA EVENT (OUTPATIENT)
Dept: GASTROENTEROLOGY | Facility: HOSPITAL | Age: 58
End: 2023-04-05
Payer: COMMERCIAL

## 2023-04-05 ENCOUNTER — HOSPITAL ENCOUNTER (OUTPATIENT)
Facility: HOSPITAL | Age: 58
Setting detail: HOSPITAL OUTPATIENT SURGERY
Discharge: HOME | End: 2023-04-05
Attending: INTERNAL MEDICINE | Admitting: INTERNAL MEDICINE
Payer: COMMERCIAL

## 2023-04-05 ENCOUNTER — ANESTHESIA (OUTPATIENT)
Dept: GASTROENTEROLOGY | Facility: HOSPITAL | Age: 58
End: 2023-04-05
Payer: COMMERCIAL

## 2023-04-05 VITALS
HEART RATE: 80 BPM | TEMPERATURE: 97.8 F | DIASTOLIC BLOOD PRESSURE: 62 MMHG | BODY MASS INDEX: 29.15 KG/M2 | WEIGHT: 172.4 LBS | RESPIRATION RATE: 18 BRPM | SYSTOLIC BLOOD PRESSURE: 117 MMHG | OXYGEN SATURATION: 98 %

## 2023-04-05 DIAGNOSIS — R15.2 FECAL URGENCY: ICD-10-CM

## 2023-04-05 DIAGNOSIS — K62.5 RECTAL BLEEDING: ICD-10-CM

## 2023-04-05 DIAGNOSIS — R12 HEARTBURN: ICD-10-CM

## 2023-04-05 DIAGNOSIS — Z86.010 HISTORY OF COLON POLYPS: ICD-10-CM

## 2023-04-05 DIAGNOSIS — R13.13 PHARYNGEAL DYSPHAGIA: ICD-10-CM

## 2023-04-05 DIAGNOSIS — R11.0 NAUSEA: ICD-10-CM

## 2023-04-05 PROCEDURE — 45378 DIAGNOSTIC COLONOSCOPY: CPT | Performed by: INTERNAL MEDICINE

## 2023-04-05 PROCEDURE — 88305 TISSUE EXAM BY PATHOLOGIST: CPT | Performed by: INTERNAL MEDICINE

## 2023-04-05 PROCEDURE — 43239 EGD BIOPSY SINGLE/MULTIPLE: CPT | Performed by: INTERNAL MEDICINE

## 2023-04-05 PROCEDURE — 25010000002 ONDANSETRON PER 1 MG: Performed by: ANESTHESIOLOGY

## 2023-04-05 PROCEDURE — 25010000002 PROPOFOL 10 MG/ML EMULSION

## 2023-04-05 RX ORDER — ONDANSETRON 2 MG/ML
4 INJECTION INTRAMUSCULAR; INTRAVENOUS ONCE
Status: COMPLETED | OUTPATIENT
Start: 2023-04-05 | End: 2023-04-05

## 2023-04-05 RX ORDER — PROPOFOL 10 MG/ML
VIAL (ML) INTRAVENOUS AS NEEDED
Status: DISCONTINUED | OUTPATIENT
Start: 2023-04-05 | End: 2023-04-05

## 2023-04-05 RX ORDER — PROPOFOL 10 MG/ML
VIAL (ML) INTRAVENOUS AS NEEDED
Status: DISCONTINUED | OUTPATIENT
Start: 2023-04-05 | End: 2023-04-05 | Stop reason: SURG

## 2023-04-05 RX ORDER — HYDROCORTISONE 25 MG/G
CREAM TOPICAL 2 TIMES DAILY
Qty: 28 G | Refills: 0 | Status: SHIPPED | OUTPATIENT
Start: 2023-04-05 | End: 2023-04-19

## 2023-04-05 RX ORDER — SODIUM CHLORIDE, SODIUM LACTATE, POTASSIUM CHLORIDE, CALCIUM CHLORIDE 600; 310; 30; 20 MG/100ML; MG/100ML; MG/100ML; MG/100ML
30 INJECTION, SOLUTION INTRAVENOUS CONTINUOUS
Status: DISCONTINUED | OUTPATIENT
Start: 2023-04-05 | End: 2023-04-05 | Stop reason: HOSPADM

## 2023-04-05 RX ORDER — LIDOCAINE HYDROCHLORIDE 20 MG/ML
INJECTION, SOLUTION EPIDURAL; INFILTRATION; INTRACAUDAL; PERINEURAL AS NEEDED
Status: DISCONTINUED | OUTPATIENT
Start: 2023-04-05 | End: 2023-04-05 | Stop reason: SURG

## 2023-04-05 RX ADMIN — PROPOFOL 30 MG: 10 INJECTION, EMULSION INTRAVENOUS at 08:59

## 2023-04-05 RX ADMIN — SODIUM CHLORIDE, POTASSIUM CHLORIDE, SODIUM LACTATE AND CALCIUM CHLORIDE 30 ML/HR: 600; 310; 30; 20 INJECTION, SOLUTION INTRAVENOUS at 07:39

## 2023-04-05 RX ADMIN — PROPOFOL 100 MG: 10 INJECTION, EMULSION INTRAVENOUS at 08:44

## 2023-04-05 RX ADMIN — LIDOCAINE HYDROCHLORIDE 80 MG: 20 INJECTION, SOLUTION EPIDURAL; INFILTRATION; INTRACAUDAL; PERINEURAL at 08:44

## 2023-04-05 RX ADMIN — ONDANSETRON 4 MG: 2 INJECTION INTRAMUSCULAR; INTRAVENOUS at 07:56

## 2023-04-05 RX ADMIN — PROPOFOL 200 MCG/KG/MIN: 10 INJECTION, EMULSION INTRAVENOUS at 08:44

## 2023-04-05 NOTE — ANESTHESIA PREPROCEDURE EVALUATION
Anesthesia Evaluation     Patient summary reviewed and Nursing notes reviewed   no history of anesthetic complications:  NPO Solid Status: > 8 hours  NPO Liquid Status: > 2 hours           Airway   Mallampati: I  TM distance: >3 FB  Neck ROM: full  No difficulty expected and Small opening  Dental      Pulmonary - negative pulmonary ROS and normal exam    breath sounds clear to auscultation  Cardiovascular - negative cardio ROS and normal exam  Exercise tolerance: good (4-7 METS)    Rhythm: regular  Rate: normal        Neuro/Psych  (+) headaches, psychiatric history,    GI/Hepatic/Renal/Endo    (+)  GI bleeding ,     Musculoskeletal     (+) back pain,   Abdominal    Substance History - negative use     OB/GYN negative ob/gyn ROS         Other   arthritis,      ROS/Med Hx Other: PAT Nursing Notes unavailable.     Pt with fibromyalgia                  Anesthesia Plan    ASA 3     general     (Total IV Anesthesia    Patient understands anesthesia not responsible for dental damage.  )  intravenous induction     Anesthetic plan, risks, benefits, and alternatives have been provided, discussed and informed consent has been obtained with: patient.    Plan discussed with CRNA.        CODE STATUS:

## 2023-04-05 NOTE — H&P
Pre Procedure History & Physical    Chief Complaint:   Nausea, heartburn, dysphagia, rectal bleeding    Subjective     HPI:   58 yo F here for eval of nausea, heartburn, dysphagia, rectal bleeding.    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    • Other atopic dermatitis    • Other pruritus    • Other specified anxiety disorders    • Overweight    • Primary generalized hypertrophic osteoarthrosis    • Tinnitus, bilateral    • Vitamin D deficiency, unspecified        Past Surgical History:  Past Surgical History:   Procedure Laterality Date   • HYSTERECTOMY      ABN PAP   • MOUTH SURGERY     • OVARIAN CYST SURGERY         Family History:  Family History   Problem Relation Age of Onset   • Alcohol abuse Mother    • Heart disease Mother    • Hypertension Mother    • Stroke Father    • Alcohol abuse Father    • Diabetes Father    • Hyperlipidemia Father    • Heart attack Maternal Grandmother    • Cancer Maternal Grandfather    • Stroke Paternal Grandmother    • Cancer Paternal Grandfather        Social History:   reports that she has never smoked. She has never used smokeless tobacco. She reports that she does not currently use alcohol. She reports that she does not use drugs.    Medications:   Medications Prior to Admission   Medication Sig Dispense Refill Last Dose   • alclometasone (ACLOVATE) 0.05 % cream Apply  topically to the appropriate area as directed See Admin Instructions. apply topically to the appropriate area as directed 2 times a day 45 g 1 Past Week   • busPIRone (BUSPAR) 10 MG tablet Take 0.5 tablets by mouth 2 (Two) Times a Day. 180 tablet 1 4/4/2023   • CBD  (cannabidiol) oral oil Take  by mouth.   Past Month   • escitalopram (LEXAPRO) 20 MG tablet Take 1 tablet by mouth Daily. 90 tablet 1 4/4/2023   • esomeprazole (nexIUM) 40 MG capsule Take 1 capsule by mouth 2 (Two) Times a Day As Needed (GERD). 180 capsule 1 Past Week   • famotidine (PEPCID) 40 MG tablet Take 1 tablet by mouth Daily As Needed for Indigestion or Heartburn. 90 tablet 1 4/4/2023   • Flaxseed, Linseed, (FLAX SEED OIL PO) Take  by mouth.   Past Month   • Misc Natural Products (BLACK CHERRY CONCENTRATE PO) Take  by mouth.   Past Month   • Multiple Vitamins-Minerals (EMERGEN-C IMMUNE PO) Take  by mouth.   Past Week   • cholecalciferol (VITAMIN D3) 25 MCG (1000 UT) tablet Take 1 tablet by mouth Daily.   More than a month   • Zinc 50 MG capsule Take  by mouth.   More than a month       Allergies:  Carafate [sucralfate]    ROS:    Pertinent items are noted in HPI     Objective     Blood pressure 116/50, pulse 71, temperature 97.8 °F (36.6 °C), temperature source Temporal, resp. rate 16, weight 78.2 kg (172 lb 6.4 oz), SpO2 98 %.    Physical Exam   Constitutional: Pt is oriented to person, place, and time and well-developed, well-nourished, and in no distress.   Mouth/Throat: Oropharynx is clear and moist.   Neck: Normal range of motion.   Cardiovascular: Normal rate, regular rhythm and normal heart sounds.    Pulmonary/Chest: Effort normal and breath sounds normal.   Abdominal: Soft. Nontender  Skin: Skin is warm and dry.   Psychiatric: Mood, memory, affect and judgment normal.     Assessment & Plan     Diagnosis:  Nausea, heartburn, dysphagia, rectal bleeding    Anticipated Surgical Procedure:  EGD/colonoscopy    The risks, benefits, and alternatives of this procedure have been discussed with the patient or the responsible party- the patient understands and agrees to proceed.

## 2023-04-05 NOTE — ANESTHESIA POSTPROCEDURE EVALUATION
Patient: Vanesa Esquivel    Procedure Summary     Date: 04/05/23 Room / Location: [facility] ENDOSCOPY 4 / [facility] ENDOSCOPY    Anesthesia Start: 0842 Anesthesia Stop: 0912    Procedures:       ESOPHAGOGASTRODUODENOSCOPY WITH BIOPSIES      COLONOSCOPY Diagnosis:       Nausea      Heartburn      Pharyngeal dysphagia      Fecal urgency      History of colon polyps      Rectal bleeding      (Nausea [R11.0])      (Heartburn [R12])      (Pharyngeal dysphagia [R13.13])      (Fecal urgency [R15.2])      (History of colon polyps [Z86.010])      (Rectal bleeding [K62.5])    Surgeons: Melanie Thomas MD Provider: Harshal Barahona MD    Anesthesia Type: general ASA Status: 3          Anesthesia Type: general    Vitals  No vitals data found for the desired time range.          Post Anesthesia Care and Evaluation    Patient location during evaluation: bedside  Patient participation: complete - patient participated  Level of consciousness: awake  Pain management: adequate    Airway patency: patent  PONV Status: none  Cardiovascular status: acceptable and stable  Respiratory status: acceptable  Hydration status: acceptable    Comments: An Anesthesiologist personally participated in the most demanding procedures (including induction and emergence if applicable) in the anesthesia plan, monitored the course of anesthesia administration at frequent intervals and remained physically present and available for immediate diagnosis and treatment of emergencies.

## 2023-04-06 LAB
CYTO UR: NORMAL
LAB AP CASE REPORT: NORMAL
LAB AP CLINICAL INFORMATION: NORMAL
PATH REPORT.FINAL DX SPEC: NORMAL
PATH REPORT.GROSS SPEC: NORMAL

## 2023-04-10 ENCOUNTER — TELEPHONE (OUTPATIENT)
Dept: GASTROENTEROLOGY | Facility: CLINIC | Age: 58
End: 2023-04-10
Payer: COMMERCIAL

## 2023-04-10 NOTE — TELEPHONE ENCOUNTER
Spoke to pt and informed of Niya Perales APRN result note and recommendations. F/u apt scheduled on 5/8/2023. Pt verified understanding.

## 2023-04-10 NOTE — TELEPHONE ENCOUNTER
----- Message from XIAO Boyd sent at 4/7/2023 12:37 PM EDT -----  Biopsies are consistent with gastritis.  Recommend to avoid NSAIDs and continue PPI.  Schedule for follow-up.

## 2023-04-14 ENCOUNTER — HOSPITAL ENCOUNTER (OUTPATIENT)
Dept: SLEEP MEDICINE | Facility: HOSPITAL | Age: 58
Discharge: HOME | End: 2023-04-14
Admitting: INTERNAL MEDICINE
Payer: COMMERCIAL

## 2023-04-14 DIAGNOSIS — F51.04 PSYCHOPHYSIOLOGICAL INSOMNIA: ICD-10-CM

## 2023-04-14 DIAGNOSIS — G47.30 OBSERVED SLEEP APNEA: ICD-10-CM

## 2023-04-14 DIAGNOSIS — G47.10 HYPERSOMNIA: ICD-10-CM

## 2023-04-14 DIAGNOSIS — G47.8 NON-RESTORATIVE SLEEP: ICD-10-CM

## 2023-04-14 DIAGNOSIS — R51.9 MORNING HEADACHE: ICD-10-CM

## 2023-04-14 DIAGNOSIS — R06.83 SNORING: ICD-10-CM

## 2023-04-14 PROCEDURE — 95806 SLEEP STUDY UNATT&RESP EFFT: CPT

## 2023-04-19 DIAGNOSIS — G47.33 OSA (OBSTRUCTIVE SLEEP APNEA): Primary | ICD-10-CM

## 2023-04-19 DIAGNOSIS — R06.83 SNORING: ICD-10-CM

## 2023-04-20 ENCOUNTER — TELEPHONE (OUTPATIENT)
Dept: SLEEP MEDICINE | Facility: HOSPITAL | Age: 58
End: 2023-04-20
Payer: COMMERCIAL

## 2023-05-08 ENCOUNTER — OFFICE VISIT (OUTPATIENT)
Dept: GASTROENTEROLOGY | Facility: CLINIC | Age: 58
End: 2023-05-08
Payer: COMMERCIAL

## 2023-05-08 VITALS
BODY MASS INDEX: 29.4 KG/M2 | HEIGHT: 64 IN | WEIGHT: 172.2 LBS | SYSTOLIC BLOOD PRESSURE: 99 MMHG | OXYGEN SATURATION: 98 % | DIASTOLIC BLOOD PRESSURE: 65 MMHG | HEART RATE: 86 BPM

## 2023-05-08 DIAGNOSIS — R13.10 DYSPHAGIA, UNSPECIFIED TYPE: ICD-10-CM

## 2023-05-08 DIAGNOSIS — Z86.010 HISTORY OF COLON POLYPS: ICD-10-CM

## 2023-05-08 DIAGNOSIS — K21.00 GASTROESOPHAGEAL REFLUX DISEASE WITH ESOPHAGITIS WITHOUT HEMORRHAGE: Primary | ICD-10-CM

## 2023-05-08 DIAGNOSIS — K21.00 GASTROESOPHAGEAL REFLUX DISEASE WITH ESOPHAGITIS WITHOUT HEMORRHAGE: ICD-10-CM

## 2023-05-08 DIAGNOSIS — Z87.19 HISTORY OF GASTRITIS: ICD-10-CM

## 2023-05-08 PROCEDURE — 99214 OFFICE O/P EST MOD 30 MIN: CPT | Performed by: NURSE PRACTITIONER

## 2023-05-08 RX ORDER — ESOMEPRAZOLE MAGNESIUM 40 MG/1
40 CAPSULE, DELAYED RELEASE ORAL
Qty: 60 CAPSULE | Refills: 0 | Status: SHIPPED | OUTPATIENT
Start: 2023-05-08

## 2023-05-08 RX ORDER — ESOMEPRAZOLE MAGNESIUM 40 MG/1
CAPSULE, DELAYED RELEASE ORAL
Qty: 180 CAPSULE | OUTPATIENT
Start: 2023-05-08

## 2023-05-08 RX ORDER — FAMOTIDINE 40 MG/1
40 TABLET, FILM COATED ORAL 2 TIMES DAILY
Qty: 60 TABLET | Refills: 0 | Status: SHIPPED | OUTPATIENT
Start: 2023-05-08

## 2023-05-08 RX ORDER — FAMOTIDINE 40 MG/1
TABLET, FILM COATED ORAL
Qty: 180 TABLET | OUTPATIENT
Start: 2023-05-08

## 2023-05-08 NOTE — PATIENT INSTRUCTIONS
Start nexium 40 mg twice daily x 2 weeks  Start pepcid 40 mg twice daily x 2 weeks    Continue high fiber diet    Give me update in 2 weeks

## 2023-05-08 NOTE — PROGRESS NOTES
Chief Complaint   Follow-up, Procedure (EGD FOLLOW UP ), and EGD (/)    History of Present Illness       Vanesa Esquivel is a 57 y.o. female who presents to [facility] for follow-up For GERD.  She is new to me today.  She was last seen in the office by nurse practitioner Roger Menard on 7/27/2022.        Underwent EGD and colonoscopy with Dr. Thomas on 4/5/2023.  EGD showed gastritis.  Colonoscopy showed internal and external hemorrhoids otherwise normal exam.  Path positive for mild chronic inactive gastritis and erosions.  Reflux esophagitis.  Repeat colonoscopy in 5 years for surveillance.    Has history of GERD/gastritis/esophagitis and is on Nexium 40 mg every night and Pepcid 40 mg every morning as needed.Still having breakthrough reflux and will have trouble swallowing. Worse with pepper.     Will have loose and soft stools with fecal urgency. Has had fecal incontinence.  Normal abd US and HIDA scan last fall.  Feels like she has a nervous stomach and admits that she has been trying to wean herself off the BuSpar.  She reports it does help so she is wondering if maybe she should not get back on it.    GI family history--maternal grandfather with liver cancer. Paternal grandfather ?liver cancer.       Results       Result Review :       CMP        6/10/2022    08:50 2/14/2023    16:47   CMP   Glucose 97   73     BUN 9   9     Creatinine 0.74   0.72     EGFR 95.7   98.3     Sodium 138   141     Potassium 4.5   4.2     Chloride 104   104     Calcium 9.4   9.5     Total Protein 6.6   7.4     Albumin 4.20   4.7     Globulin 2.4   2.7     Total Bilirubin 0.4   0.5     Alkaline Phosphatase 112   124     AST (SGOT) 17   21     ALT (SGPT) 15   15     Albumin/Globulin Ratio 1.8   1.7     BUN/Creatinine Ratio 12.2   12.5     Anion Gap 9.1   9.0       CBC        6/10/2022    08:50 2/14/2023    16:47   CBC   WBC 5.71   8.22     RBC 4.42   4.67     Hemoglobin 12.6   13.9     Hematocrit 37.4    39.9     MCV 84.6   85.4     MCH 28.5   29.8     MCHC 33.7   34.8     RDW 12.8   13.0     Platelets 275   366             Past Medical History       Past Medical History:   Diagnosis Date   • Acute atopic conjunctivitis, bilateral    • Acute pharyngitis, unspecified    • Chest pain on breathing    • Fibromyalgia    • Gastro-esophageal reflux disease without esophagitis    • Low back pain    • Major depressive disorder, single episode, mild    • 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    • Other atopic dermatitis    • Other pruritus    • Other specified anxiety disorders    • Overweight    • Primary generalized hypertrophic osteoarthrosis    • Tinnitus, bilateral    • Vitamin D deficiency, unspecified        Past Surgical History:   Procedure Laterality Date   • COLONOSCOPY N/A 4/5/2023    Procedure: COLONOSCOPY;  Surgeon: Melanie Thomas MD;  Location: [facility] ENDOSCOPY;  Service: Gastroenterology;  Laterality: N/A;  HEMORRHOIDS   • ENDOSCOPY N/A 4/5/2023    Procedure: ESOPHAGOGASTRODUODENOSCOPY WITH BIOPSIES;  Surgeon: Melanie Thomas MD;  Location: [facility] ENDOSCOPY;  Service: Gastroenterology;  Laterality: N/A;  GASTRITIS   • HYSTERECTOMY      ABN PAP   • MOUTH SURGERY     • OVARIAN CYST SURGERY           Current Outpatient Medications:   •  alclometasone (ACLOVATE) 0.05 % cream, Apply  topically to the appropriate area as directed See Admin Instructions. apply topically to the appropriate area as directed 2 times a day, Disp: 45 g, Rfl: 1  •  busPIRone (BUSPAR) 10 MG tablet, Take 0.5 tablets by mouth 2 (Two) Times a Day., Disp: 180 tablet, Rfl: 1  •  CBD (cannabidiol) oral oil, Take  by mouth., Disp: , Rfl:   •  escitalopram (LEXAPRO) 20 MG tablet, Take 1 tablet by mouth Daily., Disp: 90 tablet, Rfl: 1  •  esomeprazole (nexIUM) 40 MG capsule, Take 1 capsule by mouth 2 (Two) Times a Day Before Meals., Disp: 60 capsule, Rfl: 0  •  famotidine (PEPCID) 40 MG tablet, Take 1  "tablet by mouth 2 (Two) Times a Day., Disp: 60 tablet, Rfl: 0  •  Hydrocortisone Ace-Pramoxine (hydrocortisone-pramoxine) 1-1 % foam, INSERT RECTALLY TO THE AFFECTED AREA TWICE DAILY FOR 14 DAYS, Disp: , Rfl:   •  Multiple Vitamins-Minerals (EMERGEN-C IMMUNE PO), Take  by mouth., Disp: , Rfl:      Allergies   Allergen Reactions   • Carafate [Sucralfate] Other (See Comments)     Tightness around her throat, dry mouth       Family History   Problem Relation Age of Onset   • Alcohol abuse Mother    • Heart disease Mother    • Hypertension Mother    • Stroke Father    • Alcohol abuse Father    • Diabetes Father    • Hyperlipidemia Father    • Heart attack Maternal Grandmother    • Cancer Maternal Grandfather    • Stroke Paternal Grandmother    • Cancer Paternal Grandfather         Social History     Social History Narrative   • Not on file       Objective       Review of Systems   Constitutional: Negative for appetite change, fatigue, fever, unexpected weight gain and unexpected weight loss.   HENT: Positive for trouble swallowing.    Respiratory: Negative for cough, choking, chest tightness, shortness of breath, wheezing and stridor.    Cardiovascular: Negative for chest pain, palpitations and leg swelling.   Gastrointestinal: Positive for abdominal distention, diarrhea, GERD and indigestion. Negative for abdominal pain, anal bleeding, blood in stool, constipation, nausea, rectal pain and vomiting.        Vital Signs:   BP 99/65   Pulse 86   Ht 163.8 cm (64.49\")   Wt 78.1 kg (172 lb 3.2 oz)   SpO2 98%   BMI 29.11 kg/m²       Physical Exam  Constitutional:       General: She is not in acute distress.     Appearance: She is well-developed. She is not ill-appearing.   HENT:      Head: Normocephalic.   Eyes:      Pupils: Pupils are equal, round, and reactive to light.   Cardiovascular:      Rate and Rhythm: Normal rate and regular rhythm.      Heart sounds: Normal heart sounds.   Pulmonary:      Effort: Pulmonary " effort is normal.      Breath sounds: Normal breath sounds.   Abdominal:      General: Bowel sounds are normal. There is no distension.      Palpations: Abdomen is soft. There is no mass.      Tenderness: There is no abdominal tenderness. There is no guarding or rebound.      Hernia: No hernia is present.   Musculoskeletal:         General: Normal range of motion.   Skin:     General: Skin is warm and dry.   Neurological:      Mental Status: She is alert and oriented to person, place, and time.   Psychiatric:         Speech: Speech normal.         Behavior: Behavior normal.         Judgment: Judgment normal.           Assessment & Plan          Assessment and Plan    Diagnoses and all orders for this visit:    1. Gastroesophageal reflux disease with esophagitis without hemorrhage (Primary)  -     esomeprazole (nexIUM) 40 MG capsule; Take 1 capsule by mouth 2 (Two) Times a Day Before Meals.  Dispense: 60 capsule; Refill: 0  -     famotidine (PEPCID) 40 MG tablet; Take 1 tablet by mouth 2 (Two) Times a Day.  Dispense: 60 tablet; Refill: 0    2. History of gastritis    3. Dysphagia, unspecified type    4. History of colon polyps    Reviewed EGD and colonoscopy results with her today.  GERD is not well controlled currently.  We will increase Nexium to 40 mg twice a day and Pepcid 40 mg twice a day.  Continue GERD precautions.  Would like her to take this regimen for the next 2 weeks and see how she does.  May end up changing Nexium altogether to something stronger.  Patient to continue to work on her diet.  Bowels are loose and patient does feel like this is anxiety related.  Did review normal HIDA scan and abdominal ultrasound results with her today.  At this time would like her to restart her BuSpar.  Recommend a high-fiber diet.  Patient to call the office in 2 weeks with an update.  Patient to make follow-up with me in 2 months.  Next screening colonoscopy due in 2028.  Patient is agreeable to the  plan.            Follow Up       Follow Up   Return in about 2 months (around 7/8/2023) for GERD.  Patient was given instructions and counseling regarding her condition or for health maintenance advice. Please see specific information pulled into the AVS if appropriate.

## 2023-05-12 ENCOUNTER — PATIENT ROUNDING (BHMG ONLY) (OUTPATIENT)
Dept: GASTROENTEROLOGY | Facility: CLINIC | Age: 58
End: 2023-05-12
Payer: COMMERCIAL

## 2023-05-12 NOTE — PROGRESS NOTES
5/12/2023      Hello, may I speak with Vanesa Esquivel     My name is Ann, I am the Clinical Coordinator. I am calling from [facility]. I show that you had a recent visit with XIAO Saucedo.    Before we get started may I verify your date of birth? 1965    I am calling to ask about your recent visit. Is this a good time to talk? Yes    Tell me about your visit with us. What things went well? Patient states that she loved the visit and she was a very nice lady.         We're always looking for ways to make our patients' experiences even better. Do you have recommendations on ways we may improve? no    Overall were you satisfied with your visit to our practice? Yes    I appreciate you taking the time to speak with me today. Is there anything else I can do for you? No    I am glad to hear that you had a very good visit and I appreciate you taking the time to provide feedback on this call. We would greatly appreciate you filling out a survey if you receive one in the mail, email or text. This is a great opportunity to provide any additional feedback that you may think of after this call as well.       Thank you, and have a great day.

## 2023-05-30 ENCOUNTER — TELEPHONE (OUTPATIENT)
Dept: GASTROENTEROLOGY | Facility: CLINIC | Age: 58
End: 2023-05-30

## 2023-06-05 ENCOUNTER — TELEPHONE (OUTPATIENT)
Dept: SLEEP MEDICINE | Facility: HOSPITAL | Age: 58
End: 2023-06-05
Payer: COMMERCIAL

## 2023-06-05 DIAGNOSIS — F32.0 MAJOR DEPRESSIVE DISORDER, SINGLE EPISODE, MILD: ICD-10-CM

## 2023-06-05 DIAGNOSIS — M79.7 FIBROMYALGIA: ICD-10-CM

## 2023-06-05 RX ORDER — ESCITALOPRAM OXALATE 20 MG/1
20 TABLET ORAL DAILY
Qty: 90 TABLET | Refills: 1 | Status: SHIPPED | OUTPATIENT
Start: 2023-06-05

## 2023-06-05 NOTE — TELEPHONE ENCOUNTER
Patient stated that she has tried Carafate along with her reflux medicine and that she had a bad reaction to it. Patient reported that it made her feel awful.

## 2023-06-05 NOTE — TELEPHONE ENCOUNTER
Pt called today to question her number of events per hour.  Printed a download and took to Dr. Wong to see.  He is not concerned about the AHI being 7.7 as the pt has not even had her machine for a full month yet and it can take 6-8 weeks for the averages to level out.    Call to pt to let her know.  She will see Dr on 6/14 and if she is still having issues, they will address it again at that point.

## 2023-06-06 ENCOUNTER — OFFICE VISIT (OUTPATIENT)
Dept: NEUROLOGY | Facility: CLINIC | Age: 58
End: 2023-06-06
Payer: COMMERCIAL

## 2023-06-06 VITALS
HEIGHT: 64 IN | HEART RATE: 73 BPM | SYSTOLIC BLOOD PRESSURE: 109 MMHG | BODY MASS INDEX: 29.78 KG/M2 | DIASTOLIC BLOOD PRESSURE: 63 MMHG | WEIGHT: 174.4 LBS

## 2023-06-06 DIAGNOSIS — G47.30 OBSERVED SLEEP APNEA: ICD-10-CM

## 2023-06-06 DIAGNOSIS — R41.3 MEMORY LOSS: Primary | ICD-10-CM

## 2023-06-06 RX ORDER — DEXLANSOPRAZOLE 60 MG/1
60 CAPSULE, DELAYED RELEASE ORAL DAILY
Qty: 90 CAPSULE | Refills: 0 | Status: SHIPPED | OUTPATIENT
Start: 2023-06-06

## 2023-06-06 RX ORDER — DEXLANSOPRAZOLE 60 MG/1
60 CAPSULE, DELAYED RELEASE ORAL DAILY
Qty: 30 CAPSULE | Refills: 0 | Status: SHIPPED | OUTPATIENT
Start: 2023-06-06 | End: 2023-06-06

## 2023-06-06 NOTE — PROGRESS NOTES
"Chief Complaint  Neurologic Problem (Last seen in Sept 2022)    Subjective          Vanesa Esquivel presents to [facility]  History of Present Illness  Diagnosed with NENA about 1 month ago, has been using CPAP.  Feels as if memory loss remains stable. Has yet to see improvement using CPAP.      Interval History:   States she's experiencing memory difficulty for 10+ years. Feels that about that time she may have had a \"nervous breakdown\", but was never diagnosed.    Endorses intermittent dizziness.  Endorses tinnitus.  States she had a normal hearing test. States she \"spaces out\".  With the dizziness can get a wave of nausea.  Endorses fatigue. Was diagnosed with fibromyalgia several years ago.  States she forgets why she went into a room. Will struggle to remember facts randomly.  States 2 weeks ago she was driving and had sudden onset of severe fatigue she had to stop and get orange juice.  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  States she doesn't sleep well at night.  Endorses broken sleep.  Endorses snoring.  States she does not tolerate trazodone due to increased drowsiness.     Objective   Vital Signs:   /63   Pulse 73   Ht 163.8 cm (64.49\")   Wt 79.1 kg (174 lb 6.4 oz)   BMI 29.48 kg/m²     Physical Exam  HENT:      Head: Normocephalic.   Pulmonary:      Effort: Pulmonary effort is normal.   Neurological:      Mental Status: She is alert and oriented to person, place, and time.      Sensory: Sensation is intact.      Motor: Motor function is intact.      Coordination: Coordination is intact.      Deep Tendon Reflexes: Reflexes are normal and symmetric.      Neurologic Exam     Mental Status   Oriented to person, place, and time.      Result Review :   CBC:  Lab Results   Component Value Date    WBC 8.22 02/14/2023    RBC 4.67 02/14/2023    HGB 13.9 02/14/2023    HCT 39.9 02/14/2023    MCV 85.4 02/14/2023    MCH 29.8 " 02/14/2023    MCHC 34.8 02/14/2023    RDW 13.0 02/14/2023     02/14/2023     CMP:  Lab Results   Component Value Date    BUN 9 02/14/2023    CREATININE 0.72 02/14/2023     02/14/2023    K 4.2 02/14/2023     02/14/2023    CALCIUM 9.5 02/14/2023    ALBUMIN 4.7 02/14/2023    BILITOT 0.5 02/14/2023    ALKPHOS 124 (H) 02/14/2023    AST 21 02/14/2023    ALT 15 02/14/2023     B12:   Lab Results   Component Value Date    EAIGLACS70 848 02/14/2023      FOLATE:   Lab Results   Component Value Date    FOLATE 12.40 02/14/2023            MRI Brain:   Normal      Assessment and Plan    Diagnoses and all orders for this visit:    1. Memory loss (Primary)  Assessment & Plan:  MRI brain and labs normal.  No neurologic origin of memory loss identified.      2. Observed sleep apnea  Assessment & Plan:  Continue with CPAP          Follow Up   Return if symptoms worsen or fail to improve.  Patient was given instructions and counseling regarding her condition or for health maintenance advice. Please see specific information pulled into the AVS if appropriate.

## 2023-06-14 ENCOUNTER — OFFICE VISIT (OUTPATIENT)
Dept: SLEEP MEDICINE | Facility: HOSPITAL | Age: 58
End: 2023-06-14
Payer: COMMERCIAL

## 2023-06-14 VITALS
HEIGHT: 64 IN | WEIGHT: 172.9 LBS | OXYGEN SATURATION: 96 % | BODY MASS INDEX: 29.52 KG/M2 | SYSTOLIC BLOOD PRESSURE: 116 MMHG | HEART RATE: 89 BPM | DIASTOLIC BLOOD PRESSURE: 50 MMHG

## 2023-06-14 DIAGNOSIS — G47.33 OSA ON CPAP: Primary | ICD-10-CM

## 2023-06-14 DIAGNOSIS — Z99.89 OSA ON CPAP: Primary | ICD-10-CM

## 2023-06-14 PROBLEM — R06.83 SNORING: Status: RESOLVED | Noted: 2022-09-22 | Resolved: 2023-06-14

## 2023-06-14 PROBLEM — G47.10 HYPERSOMNIA: Status: RESOLVED | Noted: 2022-12-12 | Resolved: 2023-06-14

## 2023-06-14 PROBLEM — F51.04 PSYCHOPHYSIOLOGICAL INSOMNIA: Status: RESOLVED | Noted: 2022-12-12 | Resolved: 2023-06-14

## 2023-06-14 PROBLEM — G47.8 NON-RESTORATIVE SLEEP: Status: RESOLVED | Noted: 2022-12-12 | Resolved: 2023-06-14

## 2023-06-14 PROCEDURE — G0463 HOSPITAL OUTPT CLINIC VISIT: HCPCS

## 2023-06-14 NOTE — PROGRESS NOTES
"  [address] [address]  [place]   [place] [address]  Phone: [phone]  Fax: [phone]      SLEEP CLINIC FOLLOW UP PROGRESS NOTE.    Vanesa Esquivel  [id number]   1965  57 y.o.  female      PCP: Fina Bardales MD      Date of visit: 6/14/2023    Chief Complaint   Patient presents with    Sleep Apnea       HPI:  This is a 57 y.o. years old patient is here for the management of obstructive sleep apnea.  Sleep apnea is mild in severity with a AHI of 6/hr..  Patient also had severe snoring with 532 episodes of snoring.  Patient is using positive airway pressure therapy with auto CPAP and the symptoms of sleep apnea have improved significantly on the therapy. Normally patient goes to bed at 12 midnight and wakes up at any AM .  The patient wakes up 2 time(s) during the night and has no problem going back to sleep.  Feels refreshed after waking up.     Medications and allergies are reviewed by me and documented in the encounter.     SOCIAL (habits pertaining to sleep medicine)  History tobacco use:No   History of alcohol use: 0 per week  Caffeine use: 2     REVIEW OF SYSTEMS:   Pertaining positive symptoms are:  [place] Sleepiness Scale :Total score: 8   GERD      PHYSICAL EXAMINATION:  CONSTITUTIONAL:  Vitals:    06/14/23 1500   BP: 116/50   Pulse: 89   SpO2: 96%   Weight: 78.4 kg (172 lb 14.4 oz)   Height: 163.8 cm (64.49\")    Body mass index is 29.23 kg/m².   NOSE: nasal passages are clear, No deformities noted   RESP SYSTEM: Not in any respiratory distress, no chest deformities noted,   CARDIOVASULAR: No edema noted  NEURO: Oriented x 3, gait normal,  Mood and affect appeared appropriate      Data reviewed:  The Smart card downloaded on 6/14/2023 has been reviewed independently by me for compliance and discussed the data with the patient.   Compliance; 100%  More than 4 hr use, 93%  Average use of the device 6 hours and 45 minutes per night  Mask type: Nasal cradle  Device: " [employer]Med  DME: [employer]      ASSESSMENT AND PLAN:  Obstructive sleep apnea ( G 47.33).  The symptoms of sleep apnea have improved with the device and the treatment.  Patient's compliance with the device is excellent for treatment of sleep apnea.  I have independently reviewed the smart card down load and discussed with the patient the download data and encouarged the patient to continue to use the device.The residual AHI is acceptable. The device is benefiting the patient and the device is medically necessary.  Without proper control of sleep apnea and good compliance there is a increased risk for hypertension, diabetes mellitus and nonrestorative sleep with hypersomnia which can increase risk for motor vehicle accidents.  Untreated sleep apnea is also a risk factor for development of atrial fibrillation, pulmonary hypertension, insulin resistance and stroke. The patient is also instructed to get the supplies from the DME company and and change them on a regular basis.  A prescription for supplies has been sent to the DME company.  I have also discussed the good sleep hygiene habits and adequate amount of sleep needed for good health.  Return in about 1 year (around 6/14/2024) for with smart card down load. . Patient's questions were answered.    6/14/2023  Vandana Wong MD  Sleep Medicine.  Medical Director,   [facility], [place] and [place] [facility].

## 2023-06-15 DIAGNOSIS — K21.00 GASTROESOPHAGEAL REFLUX DISEASE WITH ESOPHAGITIS WITHOUT HEMORRHAGE: ICD-10-CM

## 2023-06-15 RX ORDER — FAMOTIDINE 40 MG/1
40 TABLET, FILM COATED ORAL 2 TIMES DAILY
Qty: 60 TABLET | Refills: 0 | Status: SHIPPED | OUTPATIENT
Start: 2023-06-15

## 2023-06-15 NOTE — TELEPHONE ENCOUNTER
Pt is requesting pepcid. Order pended.   Pt is asking if she should take dexilant of a morning or of an evening?

## 2023-07-27 ENCOUNTER — TELEPHONE (OUTPATIENT)
Dept: FAMILY MEDICINE CLINIC | Age: 58
End: 2023-07-27
Payer: COMMERCIAL

## 2023-07-27 NOTE — TELEPHONE ENCOUNTER
2nd attempt left VM with scheduling number regarding overdue mammogram screening from 4/3/2023-- LL

## 2023-07-27 NOTE — TELEPHONE ENCOUNTER
----- Message from Hanna Santos RN sent at 4/10/2023  9:14 AM EDT -----      ----- Message -----  From: SYSTEM  Sent: 4/8/2023   1:23 AM EDT  To: [facility] [place] [facility] [place]

## 2023-07-31 ENCOUNTER — TELEPHONE (OUTPATIENT)
Dept: GASTROENTEROLOGY | Facility: CLINIC | Age: 58
End: 2023-07-31
Payer: COMMERCIAL

## 2023-08-17 RX ORDER — DEXLANSOPRAZOLE 60 MG/1
60 CAPSULE, DELAYED RELEASE ORAL DAILY
Qty: 90 CAPSULE | Refills: 0 | Status: SHIPPED | OUTPATIENT
Start: 2023-08-17

## 2023-08-17 NOTE — TELEPHONE ENCOUNTER
Patient requesting a refill of Dexilant, order pended.  Last o/v-5/8/23  Last refill-6/6/23  Next o/v-8/21/23

## 2023-08-21 ENCOUNTER — LAB (OUTPATIENT)
Dept: LAB | Facility: HOSPITAL | Age: 58
End: 2023-08-21
Payer: COMMERCIAL

## 2023-08-21 ENCOUNTER — OFFICE VISIT (OUTPATIENT)
Dept: GASTROENTEROLOGY | Facility: CLINIC | Age: 58
End: 2023-08-21
Payer: COMMERCIAL

## 2023-08-21 VITALS
HEIGHT: 63 IN | DIASTOLIC BLOOD PRESSURE: 59 MMHG | BODY MASS INDEX: 30.72 KG/M2 | WEIGHT: 173.4 LBS | HEART RATE: 83 BPM | SYSTOLIC BLOOD PRESSURE: 123 MMHG

## 2023-08-21 DIAGNOSIS — R13.10 DYSPHAGIA, UNSPECIFIED TYPE: ICD-10-CM

## 2023-08-21 DIAGNOSIS — Z86.010 HISTORY OF COLON POLYPS: ICD-10-CM

## 2023-08-21 DIAGNOSIS — K21.00 GASTROESOPHAGEAL REFLUX DISEASE WITH ESOPHAGITIS WITHOUT HEMORRHAGE: ICD-10-CM

## 2023-08-21 DIAGNOSIS — K21.00 GASTROESOPHAGEAL REFLUX DISEASE WITH ESOPHAGITIS WITHOUT HEMORRHAGE: Primary | ICD-10-CM

## 2023-08-21 PROCEDURE — 86003 ALLG SPEC IGE CRUDE XTRC EA: CPT

## 2023-08-21 PROCEDURE — 36415 COLL VENOUS BLD VENIPUNCTURE: CPT

## 2023-08-21 PROCEDURE — 82784 ASSAY IGA/IGD/IGG/IGM EACH: CPT

## 2023-08-21 PROCEDURE — 86258 DGP ANTIBODY EACH IG CLASS: CPT

## 2023-08-21 PROCEDURE — 86008 ALLG SPEC IGE RECOMB EA: CPT

## 2023-08-21 PROCEDURE — 86364 TISS TRNSGLTMNASE EA IG CLAS: CPT

## 2023-08-21 PROCEDURE — 99213 OFFICE O/P EST LOW 20 MIN: CPT | Performed by: NURSE PRACTITIONER

## 2023-08-21 PROCEDURE — 82785 ASSAY OF IGE: CPT

## 2023-08-21 NOTE — PATIENT INSTRUCTIONS
Continue Dexilant 60 mg every morning  Continue pepcid 40 mg at night     Will check food allergy labs     F/u 3 months

## 2023-08-21 NOTE — PROGRESS NOTES
Chief Complaint   Heartburn    History of Present Illness       Vanesa Esquivel is a 58 y.o. female who presents to [facility] for follow-up for GERD.  She was last seen in the office by me on 5/8/2023.    Underwent EGD and colonoscopy with Dr. Thomas on 4/5/2023.  EGD showed gastritis.  Colonoscopy showed internal and external hemorrhoids otherwise normal exam.  Path positive for mild chronic inactive gastritis and erosions.  Reflux esophagitis.  Repeat colonoscopy in 5 years for surveillance.     Has history of GERD/gastritis/esophagitis and is on Nexium 40 mg every night and Pepcid 40 mg every morning as needed.Still having breakthrough reflux and will have trouble swallowing. Worse with pepper.      Will have loose and soft stools with fecal urgency. Has had fecal incontinence.  Normal abd US and HIDA scan last fall.  Feels like she has a nervous stomach and admits that she has been trying to wean herself off the BuSpar.  She reports it does help so she is wondering if maybe she should not get back on it.     GI family history--maternal grandfather with liver cancer. Paternal grandfather ?liver cancer.        She is happy to report switching the nexium to the dexilant has been so much better. No longer having the terrible episodes. Still having some reflux but much better. She will have two cups of coffee every morning. Bowels moving well. Good appetite. She is better on buspar. Having less anxiety. She will have what feels like swelling of her throat with certain foods. She wonders about food allergies.   Results       Result Review :       CMP          2/14/2023    16:47   CMP   Glucose 73    BUN 9    Creatinine 0.72    EGFR 98.3    Sodium 141    Potassium 4.2    Chloride 104    Calcium 9.5    Total Protein 7.4    Albumin 4.7    Globulin 2.7    Total Bilirubin 0.5    Alkaline Phosphatase 124    AST (SGOT) 21    ALT (SGPT) 15    Albumin/Globulin Ratio 1.7    BUN/Creatinine  Ratio 12.5    Anion Gap 9.0      CBC          2/14/2023    16:47   CBC   WBC 8.22    RBC 4.67    Hemoglobin 13.9    Hematocrit 39.9    MCV 85.4    MCH 29.8    MCHC 34.8    RDW 13.0    Platelets 366                        Past Medical History       Past Medical History:   Diagnosis Date    Acute atopic conjunctivitis, bilateral     Acute pharyngitis, unspecified     Chest pain on breathing     Fibromyalgia     Fibromyalgia, primary 2010    Gastro-esophageal reflux disease without esophagitis     Headache, tension-type     Off and on though out life    Low back pain     Major depressive disorder, single episode, mild     Memory loss     Past several years seems to be getting worse    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     Other atopic dermatitis     Other pruritus     Other specified anxiety disorders     Overweight     Primary generalized hypertrophic osteoarthrosis     Sleep apnea 05/01/2021    Tinnitus, bilateral     Vitamin D deficiency, unspecified        Past Surgical History:   Procedure Laterality Date    COLONOSCOPY N/A 04/05/2023    Procedure: COLONOSCOPY;  Surgeon: Melanie Thomas MD;  Location: [facility] ENDOSCOPY;  Service: Gastroenterology;  Laterality: N/A;  HEMORRHOIDS    ENDOSCOPY N/A 04/05/2023    Procedure: ESOPHAGOGASTRODUODENOSCOPY WITH BIOPSIES;  Surgeon: Melanie Thomas MD;  Location: [facility] ENDOSCOPY;  Service: Gastroenterology;  Laterality: N/A;  GASTRITIS    HYSTERECTOMY      ABN PAP    MOUTH SURGERY      OVARIAN CYST SURGERY      TUBAL ABDOMINAL LIGATION  11/22/1992         Current Outpatient Medications:     alclometasone (ACLOVATE) 0.05 % cream, Apply  topically to the appropriate area as directed See Admin Instructions. apply topically to the appropriate area as directed 2 times a day, Disp: 45 g, Rfl: 1    busPIRone (BUSPAR) 10 MG tablet, Take 0.5 tablets by mouth 2 (Two) Times a Day., Disp: 180 tablet, Rfl: 1    CBD (cannabidiol) oral oil, Take   "by mouth., Disp: , Rfl:     dexlansoprazole (DEXILANT) 60 MG capsule, TAKE 1 CAPSULE BY MOUTH DAILY, Disp: 90 capsule, Rfl: 0    escitalopram (LEXAPRO) 20 MG tablet, TAKE 1 TABLET BY MOUTH DAILY, Disp: 90 tablet, Rfl: 1    famotidine (PEPCID) 40 MG tablet, TAKE 1 TABLET BY MOUTH TWICE DAILY, Disp: 60 tablet, Rfl: 0    Multiple Vitamins-Minerals (EMERGEN-C IMMUNE PO), Take  by mouth., Disp: , Rfl:      Allergies   Allergen Reactions    Carafate [Sucralfate] Other (See Comments)     Tightness around her throat, dry mouth       Family History   Problem Relation Age of Onset    Alcohol abuse Mother     Heart disease Mother     Hypertension Mother     Stroke Father     Alcohol abuse Father     Diabetes Father     Hyperlipidemia Father     Heart attack Maternal Grandmother     Cancer Maternal Grandfather     Stroke Paternal Grandmother     Parkinsonism Paternal Grandmother     Cancer Paternal Grandfather         Social History     Social History Narrative    Not on file       Objective       Review of Systems   Constitutional:  Negative for appetite change, fatigue, fever, unexpected weight gain and unexpected weight loss.   HENT:  Negative for trouble swallowing.    Respiratory:  Negative for cough, choking, chest tightness, shortness of breath, wheezing and stridor.    Cardiovascular:  Negative for chest pain, palpitations and leg swelling.   Gastrointestinal:  Positive for GERD and indigestion. Negative for abdominal distention, abdominal pain, anal bleeding, blood in stool, constipation, diarrhea, nausea, rectal pain and vomiting.      Vital Signs:   /59 (BP Location: Right arm, Patient Position: Sitting, Cuff Size: Small Adult)   Pulse 83   Ht 160 cm (63\")   Wt 78.7 kg (173 lb 6.4 oz)   BMI 30.72 kg/mý       Physical Exam  Constitutional:       General: She is not in acute distress.     Appearance: She is well-developed. She is not ill-appearing.   HENT:      Head: Normocephalic.   Eyes:      Pupils: Pupils " are equal, round, and reactive to light.   Cardiovascular:      Rate and Rhythm: Normal rate and regular rhythm.      Heart sounds: Normal heart sounds.   Pulmonary:      Effort: Pulmonary effort is normal.      Breath sounds: Normal breath sounds.   Abdominal:      General: Bowel sounds are normal. There is no distension.      Palpations: Abdomen is soft. There is no mass.      Tenderness: There is no abdominal tenderness. There is no guarding or rebound.      Hernia: No hernia is present.   Musculoskeletal:         General: Normal range of motion.   Skin:     General: Skin is warm and dry.   Neurological:      Mental Status: She is alert and oriented to person, place, and time.   Psychiatric:         Speech: Speech normal.         Behavior: Behavior normal.         Judgment: Judgment normal.         Assessment & Plan          Assessment and Plan    Diagnoses and all orders for this visit:    1. Gastroesophageal reflux disease with esophagitis without hemorrhage (Primary)    2. Dysphagia, unspecified type    3. History of colon polyps      GERD seems much better with changing Nexium to Dexilant 60 mg daily.  Continue famotidine 40 mg at night.  Continue GERD precautions.  Swallowing has been better since Dexilant was started.  Bowels moving well currently.  Patient seems better back on BuSpar.  Patient to call the office with any issues.  Patient to follow-up with me in 3 months.  Patient is agreeable to the plan.          Follow Up       Follow Up   Return in about 3 months (around 11/21/2023) for GERD.  Patient was given instructions and counseling regarding her condition or for health maintenance advice. Please see specific information pulled into the AVS if appropriate.

## 2023-08-23 LAB
GLIADIN PEPTIDE IGA SER-ACNC: 23 UNITS (ref 0–19)
IGA SERPL-MCNC: 169 MG/DL (ref 87–352)
TTG IGA SER-ACNC: 3 U/ML (ref 0–3)

## 2023-08-24 LAB
ALPHA-GAL IGE QN: <0.1 KU/L
BEEF IGE QN: <0.1 KU/L
CONV CLASS DESCRIPTION: NORMAL
IGE SERPL-ACNC: 16 IU/ML (ref 6–495)
LAMB IGE QN: <0.1 KU/L
PORK IGE QN: <0.1 KU/L

## 2023-08-26 LAB
CLAM IGE QN: <0.1 KU/L
CODFISH IGE QN: <0.1 KU/L
CONV CLASS DESCRIPTION: NORMAL
CORN IGE QN: <0.1 KU/L
COW MILK IGE QN: <0.1 KU/L
EGG WHITE IGE QN: <0.1 KU/L
PEANUT IGE QN: <0.1 KU/L
SCALLOP IGE QN: <0.1 KU/L
SESAME SEED IGE QN: <0.1 KU/L
SHRIMP IGE QN: <0.1 KU/L
SOYBEAN IGE QN: <0.1 KU/L
WALNUT IGE QN: <0.1 KU/L
WHEAT IGE QN: <0.1 KU/L

## 2023-08-28 ENCOUNTER — TELEPHONE (OUTPATIENT)
Dept: GASTROENTEROLOGY | Facility: CLINIC | Age: 58
End: 2023-08-28
Payer: COMMERCIAL

## 2023-08-28 NOTE — TELEPHONE ENCOUNTER
----- Message from XIAO Boyd sent at 8/24/2023  3:00 PM EDT -----  Celiac is normal.  Awaiting alpha gal and food allergy.

## 2023-08-30 ENCOUNTER — TELEPHONE (OUTPATIENT)
Dept: GASTROENTEROLOGY | Facility: CLINIC | Age: 58
End: 2023-08-30
Payer: COMMERCIAL

## 2023-08-30 NOTE — TELEPHONE ENCOUNTER
----- Message from XIAO Boyd sent at 8/29/2023  1:05 PM EDT -----  Food allergy and alpha gal were normal.

## 2023-09-08 DIAGNOSIS — K21.00 GASTROESOPHAGEAL REFLUX DISEASE WITH ESOPHAGITIS WITHOUT HEMORRHAGE: ICD-10-CM

## 2023-09-08 RX ORDER — FAMOTIDINE 40 MG/1
TABLET, FILM COATED ORAL
Qty: 60 TABLET | Refills: 0 | Status: SHIPPED | OUTPATIENT
Start: 2023-09-08

## 2023-09-11 DIAGNOSIS — K21.00 GASTROESOPHAGEAL REFLUX DISEASE WITH ESOPHAGITIS WITHOUT HEMORRHAGE: ICD-10-CM

## 2023-09-11 RX ORDER — FAMOTIDINE 40 MG/1
40 TABLET, FILM COATED ORAL 2 TIMES DAILY
Qty: 60 TABLET | Refills: 0 | Status: CANCELLED | OUTPATIENT
Start: 2023-09-11

## 2023-09-11 NOTE — TELEPHONE ENCOUNTER
Pt is requesting famotidine. Order pended.   Last ov: 8/21/23  Next ov: 11/27/23  Last refill: 9/8/23

## 2023-10-17 ENCOUNTER — HOSPITAL ENCOUNTER (OUTPATIENT)
Dept: MAMMOGRAPHY | Facility: HOSPITAL | Age: 58
Discharge: HOME | End: 2023-10-17
Admitting: FAMILY MEDICINE
Payer: COMMERCIAL

## 2023-10-17 DIAGNOSIS — Z12.31 ENCOUNTER FOR SCREENING MAMMOGRAM FOR BREAST CANCER: ICD-10-CM

## 2023-10-17 PROCEDURE — 77067 SCR MAMMO BI INCL CAD: CPT

## 2023-10-17 PROCEDURE — 77063 BREAST TOMOSYNTHESIS BI: CPT

## 2023-10-23 ENCOUNTER — OFFICE VISIT (OUTPATIENT)
Dept: FAMILY MEDICINE CLINIC | Age: 58
End: 2023-10-23
Payer: COMMERCIAL

## 2023-10-23 VITALS
OXYGEN SATURATION: 97 % | SYSTOLIC BLOOD PRESSURE: 129 MMHG | HEIGHT: 63 IN | WEIGHT: 172 LBS | DIASTOLIC BLOOD PRESSURE: 48 MMHG | BODY MASS INDEX: 30.48 KG/M2 | HEART RATE: 89 BPM

## 2023-10-23 DIAGNOSIS — K21.00 GASTROESOPHAGEAL REFLUX DISEASE WITH ESOPHAGITIS WITHOUT HEMORRHAGE: ICD-10-CM

## 2023-10-23 DIAGNOSIS — H93.12 TINNITUS OF LEFT EAR: ICD-10-CM

## 2023-10-23 DIAGNOSIS — M79.7 FIBROMYALGIA: ICD-10-CM

## 2023-10-23 DIAGNOSIS — R68.82 DECREASED LIBIDO: ICD-10-CM

## 2023-10-23 DIAGNOSIS — E55.9 VITAMIN D DEFICIENCY, UNSPECIFIED: ICD-10-CM

## 2023-10-23 DIAGNOSIS — F32.0 MAJOR DEPRESSIVE DISORDER, SINGLE EPISODE, MILD: ICD-10-CM

## 2023-10-23 DIAGNOSIS — R42 VERTIGO: ICD-10-CM

## 2023-10-23 DIAGNOSIS — Z23 ENCOUNTER FOR IMMUNIZATION: Primary | ICD-10-CM

## 2023-10-23 RX ORDER — MECLIZINE HCL 12.5 MG/1
12.5 TABLET ORAL 3 TIMES DAILY PRN
Qty: 30 TABLET | Refills: 0 | Status: SHIPPED | OUTPATIENT
Start: 2023-10-23

## 2023-10-23 RX ORDER — ESCITALOPRAM OXALATE 20 MG/1
20 TABLET ORAL DAILY
Qty: 90 TABLET | Refills: 1 | Status: SHIPPED | OUTPATIENT
Start: 2023-10-23

## 2023-10-23 RX ORDER — FAMOTIDINE 40 MG/1
40 TABLET, FILM COATED ORAL 2 TIMES DAILY
Qty: 180 TABLET | Refills: 1 | Status: SHIPPED | OUTPATIENT
Start: 2023-10-23

## 2023-10-23 RX ORDER — DEXLANSOPRAZOLE 60 MG/1
60 CAPSULE, DELAYED RELEASE ORAL DAILY
Qty: 90 CAPSULE | Refills: 1 | Status: SHIPPED | OUTPATIENT
Start: 2023-10-23

## 2023-10-23 RX ORDER — BUSPIRONE HYDROCHLORIDE 10 MG/1
5 TABLET ORAL 2 TIMES DAILY
Qty: 180 TABLET | Refills: 1 | Status: SHIPPED | OUTPATIENT
Start: 2023-10-23 | End: 2023-10-24 | Stop reason: SDUPTHER

## 2023-10-23 NOTE — PROGRESS NOTES
"Vanesa Esquivel presents to [facility].    Chief Complaint:  med refills fibromyagia      Subjective     History of Present Illness:  Fibromyalgia  Associated symptoms include nausea. Pertinent negatives include no abdominal pain, chest pain, chills, coughing, fatigue, fever, rash, sore throat or vomiting.   She has sleep apnea, on cpap and it is breaking off her hair so she has quit using it    H/O orderline elevated alkaline phosphatase at 124.       Her chronic depression is ongoing but she is stable and functional on lexapro 20 mg daily and buspar 5 mg bid.  She has no libido and no sex drive.  Her relationship with her  is better.  She is off the trazodone and not sleeping.  Denies suicidal ideation.   She has longstanding fibromyagia with fatigue is stable on lexapro.  She also uses CBD oil which tends to help her more than anything.     She is on nexium for GERD has no complaints today    More vertigo recently, with tinnitus, some hearing loss, onset years ago but acutely worse the past few weeks, worse with motion like being in the car and tinnitus is worse laying down at night.       Result Review   The following data was reviewed by Fina Bardales MD on 10/23/2023.  Lab Results   Component Value Date    WBC 8.22 02/14/2023    HGB 13.9 02/14/2023    HCT 39.9 02/14/2023    MCV 85.4 02/14/2023     02/14/2023     Lab Results   Component Value Date    GLUCOSE 73 02/14/2023    BUN 9 02/14/2023    CREATININE 0.72 02/14/2023    EGFR 98.3 02/14/2023    BCR 12.5 02/14/2023    K 4.2 02/14/2023    CO2 28.0 02/14/2023    CALCIUM 9.5 02/14/2023    ALBUMIN 4.7 02/14/2023    BILITOT 0.5 02/14/2023    AST 21 02/14/2023    ALT 15 02/14/2023     Lab Results   Component Value Date    CHOL 139 06/10/2022    CHLPL 169 04/01/2021    TRIG 85 06/10/2022    HDL 83 (H) 06/10/2022    LDL 40 06/10/2022     No results found for: \"TSH\"  No results found for: \"HGBA1C\"  No results found for: " "\"PSA\"      Lab Results   Component Value Date    GCEY45IK 54.0 06/10/2022               Assessment and Plan:   Diagnoses and all orders for this visit:    1. Encounter for immunization (Primary)  Comments:  Recommend Shingrix, flu and COVID booster  Orders:  -     COVID-19 F23 (Pfizer) 12yrs+ (COMIRNATY)  -     Fluzone >6 Months (2956-1944)    2. Fibromyalgia  Comments:  Chronic pain is stable and she is functional on lexapro.  Continue current meds  Orders:  -     busPIRone (BUSPAR) 10 MG tablet; Take 1/2 tablet by mouth 2 (Two) Times a Day.  Dispense: 180 tablet; Refill: 1  -     escitalopram (LEXAPRO) 20 MG tablet; Take 1 tablet by mouth Daily.  Dispense: 90 tablet; Refill: 1    3. Major depressive disorder, single episode, mild  Comments:  Stable and she is in a good place.  Continue Lexapro and BuSpar as directed  Orders:  -     busPIRone (BUSPAR) 10 MG tablet; Take 1/2 tablet by mouth 2 (Two) Times a Day.  Dispense: 180 tablet; Refill: 1  -     escitalopram (LEXAPRO) 20 MG tablet; Take 1 tablet by mouth Daily.  Dispense: 90 tablet; Refill: 1    4. Gastroesophageal reflux disease with esophagitis without hemorrhage  Comments:  stable and well controlled on dexilant  Orders:  -     famotidine (PEPCID) 40 MG tablet; Take 1 tablet by mouth 2 (Two) Times a Day.  Dispense: 180 tablet; Refill: 1    5. Tinnitus of left ear    6. Vitamin D deficiency, unspecified  Comments:  off vit d supplementation    7. Decreased libido  Comments:  referral to Citlali Herrera to al for hormone replacement.  Orders:  -     Ambulatory Referral to Gynecology    8. Vertigo  Comments:  neg nicholsa gudino, offered meclizine for prn with trazel  Orders:  -     meclizine (ANTIVERT) 12.5 MG tablet; Take 1 tablet by mouth 3 (Three) Times a Day As Needed for Dizziness.  Dispense: 30 tablet; Refill: 0    Other orders  -     dexlansoprazole (DEXILANT) 60 MG capsule; Take 1 capsule by mouth Daily.  Dispense: 90 capsule; Refill: " "1              Objective     Medications:  Current Outpatient Medications   Medication Instructions    alclometasone (ACLOVATE) 0.05 % cream Topical, See Admin Instructions, apply topically to the appropriate area as directed 2 times a day    busPIRone (BUSPAR) 10 MG tablet Take 1/2 tablet by mouth 2 (Two) Times a Day.    CBD (cannabidiol) oral oil Oral    dexlansoprazole (DEXILANT) 60 mg, Oral, Daily    escitalopram (LEXAPRO) 20 mg, Oral, Daily    famotidine (PEPCID) 40 mg, Oral, 2 Times Daily    meclizine (ANTIVERT) 12.5 mg, Oral, 3 Times Daily PRN    Multiple Vitamins-Minerals (EMERGEN-C IMMUNE PO) Take  by mouth.        Vital Signs:   /48 (BP Location: Left arm, Patient Position: Sitting)   Pulse 89   Ht 160 cm (62.99\")   Wt 78 kg (172 lb)   SpO2 97%   BMI 30.48 kg/m²             Physical Exam:  Physical Exam  Vitals and nursing note reviewed.   Constitutional:       General: She is not in acute distress.     Appearance: Normal appearance. She is not ill-appearing, toxic-appearing or diaphoretic.   HENT:      Head: Normocephalic and atraumatic.      Right Ear: Tympanic membrane, ear canal and external ear normal.      Left Ear: Tympanic membrane, ear canal and external ear normal.      Nose: No congestion or rhinorrhea.      Mouth/Throat:      Mouth: Mucous membranes are moist.      Pharynx: Oropharynx is clear. No oropharyngeal exudate or posterior oropharyngeal erythema.   Eyes:      Extraocular Movements: Extraocular movements intact.      Conjunctiva/sclera: Conjunctivae normal.      Pupils: Pupils are equal, round, and reactive to light.   Cardiovascular:      Rate and Rhythm: Normal rate and regular rhythm.      Heart sounds: Normal heart sounds.   Pulmonary:      Effort: Pulmonary effort is normal.      Breath sounds: Normal breath sounds. No wheezing, rhonchi or rales.   Abdominal:      General: Abdomen is flat.      Palpations: Abdomen is soft. There is no mass.      Tenderness: There is no " abdominal tenderness.      Hernia: No hernia is present.   Musculoskeletal:      Cervical back: Neck supple. No rigidity.      Right lower leg: No edema.      Left lower leg: No edema.   Lymphadenopathy:      Cervical: No cervical adenopathy.   Skin:     General: Skin is warm and dry.   Neurological:      General: No focal deficit present.      Mental Status: She is alert and oriented to person, place, and time. Mental status is at baseline.   Psychiatric:         Mood and Affect: Mood normal.         Behavior: Behavior normal.         Thought Content: Thought content normal.         Judgment: Judgment normal.           Review of Systems:  Review of Systems   Constitutional:  Negative for chills, fatigue and fever.   HENT:  Negative for ear pain, sinus pressure and sore throat.    Eyes:  Negative for blurred vision and double vision.   Respiratory:  Negative for cough, shortness of breath and wheezing.    Cardiovascular:  Negative for chest pain and palpitations.   Gastrointestinal:  Positive for nausea and GERD. Negative for abdominal pain, blood in stool, constipation, diarrhea and vomiting.   Skin:  Negative for rash.   Neurological:  Positive for dizziness. Negative for headache.   Psychiatric/Behavioral:  Positive for sleep disturbance and depressed mood. Negative for suicidal ideas. The patient is not nervous/anxious.               Follow Up   Return in about 6 months (around 4/23/2024) for Annual physical.    Part of this note may be an electronic transcription/translation of spoken language to printed   text using the Dragon Dictation System.            Medical History:  Medications Discontinued During This Encounter   Medication Reason    busPIRone (BUSPAR) 10 MG tablet Reorder    escitalopram (LEXAPRO) 20 MG tablet Reorder    dexlansoprazole (DEXILANT) 60 MG capsule Reorder    famotidine (PEPCID) 40 MG tablet Reorder      Past Medical History:    Acute atopic conjunctivitis, bilateral    Acute pharyngitis,  unspecified    Chest pain on breathing    Fibromyalgia    Fibromyalgia, primary    Gastro-esophageal reflux disease without esophagitis    Headache, tension-type    Off and on though out life    Low back pain    Major depressive disorder, single episode, mild    Memory loss    Past several years seems to be getting worse    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    Other atopic dermatitis    Other pruritus    Other specified anxiety disorders    Overweight    Primary generalized hypertrophic osteoarthrosis    Sleep apnea    Tinnitus, bilateral    Vitamin D deficiency, unspecified     Past Surgical History:    COLONOSCOPY    Procedure: COLONOSCOPY;  Surgeon: Melanie Thomas MD;  Location: [facility] ENDOSCOPY;  Service: Gastroenterology;  Laterality: N/A;  HEMORRHOIDS    ENDOSCOPY    Procedure: ESOPHAGOGASTRODUODENOSCOPY WITH BIOPSIES;  Surgeon: Melanie Thomas MD;  Location: [facility] ENDOSCOPY;  Service: Gastroenterology;  Laterality: N/A;  GASTRITIS    HYSTERECTOMY    ABN PAP    MOUTH SURGERY    OVARIAN CYST SURGERY    TUBAL ABDOMINAL LIGATION      Family History   Problem Relation Age of Onset    Alcohol abuse Mother     Heart disease Mother     Hypertension Mother     Stroke Father     Alcohol abuse Father     Diabetes Father     Hyperlipidemia Father     Heart attack Maternal Grandmother     Cancer Maternal Grandfather     Stroke Paternal Grandmother     Parkinsonism Paternal Grandmother     Cancer Paternal Grandfather      Social History     Tobacco Use    Smoking status: Never    Smokeless tobacco: Never   Substance Use Topics    Alcohol use: Not Currently       There are no preventive care reminders to display for this patient.       Immunization History   Administered Date(s) Administered    COVID-19 (MODERNA) 1st,2nd,3rd Dose Monovalent 02/12/2021, 03/12/2021    COVID-19 (MODERNA) Monovalent Original Booster 12/10/2021, 04/12/2022    COVID-19 F23 (PFIZER) 12YRS+ (COMIRNATY)  10/23/2023    Flu Vaccine Intradermal Quad 18-64YR 09/15/2020    Fluzone (or Fluarix & Flulaval for VFC) >6mos 10/27/2022, 10/23/2023    Influenza Injectable Mdck Pf Quad 09/15/2020    Influenza, Unspecified 09/17/2020, 10/27/2022    Tdap 06/02/2022       Allergies   Allergen Reactions    Carafate [Sucralfate] Other (See Comments)     Tightness around her throat, dry mouth

## 2023-10-24 ENCOUNTER — TELEPHONE (OUTPATIENT)
Dept: FAMILY MEDICINE CLINIC | Age: 58
End: 2023-10-24
Payer: COMMERCIAL

## 2023-10-24 DIAGNOSIS — M79.7 FIBROMYALGIA: ICD-10-CM

## 2023-10-24 DIAGNOSIS — F32.0 MAJOR DEPRESSIVE DISORDER, SINGLE EPISODE, MILD: ICD-10-CM

## 2023-10-24 RX ORDER — BUSPIRONE HYDROCHLORIDE 10 MG/1
10 TABLET ORAL 2 TIMES DAILY
Start: 2023-10-24 | End: 2023-10-24 | Stop reason: SDUPTHER

## 2023-10-24 RX ORDER — BUSPIRONE HYDROCHLORIDE 10 MG/1
10 TABLET ORAL 2 TIMES DAILY
Qty: 60 TABLET | Refills: 5 | Status: SHIPPED | OUTPATIENT
Start: 2023-10-24

## 2023-10-24 NOTE — TELEPHONE ENCOUNTER
Pt said that you had discussed increasing her buspar to 1 tablet bid but the rx that was sent in was still 1/2 tablet bid please advise

## 2023-11-27 ENCOUNTER — OFFICE VISIT (OUTPATIENT)
Dept: GASTROENTEROLOGY | Facility: CLINIC | Age: 58
End: 2023-11-27
Payer: COMMERCIAL

## 2023-11-27 VITALS
DIASTOLIC BLOOD PRESSURE: 59 MMHG | HEIGHT: 63 IN | SYSTOLIC BLOOD PRESSURE: 126 MMHG | BODY MASS INDEX: 30.37 KG/M2 | WEIGHT: 171.4 LBS | HEART RATE: 88 BPM

## 2023-11-27 DIAGNOSIS — K21.00 GASTROESOPHAGEAL REFLUX DISEASE WITH ESOPHAGITIS WITHOUT HEMORRHAGE: Primary | ICD-10-CM

## 2023-11-27 DIAGNOSIS — A09 DIARRHEA OF INFECTIOUS ORIGIN: ICD-10-CM

## 2023-11-27 DIAGNOSIS — R13.10 DYSPHAGIA, UNSPECIFIED TYPE: ICD-10-CM

## 2023-11-27 DIAGNOSIS — Z87.19 HISTORY OF GASTRITIS: ICD-10-CM

## 2023-11-27 PROCEDURE — 99214 OFFICE O/P EST MOD 30 MIN: CPT | Performed by: NURSE PRACTITIONER

## 2023-11-27 RX ORDER — FAMOTIDINE 40 MG/1
40 TABLET, FILM COATED ORAL 2 TIMES DAILY
Qty: 180 TABLET | Refills: 3 | Status: SHIPPED | OUTPATIENT
Start: 2023-11-27

## 2023-11-27 NOTE — PROGRESS NOTES
Chief Complaint   Heartburn and Diarrhea    History of Present Illness       Vanesa Esquivel is a 58 y.o. female who presents to [facility] for follow-up for GERD. She was last seen in the office by me on 8/21/23.    Underwent EGD and colonoscopy with Dr. Thomas on 4/5/2023.  EGD showed gastritis.  Colonoscopy showed internal and external hemorrhoids otherwise normal exam.  Path positive for mild chronic inactive gastritis and erosions.  Reflux esophagitis.  Repeat colonoscopy in 5 years for surveillance.     Has history of GERD/gastritis/esophagitis and is on Nexium 40 mg every night and Pepcid 40 mg every morning as needed.Still having breakthrough reflux and will have trouble swallowing. Worse with pepper.      Will have loose and soft stools with fecal urgency. Has had fecal incontinence.  Normal abd US and HIDA scan last fall.  Feels like she has a nervous stomach and admits that she has been trying to wean herself off the BuSpar.  She reports it does help so she is wondering if maybe she should not get back on it.     GI family history--maternal grandfather with liver cancer. Paternal grandfather ?liver cancer.         She admits she has done really well since last visit until she got COVID earlier this month. She did start probiotics two days ago. Still having issues with reflux and irregular bowels. Diarrhea seems to be slowing down. Urgency is still there. She is eating blandly. Appetite has been less.   Results       Result Review :       CMP          2/14/2023    16:47   CMP   Glucose 73    BUN 9    Creatinine 0.72    EGFR 98.3    Sodium 141    Potassium 4.2    Chloride 104    Calcium 9.5    Total Protein 7.4    Albumin 4.7    Globulin 2.7    Total Bilirubin 0.5    Alkaline Phosphatase 124    AST (SGOT) 21    ALT (SGPT) 15    Albumin/Globulin Ratio 1.7    BUN/Creatinine Ratio 12.5    Anion Gap 9.0      CBC          2/14/2023    16:47   CBC   WBC 8.22    RBC 4.67   "  Hemoglobin 13.9    Hematocrit 39.9    MCV 85.4    MCH 29.8    MCHC 34.8    RDW 13.0    Platelets 366            Lipase No results found for: \"LIPASE\"  Amylase No results found for: \"AMYLASE\"            Past Medical History       Past Medical History:   Diagnosis Date    Acute atopic conjunctivitis, bilateral     Acute pharyngitis, unspecified     Chest pain on breathing     Fibromyalgia     Fibromyalgia, primary 2010    Gastro-esophageal reflux disease without esophagitis     Headache, tension-type     Off and on though out life    Low back pain     Major depressive disorder, single episode, mild     Memory loss     Past several years seems to be getting worse    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     Other atopic dermatitis     Other pruritus     Other specified anxiety disorders     Overweight     Primary generalized hypertrophic osteoarthrosis     Sleep apnea 05/01/2021    Tinnitus, bilateral     Vitamin D deficiency, unspecified        Past Surgical History:   Procedure Laterality Date    COLONOSCOPY N/A 04/05/2023    Procedure: COLONOSCOPY;  Surgeon: Melanie Thomas MD;  Location: [facility] ENDOSCOPY;  Service: Gastroenterology;  Laterality: N/A;  HEMORRHOIDS    ENDOSCOPY N/A 04/05/2023    Procedure: ESOPHAGOGASTRODUODENOSCOPY WITH BIOPSIES;  Surgeon: Melanie Thomas MD;  Location: [facility] ENDOSCOPY;  Service: Gastroenterology;  Laterality: N/A;  GASTRITIS    HYSTERECTOMY      ABN PAP    MOUTH SURGERY      OVARIAN CYST SURGERY      TUBAL ABDOMINAL LIGATION  11/22/1992         Current Outpatient Medications:     alclometasone (ACLOVATE) 0.05 % cream, Apply  topically to the appropriate area as directed See Admin Instructions. apply topically to the appropriate area as directed 2 times a day, Disp: 45 g, Rfl: 1    busPIRone (BUSPAR) 10 MG tablet, Take 1 tablet by mouth 2 (Two) Times a Day., Disp: 60 tablet, Rfl: 5    CBD (cannabidiol) oral oil, Take  by mouth., Disp: , " "Rfl:     dexlansoprazole (DEXILANT) 60 MG capsule, Take 1 capsule by mouth Daily., Disp: 90 capsule, Rfl: 1    escitalopram (LEXAPRO) 20 MG tablet, Take 1 tablet by mouth Daily., Disp: 90 tablet, Rfl: 1    famotidine (PEPCID) 40 MG tablet, Take 1 tablet by mouth 2 (Two) Times a Day., Disp: 180 tablet, Rfl: 3    meclizine (ANTIVERT) 12.5 MG tablet, Take 1 tablet by mouth 3 (Three) Times a Day As Needed for Dizziness., Disp: 30 tablet, Rfl: 0    Multiple Vitamins-Minerals (EMERGEN-C IMMUNE PO), Take  by mouth., Disp: , Rfl:      Allergies   Allergen Reactions    Carafate [Sucralfate] Other (See Comments)     Tightness around her throat, dry mouth       Family History   Problem Relation Age of Onset    Alcohol abuse Mother     Heart disease Mother     Hypertension Mother     Stroke Father     Alcohol abuse Father     Diabetes Father     Hyperlipidemia Father     Heart attack Maternal Grandmother     Cancer Maternal Grandfather     Stroke Paternal Grandmother     Parkinsonism Paternal Grandmother     Cancer Paternal Grandfather         Social History     Social History Narrative    Not on file       Objective       Review of Systems   Constitutional:  Positive for appetite change and fatigue. Negative for chills, diaphoresis, fever, unexpected weight gain and unexpected weight loss.   HENT:  Negative for trouble swallowing.    Respiratory:  Negative for cough, choking, chest tightness, shortness of breath, wheezing and stridor.    Cardiovascular:  Negative for chest pain, palpitations and leg swelling.   Gastrointestinal:  Positive for abdominal distention, diarrhea, nausea, GERD and indigestion. Negative for abdominal pain, anal bleeding, blood in stool, constipation, rectal pain and vomiting.        Vital Signs:   /59 (BP Location: Left arm, Patient Position: Sitting, Cuff Size: Adult)   Pulse 88   Ht 160 cm (63\")   Wt 77.7 kg (171 lb 6.4 oz)   BMI 30.36 kg/m²       Physical Exam  Constitutional:       " General: She is not in acute distress.     Appearance: She is well-developed. She is not ill-appearing.   HENT:      Head: Normocephalic.   Eyes:      Pupils: Pupils are equal, round, and reactive to light.   Cardiovascular:      Rate and Rhythm: Normal rate and regular rhythm.      Heart sounds: Normal heart sounds.   Pulmonary:      Effort: Pulmonary effort is normal.      Breath sounds: Normal breath sounds.   Abdominal:      General: Bowel sounds are normal. There is no distension.      Palpations: Abdomen is soft. There is no mass.      Tenderness: There is no abdominal tenderness. There is no guarding or rebound.      Hernia: No hernia is present.   Musculoskeletal:         General: Normal range of motion.   Skin:     General: Skin is warm and dry.   Neurological:      Mental Status: She is alert and oriented to person, place, and time.   Psychiatric:         Speech: Speech normal.         Behavior: Behavior normal.         Judgment: Judgment normal.           Assessment & Plan          Assessment and Plan    Diagnoses and all orders for this visit:    1. Gastroesophageal reflux disease with esophagitis without hemorrhage (Primary)  Comments:  stable and well controlled on dexilant  Orders:  -     famotidine (PEPCID) 40 MG tablet; Take 1 tablet by mouth 2 (Two) Times a Day.  Dispense: 180 tablet; Refill: 3    2. Dysphagia, unspecified type    3. History of gastritis    4. Diarrhea of infectious origin      GERD does not sound well controlled since she has had COVID.  Will increase the Pepcid to 40 mg twice daily for right now.  Continue Dexilant 60 mg daily.  Continue GERD precautions.  Continue bland diet.  Still struggling with diarrhea, fecal urgency and bloating.  Recommend she continue the probiotics.  Hopefully things will start to improve in another week or 2.  Patient to call the office in 2 weeks with an update.  Patient to follow-up with me in 3 months.  Patient is agreeable to the  plan.          Follow Up       Follow Up   Return in about 3 months (around 2/27/2024).  Patient was given instructions and counseling regarding her condition or for health maintenance advice. Please see specific information pulled into the AVS if appropriate.

## 2023-11-28 NOTE — PROGRESS NOTES
GYN new patient    CC: decreased libido    Tobacco/Nicotine use:  No    HPI:   58 y.o. Contraception or HRT: Post menopausal, using no HRT, s/p HYST, still has one or both ovaries, benign indications    Pt is c/o completely absent libido.  States she has absolutely no desire and when she has PIV intercourse it is incredibly painful.Hasn't taken HRT since her SHELIA .  Retains one ovary. C/o hot flashes and night sweats and brain fog .  Pt states vaginal dryness is severe and that contributes to pain with PIV intercourse      History: PMHx, Meds, Allergies, PSHx, Social Hx, and POBHx all reviewed and updated.  PCP:Fina Bardales MD      Review of Systems     /70   Pulse 87   Wt 77.6 kg (171 lb)   Breastfeeding No   BMI 30.29 kg/m²     Physical Exam  Vitals and nursing note reviewed. Exam conducted with a chaperone present.   Genitourinary:     Exam position: Lithotomy position.      Labia:         Right: No lesion.         Left: No lesion.       Urethra: No prolapse or urethral lesion.      Vagina: No foreign body. Tenderness (Bilateral obturator internus left side more than right side) present. No prolapsed vaginal walls.      Uterus: Absent.       Adnexa:         Right: No tenderness.          Left: No tenderness.               ASSESSMENT AND PLAN:  Problem Visit    Diagnoses and all orders for this visit:    1. Genitourinary syndrome of menopause (Primary)  Assessment & Plan:  Pt with moderate to severe vulvovaginal atrophy with severe resorption of right labia minora and moderate resorption of right labia minora.  Counseled pt re: r/b of localized vaginal estrogen and how it can resolve vaginal dryness and improve dyspareunia    Orders:  -     estradiol (ESTRACE VAGINAL) 0.1 MG/GM vaginal cream; Insert 0.5 g into the vagina and massage 0.5g to vulva and vestibule at night 2 (Two) Times a Week.  Dispense: 42.5 g; Refill: 3    2. Female dyspareunia  Assessment & Plan:  Largely secondary to  GSM as well as high tone pelvic floor dysfunction.  Recommend a multidisciplinary approach to treating dyspareunia with localized vaginal estrogen and pelvic floor physical therapy    Orders:  -     Ambulatory Referral to Physical Therapy Evaluate and treat, Pelvic Floor    3. High-tone pelvic floor dysfunction  Assessment & Plan:  Pt has c/o generalized pelvic pain since her SHELIA in 2005.  She denies any constipation.  She does have jaw clenching  On exam she has tenderness and high tone bilateral obturator internus with the left side worse than the right side  Pt has also had dyspareunia for the past several years    Orders:  -     Ambulatory Referral to Physical Therapy Evaluate and treat, Pelvic Floor    4. Decreased libido  Assessment & Plan:  Psychological condition is newly identified.  Medication changes per orders.  Counseled the patient at length regarding multifactorial nature of libido.  He is difficult to fully assess libido related intimacy is painful.  I recommend treating GSM and subsequent dyspareunia before for evaluating treatment for decreased beta  Psychological condition  will be reassessed at the next regular appointment.                      Follow Up:  Return in about 3 months (around 2/29/2024).        Citlali Herrera MD  11/29/2023

## 2023-11-29 ENCOUNTER — OFFICE VISIT (OUTPATIENT)
Dept: OBSTETRICS AND GYNECOLOGY | Facility: CLINIC | Age: 58
End: 2023-11-29
Payer: COMMERCIAL

## 2023-11-29 VITALS
BODY MASS INDEX: 30.29 KG/M2 | DIASTOLIC BLOOD PRESSURE: 70 MMHG | HEART RATE: 87 BPM | SYSTOLIC BLOOD PRESSURE: 103 MMHG | WEIGHT: 171 LBS

## 2023-11-29 DIAGNOSIS — N94.89 HIGH-TONE PELVIC FLOOR DYSFUNCTION: ICD-10-CM

## 2023-11-29 DIAGNOSIS — R68.82 DECREASED LIBIDO: ICD-10-CM

## 2023-11-29 DIAGNOSIS — N95.8 GENITOURINARY SYNDROME OF MENOPAUSE: Primary | ICD-10-CM

## 2023-11-29 DIAGNOSIS — N94.10 FEMALE DYSPAREUNIA: ICD-10-CM

## 2023-11-29 PROBLEM — M62.89 HIGH-TONE PELVIC FLOOR DYSFUNCTION: Status: ACTIVE | Noted: 2023-11-29

## 2023-11-29 RX ORDER — ESTRADIOL 0.1 MG/G
1 CREAM VAGINAL 2 TIMES WEEKLY
Qty: 42.5 G | Refills: 3 | Status: SHIPPED | OUTPATIENT
Start: 2023-11-30

## 2023-11-29 NOTE — ASSESSMENT & PLAN NOTE
Pt has c/o generalized pelvic pain since her SHELIA in 2005.  She denies any constipation.  She does have jaw clenching  On exam she has tenderness and high tone bilateral obturator internus with the left side worse than the right side  Pt has also had dyspareunia for the past several years

## 2023-11-29 NOTE — ASSESSMENT & PLAN NOTE
Largely secondary to GSM as well as high tone pelvic floor dysfunction.  Recommend a multidisciplinary approach to treating dyspareunia with localized vaginal estrogen and pelvic floor physical therapy

## 2023-11-29 NOTE — ASSESSMENT & PLAN NOTE
Psychological condition is newly identified.  Medication changes per orders.  Counseled the patient at length regarding multifactorial nature of libido.  He is difficult to fully assess libido related intimacy is painful.  I recommend treating GSM and subsequent dyspareunia before for evaluating treatment for decreased beta  Psychological condition  will be reassessed at the next regular appointment.

## 2023-11-29 NOTE — ASSESSMENT & PLAN NOTE
Pt with moderate to severe vulvovaginal atrophy with severe resorption of right labia minora and moderate resorption of right labia minora.  Counseled pt re: r/b of localized vaginal estrogen and how it can resolve vaginal dryness and improve dyspareunia

## 2023-11-30 ENCOUNTER — PATIENT ROUNDING (BHMG ONLY) (OUTPATIENT)
Dept: OBSTETRICS AND GYNECOLOGY | Facility: CLINIC | Age: 58
End: 2023-11-30
Payer: COMMERCIAL

## 2023-11-30 NOTE — PROGRESS NOTES
A My-Chart message has been sent to the patient for [place] [facility] [place]

## 2023-12-02 DIAGNOSIS — M79.7 FIBROMYALGIA: ICD-10-CM

## 2023-12-02 DIAGNOSIS — F32.0 MAJOR DEPRESSIVE DISORDER, SINGLE EPISODE, MILD: ICD-10-CM

## 2023-12-04 RX ORDER — ESCITALOPRAM OXALATE 20 MG/1
20 TABLET ORAL DAILY
Qty: 90 TABLET | Refills: 1 | OUTPATIENT
Start: 2023-12-04

## 2024-02-07 DIAGNOSIS — K21.00 GASTROESOPHAGEAL REFLUX DISEASE WITH ESOPHAGITIS WITHOUT HEMORRHAGE: ICD-10-CM

## 2024-02-07 RX ORDER — FAMOTIDINE 40 MG/1
40 TABLET, FILM COATED ORAL 2 TIMES DAILY
Qty: 180 TABLET | Refills: 3 | Status: SHIPPED | OUTPATIENT
Start: 2024-02-07

## 2024-03-25 NOTE — PROGRESS NOTES
GYN Visit    CC: RM    HPI:   58 y.o. Contraception or HRT: Post menopausal, using no HRT, s/p HYST, still has one or both ovaries, benign indications        The patient states she did not continue using the vaginal estrogen cream as it was too messy.  She states she has had improvement in painful intimacy with personal lubricant and pelvic floor physical therapy.  She states she is still having difficulty with pleasuring orgasm.  She continues to have decreased libido          History: PMHx, Meds, Allergies, PSHx, Social Hx, and POBHx all reviewed and updated.  Physical Exam   PHYSICAL EXAM:  /72   Wt 79.4 kg (175 lb)   BMI 31.00 kg/m²   General- NAD, alert and oriented, appropriate  Psych- Normal mood, good memory    ASSESSMENT AND PLAN:  Diagnoses and all orders for this visit:    1. Genitourinary syndrome of menopause (Primary)  Assessment & Plan:  Patient is no longer using vaginal estrogen cream.  She states she does not like the mass of applying it.  We will change to Vagifem tablets    Orders:  -     estradiol (Vagifem) 10 MCG tablet vaginal tablet; Insert 1 tablet into the vagina 2 (Two) Times a Week.  Dispense: 8 tablet; Refill: 11    2. Female dyspareunia  Assessment & Plan:  Patient states she has had some improvement in symptoms with personal lubricant and having gone to pelvic floor physical therapy.    Orders:  -     estradiol (Vagifem) 10 MCG tablet vaginal tablet; Insert 1 tablet into the vagina 2 (Two) Times a Week.  Dispense: 8 tablet; Refill: 11    3. Decreased libido  Assessment & Plan:  Psychological condition is unchanged.  Discussed the use of Vyleesi for on-demand treatment of symptoms as patient states her interactions are infrequent.  Patient declines medication at this time.  We did discuss the addition of testosterone also  Psychological condition  will be reassessed at the next regular appointment.      4. High-tone pelvic floor dysfunction                Follow  Up:  Return for Annual physical.          Citlali Herrera MD  03/26/2024

## 2024-03-26 ENCOUNTER — OFFICE VISIT (OUTPATIENT)
Dept: OBSTETRICS AND GYNECOLOGY | Facility: CLINIC | Age: 59
End: 2024-03-26
Payer: COMMERCIAL

## 2024-03-26 VITALS — SYSTOLIC BLOOD PRESSURE: 110 MMHG | WEIGHT: 175 LBS | BODY MASS INDEX: 31 KG/M2 | DIASTOLIC BLOOD PRESSURE: 72 MMHG

## 2024-03-26 DIAGNOSIS — R68.82 DECREASED LIBIDO: ICD-10-CM

## 2024-03-26 DIAGNOSIS — M62.89 HIGH-TONE PELVIC FLOOR DYSFUNCTION: ICD-10-CM

## 2024-03-26 DIAGNOSIS — N95.8 GENITOURINARY SYNDROME OF MENOPAUSE: Primary | ICD-10-CM

## 2024-03-26 DIAGNOSIS — N94.10 FEMALE DYSPAREUNIA: ICD-10-CM

## 2024-03-26 PROCEDURE — 99213 OFFICE O/P EST LOW 20 MIN: CPT | Performed by: OBSTETRICS & GYNECOLOGY

## 2024-03-26 RX ORDER — ESTRADIOL 10 UG/1
1 INSERT VAGINAL 2 TIMES WEEKLY
Qty: 8 TABLET | Refills: 11 | Status: SHIPPED | OUTPATIENT
Start: 2024-03-28

## 2024-03-26 NOTE — ASSESSMENT & PLAN NOTE
Psychological condition is unchanged.  Discussed the use of Vyleesi for on-demand treatment of symptoms as patient states her interactions are infrequent.  Patient declines medication at this time.  We did discuss the addition of testosterone also  Psychological condition  will be reassessed at the next regular appointment.

## 2024-03-26 NOTE — ASSESSMENT & PLAN NOTE
Patient states she has had some improvement in symptoms with personal lubricant and having gone to pelvic floor physical therapy.

## 2024-03-26 NOTE — ASSESSMENT & PLAN NOTE
Patient is no longer using vaginal estrogen cream.  She states she does not like the mass of applying it.  We will change to Vagifem tablets

## 2024-04-23 ENCOUNTER — HOSPITAL ENCOUNTER (OUTPATIENT)
Dept: GENERAL RADIOLOGY | Facility: HOSPITAL | Age: 59
Discharge: HOME | End: 2024-04-23
Payer: COMMERCIAL

## 2024-04-23 ENCOUNTER — OFFICE VISIT (OUTPATIENT)
Dept: FAMILY MEDICINE CLINIC | Age: 59
End: 2024-04-23
Payer: COMMERCIAL

## 2024-04-23 ENCOUNTER — LAB (OUTPATIENT)
Dept: LAB | Facility: HOSPITAL | Age: 59
End: 2024-04-23
Payer: COMMERCIAL

## 2024-04-23 VITALS
OXYGEN SATURATION: 97 % | HEART RATE: 79 BPM | HEIGHT: 63 IN | BODY MASS INDEX: 31.01 KG/M2 | SYSTOLIC BLOOD PRESSURE: 116 MMHG | DIASTOLIC BLOOD PRESSURE: 71 MMHG | WEIGHT: 175 LBS

## 2024-04-23 DIAGNOSIS — Z78.0 POSTMENOPAUSAL STATE: ICD-10-CM

## 2024-04-23 DIAGNOSIS — F41.8 ANXIETY WITH DEPRESSION: ICD-10-CM

## 2024-04-23 DIAGNOSIS — Z23 ENCOUNTER FOR IMMUNIZATION: ICD-10-CM

## 2024-04-23 DIAGNOSIS — Z12.31 ENCOUNTER FOR SCREENING MAMMOGRAM FOR BREAST CANCER: ICD-10-CM

## 2024-04-23 DIAGNOSIS — Z79.890 HORMONE REPLACEMENT THERAPY (HRT): ICD-10-CM

## 2024-04-23 DIAGNOSIS — Z01.419 WELL WOMAN EXAM: ICD-10-CM

## 2024-04-23 DIAGNOSIS — E55.9 VITAMIN D DEFICIENCY, UNSPECIFIED: ICD-10-CM

## 2024-04-23 DIAGNOSIS — Z00.00 ANNUAL PHYSICAL EXAM: Primary | ICD-10-CM

## 2024-04-23 DIAGNOSIS — F32.0 MAJOR DEPRESSIVE DISORDER, SINGLE EPISODE, MILD: ICD-10-CM

## 2024-04-23 DIAGNOSIS — Z13.6 ENCOUNTER FOR SCREENING FOR CARDIOVASCULAR DISORDERS: ICD-10-CM

## 2024-04-23 DIAGNOSIS — K21.9 CHRONIC GERD: ICD-10-CM

## 2024-04-23 DIAGNOSIS — M25.562 ACUTE PAIN OF LEFT KNEE: ICD-10-CM

## 2024-04-23 DIAGNOSIS — Z12.11 COLON CANCER SCREENING: ICD-10-CM

## 2024-04-23 DIAGNOSIS — M79.7 FIBROMYALGIA: ICD-10-CM

## 2024-04-23 LAB
25(OH)D3 SERPL-MCNC: 33 NG/ML (ref 30–100)
ALBUMIN SERPL-MCNC: 4.3 G/DL (ref 3.5–5.2)
ALBUMIN/GLOB SERPL: 1.9 G/DL
ALP SERPL-CCNC: 105 U/L (ref 39–117)
ALT SERPL W P-5'-P-CCNC: 17 U/L (ref 1–33)
ANION GAP SERPL CALCULATED.3IONS-SCNC: 9.4 MMOL/L (ref 5–15)
AST SERPL-CCNC: 14 U/L (ref 1–32)
BILIRUB BLD-MCNC: NEGATIVE MG/DL
BILIRUB SERPL-MCNC: 0.5 MG/DL (ref 0–1.2)
BUN SERPL-MCNC: 12 MG/DL (ref 6–20)
BUN/CREAT SERPL: 17.6 (ref 7–25)
CALCIUM SPEC-SCNC: 9.3 MG/DL (ref 8.6–10.5)
CHLORIDE SERPL-SCNC: 104 MMOL/L (ref 98–107)
CHOLEST SERPL-MCNC: 155 MG/DL (ref 0–200)
CLARITY, POC: CLEAR
CO2 SERPL-SCNC: 26.6 MMOL/L (ref 22–29)
COLOR UR: YELLOW
CREAT SERPL-MCNC: 0.68 MG/DL (ref 0.57–1)
DEPRECATED RDW RBC AUTO: 39.1 FL (ref 37–54)
EGFRCR SERPLBLD CKD-EPI 2021: 101.7 ML/MIN/1.73
ERYTHROCYTE [DISTWIDTH] IN BLOOD BY AUTOMATED COUNT: 12.1 % (ref 12.3–15.4)
EXPIRATION DATE: NORMAL
GLOBULIN UR ELPH-MCNC: 2.3 GM/DL
GLUCOSE SERPL-MCNC: 75 MG/DL (ref 65–99)
GLUCOSE UR STRIP-MCNC: NEGATIVE MG/DL
HCT VFR BLD AUTO: 39.1 % (ref 34–46.6)
HDLC SERPL-MCNC: 82 MG/DL (ref 40–60)
HGB BLD-MCNC: 13.2 G/DL (ref 12–15.9)
KETONES UR QL: NEGATIVE
LDLC SERPL CALC-MCNC: 56 MG/DL (ref 0–100)
LDLC/HDLC SERPL: 0.66 {RATIO}
LEUKOCYTE EST, POC: NEGATIVE
Lab: NORMAL
MCH RBC QN AUTO: 29.2 PG (ref 26.6–33)
MCHC RBC AUTO-ENTMCNC: 33.8 G/DL (ref 31.5–35.7)
MCV RBC AUTO: 86.5 FL (ref 79–97)
NITRITE UR-MCNC: NEGATIVE MG/ML
PH UR: 6 [PH] (ref 5–8)
PLATELET # BLD AUTO: 263 10*3/MM3 (ref 140–450)
PMV BLD AUTO: 8.8 FL (ref 6–12)
POTASSIUM SERPL-SCNC: 4.5 MMOL/L (ref 3.5–5.2)
PROT SERPL-MCNC: 6.6 G/DL (ref 6–8.5)
PROT UR STRIP-MCNC: NEGATIVE MG/DL
RBC # BLD AUTO: 4.52 10*6/MM3 (ref 3.77–5.28)
RBC # UR STRIP: NEGATIVE /UL
SODIUM SERPL-SCNC: 140 MMOL/L (ref 136–145)
SP GR UR: 1.02 (ref 1–1.03)
TRIGL SERPL-MCNC: 93 MG/DL (ref 0–150)
UROBILINOGEN UR QL: NORMAL
VLDLC SERPL-MCNC: 17 MG/DL (ref 5–40)
WBC NRBC COR # BLD AUTO: 7.36 10*3/MM3 (ref 3.4–10.8)

## 2024-04-23 PROCEDURE — 82306 VITAMIN D 25 HYDROXY: CPT

## 2024-04-23 PROCEDURE — 99396 PREV VISIT EST AGE 40-64: CPT | Performed by: FAMILY MEDICINE

## 2024-04-23 PROCEDURE — 85027 COMPLETE CBC AUTOMATED: CPT

## 2024-04-23 PROCEDURE — 73562 X-RAY EXAM OF KNEE 3: CPT

## 2024-04-23 PROCEDURE — 36415 COLL VENOUS BLD VENIPUNCTURE: CPT

## 2024-04-23 PROCEDURE — 80061 LIPID PANEL: CPT

## 2024-04-23 PROCEDURE — 80053 COMPREHEN METABOLIC PANEL: CPT

## 2024-04-23 PROCEDURE — 99214 OFFICE O/P EST MOD 30 MIN: CPT | Performed by: FAMILY MEDICINE

## 2024-04-23 PROCEDURE — 81003 URINALYSIS AUTO W/O SCOPE: CPT | Performed by: FAMILY MEDICINE

## 2024-04-23 RX ORDER — BUSPIRONE HYDROCHLORIDE 10 MG/1
10 TABLET ORAL 2 TIMES DAILY
Qty: 60 TABLET | Refills: 5 | Status: SHIPPED | OUTPATIENT
Start: 2024-04-23

## 2024-04-23 RX ORDER — ESCITALOPRAM OXALATE 20 MG/1
20 TABLET ORAL DAILY
Qty: 90 TABLET | Refills: 1 | Status: SHIPPED | OUTPATIENT
Start: 2024-04-23

## 2024-04-23 NOTE — PROGRESS NOTES
Vanesa Esquivel presents to [facility].    Chief Complaint:  WWE and physical    Subjective     History of Present Illness:  HPI    Vanesa is in for her WWE, s/p hysterectomy, G4, P3, A1.  Pain with intercourse with vaginal dryness, decreased libido.  S/p hysterectomy and 1 oophrectomy due to ovarian cysts, and she has h/o abnormal Paps and positive HPV.  She has hemorrhoids.    Last COLONOSCOPY:  4.5.23 for EGD and colonoscopy  Last  EYE EXAM: 1-2 years ago  Last MAMMOGRAM: 10/17/2023-NORMAL  Last DEXA: 7/6/22-NORMAL  Last Dental EXAM:  recc every 6 months  ETOH use: RARELY  Tobacco use:  NEVER  DIET: healthy  EXERCISE: walks most days  She wears her seatbelt in the car  HEARING: tinnitus  Immunization: recc shingrix    H/O borderline elevated alkaline phosphatase at 124.      She c/o memory loss, sees neurology Graciela Helm and s/p MRI brain for memory loss, negative for findings.  Of note, she has a sister dx with brain tumor. B12 was normal.      She presents with chronic depression that is daily and ongoing, she c/o with home stressors including her not being happy in her current relationship/marriage.  She is actually coping well on lexapro 20 mg daily and buspar 5 mg bid and she doesn't want to adjust or add meds.  She has sleep apnea and didn't tolerate the CPAP.  She has no homicidal or suicidal ideation.   She has longstanding fibromyagia with fatigue.  She uses CBD oil which tends to help her more than anything.      She has chronic GERD, off dexilant, on pepcid 40 mg bid, has ongoing GERD symptoms, no esophagitis on EEGD, but gastritis was present.     Review of Systems:  Review of Systems   Constitutional:  Positive for fatigue. Negative for chills and fever.   HENT:  Negative for ear pain, sinus pressure and sore throat.    Eyes:  Negative for blurred vision and double vision.   Respiratory:  Positive for shortness of breath. Negative for cough and wheezing.   "  Cardiovascular:  Negative for chest pain, palpitations and leg swelling.   Gastrointestinal:  Positive for GERD. Negative for abdominal pain, blood in stool, constipation, diarrhea, nausea and vomiting.   Musculoskeletal:  Positive for arthralgias.   Skin:  Negative for rash.   Neurological:  Negative for dizziness and headache.   Psychiatric/Behavioral:  Positive for depressed mood and stress. Negative for sleep disturbance and suicidal ideas. The patient is nervous/anxious.         Objective     Medications:  Current Outpatient Medications on File Prior to Visit   Medication Sig    alclometasone (ACLOVATE) 0.05 % cream Apply  topically to the appropriate area as directed See Admin Instructions. apply topically to the appropriate area as directed 2 times a day    CBD (cannabidiol) oral oil Take  by mouth.    estradiol (Vagifem) 10 MCG tablet vaginal tablet Insert 1 tablet into the vagina 2 (Two) Times a Week.    famotidine (PEPCID) 40 MG tablet TAKE 1 TABLET BY MOUTH TWICE DAILY    meclizine (ANTIVERT) 12.5 MG tablet Take 1 tablet by mouth 3 (Three) Times a Day As Needed for Dizziness.    Multiple Vitamins-Minerals (EMERGEN-C IMMUNE PO) Take  by mouth.    [DISCONTINUED] busPIRone (BUSPAR) 10 MG tablet Take 1 tablet by mouth 2 (Two) Times a Day.    [DISCONTINUED] dexlansoprazole (DEXILANT) 60 MG capsule Take 1 capsule by mouth Daily.    [DISCONTINUED] escitalopram (LEXAPRO) 20 MG tablet Take 1 tablet by mouth Daily.     No current facility-administered medications on file prior to visit.       Vital Signs:   /71 (BP Location: Left arm, Patient Position: Sitting)   Pulse 79   Ht 160 cm (62.99\")   Wt 79.4 kg (175 lb)   SpO2 97%   BMI 31.01 kg/m²       Physical Exam:  Physical Exam  Constitutional:       General: She is not in acute distress.     Appearance: She is normal weight. She is not ill-appearing.   HENT:      Head: Normocephalic.      Right Ear: Tympanic membrane normal. There is no impacted " cerumen.      Left Ear: Tympanic membrane normal. There is no impacted cerumen.      Mouth/Throat:      Mouth: Mucous membranes are moist.      Pharynx: Oropharynx is clear.   Eyes:      Extraocular Movements: Extraocular movements intact.      Pupils: Pupils are equal, round, and reactive to light.   Neck:      Vascular: No carotid bruit.   Cardiovascular:      Rate and Rhythm: Normal rate and regular rhythm.      Pulses: Normal pulses.      Heart sounds: No murmur heard.  Pulmonary:      Effort: Pulmonary effort is normal.      Breath sounds: No wheezing, rhonchi or rales.   Chest:   Breasts:     Manpreet Score is 5.      Right: Normal.      Left: Normal.   Abdominal:      General: Bowel sounds are normal.      Palpations: Abdomen is soft.      Tenderness: There is no abdominal tenderness.      Hernia: There is no hernia in the left inguinal area or right inguinal area.   Genitourinary:     General: Normal vulva.      Exam position: Knee-chest position.      Labia:         Right: No rash, tenderness, lesion or injury.         Left: No rash, tenderness, lesion or injury.       Urethra: No prolapse, urethral pain or urethral lesion.      Vagina: Normal.      Uterus: Absent.       Adnexa: Right adnexa normal and left adnexa normal.      Rectum: Guaiac result negative. External hemorrhoid present. No mass, tenderness, anal fissure or internal hemorrhoid. Normal anal tone.       Musculoskeletal:         General: No swelling. Normal range of motion.      Cervical back: No rigidity or tenderness.      Left knee: Swelling, bony tenderness and crepitus present. No deformity, erythema, ecchymosis or lacerations. Normal range of motion. Tenderness present over the medial joint line. No LCL laxity, MCL laxity, ACL laxity or PCL laxity.Normal alignment, normal meniscus and normal patellar mobility. Normal pulse.      Instability Tests: Anterior drawer test negative.   Lymphadenopathy:      Cervical: No cervical adenopathy.       "Upper Body:      Right upper body: No axillary adenopathy.      Left upper body: No axillary adenopathy.      Lower Body: No right inguinal adenopathy. No left inguinal adenopathy.   Skin:     General: Skin is warm and dry.   Neurological:      Mental Status: She is alert.      Gait: Gait normal.   Psychiatric:         Mood and Affect: Mood normal.         Behavior: Behavior normal.         Judgment: Judgment normal.         Result Review   The following data was reviewed by Fina Bardales MD on 04/03/2023.  Lab Results   Component Value Date    WBC 8.22 02/14/2023    HGB 13.9 02/14/2023    HCT 39.9 02/14/2023    MCV 85.4 02/14/2023     02/14/2023     Lab Results   Component Value Date    GLUCOSE 73 02/14/2023    BUN 9 02/14/2023    CREATININE 0.72 02/14/2023    EGFR 98.3 02/14/2023    BCR 12.5 02/14/2023    K 4.2 02/14/2023    CO2 28.0 02/14/2023    CALCIUM 9.5 02/14/2023    ALBUMIN 4.7 02/14/2023    BILITOT 0.5 02/14/2023    AST 21 02/14/2023    ALT 15 02/14/2023     Lab Results   Component Value Date    CHOL 139 06/10/2022    CHLPL 169 04/01/2021    TRIG 85 06/10/2022    HDL 83 (H) 06/10/2022    LDL 40 06/10/2022     No results found for: \"TSH\"  No results found for: \"HGBA1C\"  No results found for: \"PSA\"               Assessment and Plan:       Diagnoses and all orders for this visit:    1. Annual physical exam (Primary)  -     POCT urinalysis dipstick, automated    2. Well woman exam  Comments:  Status post hysterectomy.  Pelvic exam and breast exam WNL.  No Pap performed    3. Encounter for screening mammogram for breast cancer  Comments:  Recommend yearly mammogram screening    4. Encounter for immunization  Comments:  Recommend Shingrix vaccine and she defers today    5. Colon cancer screening  Comments:  Colonoscopy reviewed and is up-to-date.  No polyps    6. Postmenopausal state  Comments:  DEXA is up-to-date.  It was normal 2022    7. Vitamin D deficiency, unspecified  Comments:  She is not " currently on vitamin D supplementation.  Will check vitamin D levels and assess need for supplementation  Orders:  -     Vitamin D,25-Hydroxy; Future    8. Chronic GERD  Comments:  Only on Pepcid 40 mg twice daily, she avoids spicy/acidic foods.Still has intermittent issues with GERD.  Recommend Carafate and she defers    9. Anxiety with depression  Comments:  Ongoing.  She would like counseling.I will set her up with [facility] for therapy.She is to continue BuSpar and Lexapro, she tolerates meds well.Denies SI/HI  Orders:  -     Ambulatory Referral to Psychology    10. Hormone replacement therapy (HRT)  Comments:  newly on estrogen pill    11. Fibromyalgia  Comments:  Chronic pain is stable and she is functional on lexapro.  Continue current meds  Orders:  -     busPIRone (BUSPAR) 10 MG tablet; Take 1 tablet by mouth 2 (Two) Times a Day.  Dispense: 60 tablet; Refill: 5  -     escitalopram (LEXAPRO) 20 MG tablet; Take 1 tablet by mouth Daily.  Dispense: 90 tablet; Refill: 1    12. Major depressive disorder, single episode, mild  Comments:  Stable and she is in a good place.  Continue Lexapro and BuSpar as directed  Orders:  -     busPIRone (BUSPAR) 10 MG tablet; Take 1 tablet by mouth 2 (Two) Times a Day.  Dispense: 60 tablet; Refill: 5  -     escitalopram (LEXAPRO) 20 MG tablet; Take 1 tablet by mouth Daily.  Dispense: 90 tablet; Refill: 1    13. Acute pain of left knee  Comments:  Will x-ray left knee for further eval.She has a superior patellar bursitis/chondromalacia patella/ medial joint line tenderness.  Start Voltaren gel topical  Orders:  -     XR Knee 3 View Left; Future    14. Encounter for screening for cardiovascular disorders  -     Comprehensive Metabolic Panel; Future  -     CBC (No Diff); Future  -     Lipid Panel; Future    Other orders  -     Diclofenac Sodium (VOLTAREN) 1 % gel gel; Apply 4 g topically to the appropriate area as directed 4 (Four) Times a Day As Needed (arthritis) for up to 30  days.  Dispense: 100 g; Refill: 2          Follow Up   Return in about 6 months (around 10/23/2024) for Recheck.           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    Other atopic dermatitis    Other pruritus    Other specified anxiety disorders    Overweight    Primary generalized hypertrophic osteoarthrosis    Sleep apnea    Tinnitus, bilateral    Vitamin D deficiency, unspecified     Past Surgical History:    COLONOSCOPY    Procedure: COLONOSCOPY;  Surgeon: Melanie Thomas MD;  Location: [facility];  Service: Gastroenterology;  Laterality: N/A;  HEMORRHOIDS    ENDOSCOPY    Procedure: ESOPHAGOGASTRODUODENOSCOPY WITH BIOPSIES;  Surgeon: Melanie Thomas MD;  Location: [facility];  Service: Gastroenterology;  Laterality: N/A;  GASTRITIS    HYSTERECTOMY    ABN PAP    MOUTH SURGERY    expanded palate    OVARIAN CYST SURGERY    Unsure which ovary    TUBAL ABDOMINAL LIGATION    VULVA BIOPSY    Dysplasia    WISDOM TOOTH EXTRACTION      Family History   Problem Relation Age of Onset    Stroke Father     Alcohol abuse Father     Diabetes Father     Hyperlipidemia Father     Alcohol abuse Mother     Heart disease Mother     Hypertension Mother     Cancer Paternal Grandfather     Stroke Paternal Grandmother     Parkinsonism Paternal Grandmother     Heart attack Maternal Grandmother     Cancer Maternal Grandfather     Breast cancer Neg Hx     Ovarian cancer Neg Hx     Uterine cancer Neg Hx     Colon  cancer Neg Hx     Prostate cancer Neg Hx      Social History     Tobacco Use    Smoking status: Never     Passive exposure: Never    Smokeless tobacco: Never   Substance Use Topics    Alcohol use: Not Currently       There are no preventive care reminders to display for this patient.       Immunization History   Administered Date(s) Administered    COVID-19 (MODERNA) 1st,2nd,3rd Dose Monovalent 02/12/2021, 03/12/2021    COVID-19 (MODERNA) Monovalent Original Booster 12/10/2021, 04/12/2022    COVID-19 F23 (PFIZER) 12YRS+ (COMIRNATY) 10/23/2023    Flu Vaccine Intradermal Quad 18-64YR 09/15/2020    Fluzone (or Fluarix & Flulaval for VFC) >6mos 10/27/2022, 10/23/2023    Influenza Injectable Mdck Pf Quad 09/15/2020    Influenza, Unspecified 09/17/2020, 10/27/2022    Tdap 06/02/2022       Allergies   Allergen Reactions    Carafate [Sucralfate] Other (See Comments)     Tightness around her throat, dry mouth

## 2024-04-25 DIAGNOSIS — M25.562 ACUTE PAIN OF LEFT KNEE: Primary | ICD-10-CM

## 2024-05-14 ENCOUNTER — OFFICE VISIT (OUTPATIENT)
Dept: ORTHOPEDIC SURGERY | Facility: CLINIC | Age: 59
End: 2024-05-14
Payer: COMMERCIAL

## 2024-05-14 VITALS
SYSTOLIC BLOOD PRESSURE: 109 MMHG | HEART RATE: 89 BPM | WEIGHT: 175 LBS | HEIGHT: 63 IN | BODY MASS INDEX: 31.01 KG/M2 | OXYGEN SATURATION: 98 % | DIASTOLIC BLOOD PRESSURE: 66 MMHG

## 2024-05-14 DIAGNOSIS — M17.12 OSTEOARTHRITIS OF LEFT KNEE, UNSPECIFIED OSTEOARTHRITIS TYPE: Primary | ICD-10-CM

## 2024-05-14 PROCEDURE — 99203 OFFICE O/P NEW LOW 30 MIN: CPT | Performed by: ORTHOPAEDIC SURGERY

## 2024-05-14 PROCEDURE — 20610 DRAIN/INJ JOINT/BURSA W/O US: CPT | Performed by: ORTHOPAEDIC SURGERY

## 2024-05-14 RX ORDER — TRIAMCINOLONE ACETONIDE 40 MG/ML
40 INJECTION, SUSPENSION INTRA-ARTICULAR; INTRAMUSCULAR
Status: COMPLETED | OUTPATIENT
Start: 2024-05-14 | End: 2024-05-14

## 2024-05-14 RX ORDER — LIDOCAINE HYDROCHLORIDE 10 MG/ML
5 INJECTION, SOLUTION INFILTRATION; PERINEURAL
Status: COMPLETED | OUTPATIENT
Start: 2024-05-14 | End: 2024-05-14

## 2024-05-14 RX ADMIN — LIDOCAINE HYDROCHLORIDE 5 ML: 10 INJECTION, SOLUTION INFILTRATION; PERINEURAL at 14:21

## 2024-05-14 RX ADMIN — TRIAMCINOLONE ACETONIDE 40 MG: 40 INJECTION, SUSPENSION INTRA-ARTICULAR; INTRAMUSCULAR at 14:21

## 2024-05-14 NOTE — PROGRESS NOTES
"Chief Complaint  Initial Evaluation of the Left Knee     Subjective      Vanesa Esquivel presents to [facility] for evaluation of the left knee. She reports left knee pain since March after a skiing trip. She denies a specific injury. She reports pain with kneeling. She reports pain when applying pressure.       Allergies   Allergen Reactions    Carafate [Sucralfate] Other (See Comments)     Tightness around her throat, dry mouth        Social History     Socioeconomic History    Marital status:     Number of children: 3   Tobacco Use    Smoking status: Never     Passive exposure: Never    Smokeless tobacco: Never   Vaping Use    Vaping status: Never Used   Substance and Sexual Activity    Alcohol use: Not Currently    Drug use: Never    Sexual activity: Yes     Partners: Male     Birth control/protection: Hysterectomy        I reviewed the patient's chief complaint, history of present illness, review of systems, past medical history, surgical history, family history, social history, medications, and allergy list.     Review of Systems     Constitutional: Denies fevers, chills, weight loss  Cardiovascular: Denies chest pain, shortness of breath  Skin: Denies rashes, acute skin changes  Neurologic: Denies headache, loss of consciousness  MSK: Left knee pain      Vital Signs:   /66   Pulse 89   Ht 160 cm (63\")   Wt 79.4 kg (175 lb)   SpO2 98%   BMI 31.00 kg/m²          Physical Exam  General: Alert. No acute distress    Ortho Exam      Left knee- Positive EHL, FHL, GS and TA. Sensation intact to all 5 nerves of the foot. Positive pulses. Stable to varus/valgus stress. Stable to anterior/posterior drawer. Negative Lachman's. Negative Yuval's. Full extension. Flexion 125 degrees. Knee Extensor Mechanism  intact. Positive crepitus.    Left knee: L knee  Date/Time: 5/14/2024 2:21 PM  Consent given by: patient  Site marked: site marked  Timeout: Immediately prior to procedure " a time out was called to verify the correct patient, procedure, equipment, support staff and site/side marked as required   Supporting Documentation  Indications: pain   Procedure Details  Location: knee - L knee  Needle gauge: 21G.  Medications administered: 5 mL lidocaine 1 %; 40 mg triamcinolone acetonide 40 MG/ML  Patient tolerance: patient tolerated the procedure well with no immediate complications      This injection documentation was Scribed for Akin Vaughn MD by Fiona Thornton.  05/14/24   14:21 EDT          Imaging Results (Most Recent)       None             Result Review :       XR Knee 3 View Left    Result Date: 4/24/2024  Narrative: DATE OF EXAM: 4/23/2024 3:47 PM  PROCEDURE: XR KNEE 3 VW LEFT-  INDICATIONS: left knee pain s/p skiing; M25.562-Pain in left knee  COMPARISON: No Comparisons Available  TECHNIQUE: 3 views of the left knee were obtained.  FINDINGS: No fracture is identified. Mineralization and osseous alignment appear within normal limits. Soft tissues appear unremarkable.  No joint effusion is identified. Minimal osteophyte formation joint spaces appear grossly preserved.      Impression: No radiographic findings of acute osseous abnormality.   Electronically Signed By-Tip Nicole MD On:4/24/2024 8:33 AM              Assessment and Plan     Diagnoses and all orders for this visit:    1. Osteoarthritis of left knee, unspecified osteoarthritis type (Primary)        Discussed the treatment plan with the patient.  I reviewed the recent x-rays with the patient. Discussed the risks and benefits of a left knee steroid injection. The patient expressed understanding and wished to proceed. She tolerated the injection well. Home exercises given today. May consider MRI of the left knee to evaluate for internal derangement if symptoms persist.     Call or return if worsening symptoms.    Follow Up   6 weeks      Patient was given instructions and counseling regarding her condition or for  health maintenance advice. Please see specific information pulled into the AVS if appropriate.     Scribed for Akin Vaughn MD by Ale Rockwell.  05/14/24   13:20 EDT    I have personally performed the services described in this document as scribed by the above individual and it is both accurate and complete. Akin Vaughn MD 05/14/24

## 2024-05-22 ENCOUNTER — TELEPHONE (OUTPATIENT)
Dept: OBSTETRICS AND GYNECOLOGY | Facility: CLINIC | Age: 59
End: 2024-05-22
Payer: COMMERCIAL

## 2024-06-03 ENCOUNTER — TELEPHONE (OUTPATIENT)
Dept: GASTROENTEROLOGY | Facility: CLINIC | Age: 59
End: 2024-06-03

## 2024-06-03 NOTE — TELEPHONE ENCOUNTER
Patient has not contacted the office back so I have cancelled the appointment and sent out a letter.

## 2024-06-03 NOTE — TELEPHONE ENCOUNTER
Attempted to contact the patient in regards to getting her appointment for today at 11:30 am with XIAO Saucedo rescheduled due to the provider being out of the office. Patient did not answer so I have left a voicemail requesting a call back to get her rescheduled.     HUB DO NOT RESCHEDULE, WARM TRANSFER TO THE OFFICE.

## 2024-06-22 RX ORDER — DEXLANSOPRAZOLE 60 MG/1
60 CAPSULE, DELAYED RELEASE ORAL DAILY
Qty: 90 CAPSULE | Refills: 1 | Status: SHIPPED | OUTPATIENT
Start: 2024-06-22

## 2024-06-25 ENCOUNTER — OFFICE VISIT (OUTPATIENT)
Dept: ORTHOPEDIC SURGERY | Facility: CLINIC | Age: 59
End: 2024-06-25
Payer: COMMERCIAL

## 2024-06-25 VITALS
BODY MASS INDEX: 30.39 KG/M2 | WEIGHT: 178 LBS | HEIGHT: 64 IN | HEART RATE: 97 BPM | OXYGEN SATURATION: 97 % | DIASTOLIC BLOOD PRESSURE: 68 MMHG | SYSTOLIC BLOOD PRESSURE: 112 MMHG

## 2024-06-25 DIAGNOSIS — M17.12 OSTEOARTHRITIS OF LEFT KNEE, UNSPECIFIED OSTEOARTHRITIS TYPE: Primary | ICD-10-CM

## 2024-06-25 DIAGNOSIS — S89.92XA INJURY OF LEFT KNEE, INITIAL ENCOUNTER: ICD-10-CM

## 2024-06-25 PROCEDURE — 99213 OFFICE O/P EST LOW 20 MIN: CPT | Performed by: ORTHOPAEDIC SURGERY

## 2024-06-25 NOTE — PROGRESS NOTES
"Chief Complaint  Follow-up of the Left Knee     Subjective      Vanesa Esquivel presents to [facility] for a follow up for her left knee. She has been treating her left knee osteoarthritis conservatively with injections. She was last seen in the office on 05/14/24 where she received a left knee steroid injection. She reports this gave her great relief. She is not having any pain today in the office today. She reports some cracking and popping to her knee. She states she really noticed the pain to her knee after she went skiing back in March.     Allergies   Allergen Reactions    Carafate [Sucralfate] Other (See Comments)     Tightness around her throat, dry mouth        Social History     Socioeconomic History    Marital status:     Number of children: 3   Tobacco Use    Smoking status: Never     Passive exposure: Never    Smokeless tobacco: Never   Vaping Use    Vaping status: Never Used   Substance and Sexual Activity    Alcohol use: Not Currently    Drug use: Never    Sexual activity: Yes     Partners: Male     Birth control/protection: Hysterectomy        I reviewed the patient's chief complaint, history of present illness, review of systems, past medical history, surgical history, family history, social history, medications, and allergy list.     Review of Systems     Constitutional: Denies fevers, chills, weight loss  Cardiovascular: Denies chest pain, shortness of breath  Skin: Denies rashes, acute skin changes  Neurologic: Denies headache, loss of consciousness  MSK: Left knee pain       Vital Signs:   /68   Pulse 97   Ht 162.6 cm (64\")   Wt 80.7 kg (178 lb)   SpO2 97%   BMI 30.55 kg/m²            Ortho Exam    Physical Exam  General:Alert. No acute distress   Left lower extremity:Positive EHL, FHL, GS and TA. Sensation intact to all 5 nerves of the foot. Positive pulses. Stable to varus/valgus stress. Stable to anterior/posterior drawer. Negative Lachman's. " Negative Yuval's. Full extension. Flexion 125 degrees. Knee Extensor Mechanism intact. Positive crepitus , mild swelling, no effusion, mild tenderness. Calf soft, neurovascularly intact     Procedures    Imaging Results (Most Recent)       None             Result Review :       No results found.           Assessment and Plan     Diagnoses and all orders for this visit:    1. Osteoarthritis of left knee, unspecified osteoarthritis type (Primary)    2. Injury of left knee, initial encounter      The patient presents here today for a follow up for her left knee.     She will continue activities as tolerated, home exercises and over the counter medications for pain control.     Advised patient that she can get repeat injections every three months for steroid injections.      MRI order placed today to evaluate for internal derangement.     Call or return if worsening symptoms.    Follow Up     After MRI       Patient was given instructions and counseling regarding her condition or for health maintenance advice. Please see specific information pulled into the AVS if appropriate.     Scribed for Akin Vaughn MD by Fiona Thornton.  06/25/24   13:21 EDT    I have personally performed the services described in this document as scribed by the above individual and it is both accurate and complete. Akin Vaughn MD 06/25/24

## 2024-07-15 ENCOUNTER — HOSPITAL ENCOUNTER (OUTPATIENT)
Dept: MRI IMAGING | Facility: HOSPITAL | Age: 59
Discharge: HOME | End: 2024-07-15
Admitting: ORTHOPAEDIC SURGERY
Payer: COMMERCIAL

## 2024-07-15 DIAGNOSIS — M17.12 OSTEOARTHRITIS OF LEFT KNEE, UNSPECIFIED OSTEOARTHRITIS TYPE: ICD-10-CM

## 2024-07-15 DIAGNOSIS — S89.92XA INJURY OF LEFT KNEE, INITIAL ENCOUNTER: ICD-10-CM

## 2024-07-15 PROCEDURE — 73721 MRI JNT OF LWR EXTRE W/O DYE: CPT

## 2024-07-16 ENCOUNTER — TELEPHONE (OUTPATIENT)
Dept: ORTHOPEDIC SURGERY | Facility: CLINIC | Age: 59
End: 2024-07-16
Payer: COMMERCIAL

## 2024-08-13 ENCOUNTER — OFFICE VISIT (OUTPATIENT)
Dept: ORTHOPEDIC SURGERY | Facility: CLINIC | Age: 59
End: 2024-08-13
Payer: COMMERCIAL

## 2024-08-13 VITALS
DIASTOLIC BLOOD PRESSURE: 76 MMHG | HEIGHT: 64 IN | HEART RATE: 97 BPM | OXYGEN SATURATION: 96 % | SYSTOLIC BLOOD PRESSURE: 118 MMHG | WEIGHT: 178 LBS | BODY MASS INDEX: 30.39 KG/M2

## 2024-08-13 DIAGNOSIS — M17.12 OSTEOARTHRITIS OF LEFT KNEE, UNSPECIFIED OSTEOARTHRITIS TYPE: Primary | ICD-10-CM

## 2024-08-13 DIAGNOSIS — M94.262 CHONDROMALACIA OF LEFT KNEE: ICD-10-CM

## 2024-08-13 RX ADMIN — LIDOCAINE HYDROCHLORIDE 5 ML: 10 INJECTION, SOLUTION INFILTRATION; PERINEURAL at 13:54

## 2024-08-13 RX ADMIN — TRIAMCINOLONE ACETONIDE 40 MG: 40 INJECTION, SUSPENSION INTRA-ARTICULAR; INTRAMUSCULAR at 13:54

## 2024-08-13 NOTE — PROGRESS NOTES
"Chief Complaint  Follow-up and Pain of the Left Knee       Subjective      Vanesa Esquivel presents to [facility] for a follow up for her left knee. She was last seen in the office on 06/25/24 where we ordered an MRI on her left knee. She presents to the office today for these results. She has been treating her left knee osteoarthritis conservatively with injections and her last one was on 05/14/24. She reports the injection gave her great relief but states she is still having occasional popping and cracking to her knee.     Allergies   Allergen Reactions    Carafate [Sucralfate] Other (See Comments)     Tightness around her throat, dry mouth        Social History     Socioeconomic History    Marital status:     Number of children: 3   Tobacco Use    Smoking status: Never     Passive exposure: Never    Smokeless tobacco: Never   Vaping Use    Vaping status: Never Used   Substance and Sexual Activity    Alcohol use: Not Currently    Drug use: Never    Sexual activity: Yes     Partners: Male     Birth control/protection: Hysterectomy        I reviewed the patient's chief complaint, history of present illness, review of systems, past medical history, surgical history, family history, social history, medications, and allergy list.     Review of Systems     Constitutional: Denies fevers, chills, weight loss  Cardiovascular: Denies chest pain, shortness of breath  Skin: Denies rashes, acute skin changes  Neurologic: Denies headache, loss of consciousness  MSK: Left knee pain       Vital Signs:   /76   Pulse 97   Ht 162.6 cm (64\")   Wt 80.7 kg (178 lb)   SpO2 96%   BMI 30.55 kg/m²            Ortho Exam    Physical Exam  General:Alert. No acute distress     Left lower extremity: Positive EHL, FHL, GS and TA. Sensation intact to all 5 nerves of the foot. Positive pulses. Stable to varus/valgus stress. Stable to anterior/posterior drawer. Negative Lachman's. Negative Yuval's. " Full extension. Flexion 125 degrees. Knee Extensor Mechanism intact. Positive crepitus , mild swelling, no effusion, mild tenderness. Calf soft, neurovascularly intact      Left knee: L knee  Date/Time: 8/13/2024 1:54 PM  Consent given by: patient  Site marked: site marked  Timeout: Immediately prior to procedure a time out was called to verify the correct patient, procedure, equipment, support staff and site/side marked as required   Supporting Documentation  Indications: pain   Procedure Details  Location: knee - L knee  Needle gauge: 21 G.  Medications administered: 5 mL lidocaine 1 %; 40 mg triamcinolone acetonide 40 MG/ML  Patient tolerance: patient tolerated the procedure well with no immediate complications      This injection documentation was Scribed for Akin Vaughn MD by Tabatha Colorado MA.  08/13/24   13:55 EDT     Imaging Results (Most Recent)       None             Result Review :       MRI Knee Left Without Contrast    Result Date: 7/15/2024  Narrative: MRI KNEE LEFT  WO CONTRAST Date of Exam: 7/15/2024 1:21 PM EDT Indication: Left knee pain. Reported injury March 2024.  Comparison: Knee radiograph 4/23/2024 Technique:  Routine multiplanar/multisequence images of the left knee were obtained without contrast administration. Findings: Osseous Structures and Intra-Articular Bone marrow signal intensity is normal. No osseous contusions or fractures. No significant joint effusion. No intra-articular loose bodies. Ligaments The anterior cruciate ligament and posterior cruciate ligaments are intact. Medial collateral ligament and lateral collateral ligament complex are intact. Menisci No meniscal tears. Extensor compartment Patella has anatomic position. Extensor mechanism is intact. Cartilage No significant cartilage defects are identified. Mild cartilage signal alteration of the patellofemoral compartment. Soft tissues No soft tissue masses. Small Baker cyst without evidence of recent Baker  cyst rupture. Miscellaneous Iliotibial band is normal. Popliteus muscle and tendon are normal in appearance.     Impression: Impression: Mild chondromalacia patellofemoral compartment. Small Baker's cyst. Electronically Signed: Olga García MD  7/15/2024 3:41 PM EDT  Workstation ID: [id number]            Assessment and Plan     Diagnoses and all orders for this visit:    1. Osteoarthritis of left knee, unspecified osteoarthritis type (Primary)    2. Chondromalacia of left knee        The patient presents here today for a follow up for her left knee. MRI results were reviewed with the patient today in the office.     Discussed the risks and benefits of a left knee steroid injection today in the office. Patient expressed understanding and wishes to proceed. She tolerated the injection well and without any complications.     Order placed today for physical therapy and will continue over the counter medications.     Call or return if worsening symptoms.    Follow Up     3 months     Patient was given instructions and counseling regarding her condition or for health maintenance advice. Please see specific information pulled into the AVS if appropriate.     Scribed for Akin Vaughn MD by Fiona Thornton.  08/13/24   13:30 EDT    I have personally performed the services described in this document as scribed by the above individual and it is both accurate and complete. Akin Vaughn MD 08/14/24

## 2024-08-14 RX ORDER — TRIAMCINOLONE ACETONIDE 40 MG/ML
40 INJECTION, SUSPENSION INTRA-ARTICULAR; INTRAMUSCULAR
Status: COMPLETED | OUTPATIENT
Start: 2024-08-13 | End: 2024-08-13

## 2024-08-14 RX ORDER — LIDOCAINE HYDROCHLORIDE 10 MG/ML
5 INJECTION, SOLUTION INFILTRATION; PERINEURAL
Status: COMPLETED | OUTPATIENT
Start: 2024-08-13 | End: 2024-08-13

## 2024-09-05 ENCOUNTER — TREATMENT (OUTPATIENT)
Dept: PHYSICAL THERAPY | Facility: CLINIC | Age: 59
End: 2024-09-05
Payer: COMMERCIAL

## 2024-09-05 DIAGNOSIS — M94.262 CHONDROMALACIA OF KNEE, LEFT: ICD-10-CM

## 2024-09-05 DIAGNOSIS — M25.562 CHRONIC PAIN OF LEFT KNEE: Primary | ICD-10-CM

## 2024-09-05 DIAGNOSIS — M17.12 OSTEOARTHRITIS OF LEFT KNEE, UNSPECIFIED OSTEOARTHRITIS TYPE: ICD-10-CM

## 2024-09-05 DIAGNOSIS — G89.29 CHRONIC PAIN OF LEFT KNEE: Primary | ICD-10-CM

## 2024-09-05 DIAGNOSIS — Z74.09 IMPAIRED FUNCTIONAL MOBILITY AND ACTIVITY TOLERANCE: ICD-10-CM

## 2024-09-05 NOTE — PROGRESS NOTES
"    Physical Therapy Initial Evaluation and Plan of Care    [address] [facility]  [address]  [facility] [address]  [phone]  Patient: Vanesa Esquivel   : 1965  Diagnosis/ICD-10 Code:  Chronic pain of left knee [M25.562, G89.29]  Referring practitioner: Akin Vaughn MD  Date of Initial Visit: 2024  Today's Date: 2024  Patient seen for 1 session         Visit Diagnoses:    ICD-10-CM ICD-9-CM   1. Chronic pain of left knee  M25.562 719.46    G89.29 338.29   2. Osteoarthritis of left knee, unspecified osteoarthritis type  M17.12 715.96   3. Chondromalacia of knee, left  M94.262 717.7   4. Impaired functional mobility and activity tolerance  Z74.09 V49.89         Subjective Questionnaire: WOMAC - 37.5%      Subjective Evaluation    History of Present Illness  Mechanism of injury: Gradual weakening of left side over time.  Went skiing in March and started having a lot of pain.  Cortisone injection x 2 with some help.  Pulling sensation back of knee and pain outerside (lateral) and below kneecap.  Some swelling? Top/above knee.  MRI with no tears but OA and chondromalacia patella. Sees chiropractor for neck and back - feels \"out of whack\".  No other relevant PMH other than fibromyalgia      Patient Occupation: retired Pain  Current pain ratin  At worst pain rating: 10  Quality: burning and sharp  Relieving factors: ice, medications and rest  Aggravating factors: squatting, stairs, prolonged positioning and ambulation  Progression: improved    Social Support  Lives in: one-story house    Diagnostic Tests  MRI studies: abnormal    Treatments  Previous treatment: injection treatment and medication (OTC meds)  Current treatment: physical therapy  Patient Goals  Patient goals for therapy: decreased pain, return to sport/leisure activities and increased strength             Objective          Observations     Additional Knee Observation Details  Posture - left shoulder and iliac crest elevated; mild " edema suprapatella LvsR    Tenderness     Additional Tenderness Details  No TTP    Active Range of Motion   Left Knee   Flexion: 130 (tight) degrees   Extension: 0 degrees     Right Knee   Flexion: 135 degrees   Extension: 0 degrees     Patellar Mobility   Left Knee Patellar tendons within functional limits include the medial, lateral, superior and inferior.     Additional Patellar Mobility Details  With crepitus    Strength/Myotome Testing     Left Hip   Planes of Motion   Flexion: 4-  Abduction: 4+  Adduction: 4+  External rotation: 4-    Right Hip   Planes of Motion   Flexion: 4-  Abduction: 4+  Adduction: 4+  External rotation: 4+    Left Knee   Flexion: 4  Extension: 4  Quadriceps contraction: fair    Right Knee   Flexion: 5  Extension: 5  Quadriceps contraction: good    Tests     Left Hip   Positive Serge Morris: Positive.     Left Knee Flexibility Comments:   Mild HS tight B; mod quad and hip flexor tightness L; ITB tightness    Ambulation     Ambulation: Level Surfaces   Ambulation without assistive device: independent    Additional Level Surfaces Ambulation Details  No notable antalgia with brief walk          Assessment & Plan       Assessment  Impairments: abnormal or restricted ROM, activity intolerance, impaired physical strength, lacks appropriate home exercise program and pain with function   Functional limitations: walking and stooping (Stairs; prolonged postioning)  Assessment details: Vanesa Esquivel is 59 y.o. female who presents today to Physical Therapy for chronic left knee pain improved recently from an injection but still with feeling of weakness and tightness and with some swelling.  MRI confirmed OA and chondromalacia without lig or meniscal tears. Presents with moderate quad and ITB tightness and mild HS and hip flexor tightness and moderate HS and hip weakness.  Patient would benefit from skilled physical therapy to address functional limitations and impairments to improve quality of  life.  Prognosis: good    Goals  Plan Goals: KNEE PROBLEMS:     1. The patient has limited flexibility and ROM of the left knee.   LTG 1: 6 weeks:  The patient will demonstrate 0 to 135 degrees of ROM for the left knee in order to allow patient to perform functional squat for ADLs.    STATUS:  New   STG 1a: 3 weeks:  The patient will show improvement in mm flexibility with compliance with exercise program  STATUS:  New    2. The patient has limited strength of the left knee and hip.   LTG 2: 6 weeks: The patient will demonstrate 4+ /5 strength for left knee and hip in order to allow patient improved joint stability    STATUS:  New   STG 2a: 3 weeks: The patient will tolerate progression of strengthening exercises/activities  STATUS:  New      3. The patient has left knee pain   LTG 3: 6 weeks:  The patient will report pain in left knee no > 3/10 with ADLs    STATUS:  New   STG 3a: 3 weeks  The patient will report pain in the left knee no > 6/10 with ADLs    STATUS:  New    4. Mobility: Walking/Moving Around Functional Limitation     LTG 4: 6 weeks weeks:  The patient will demonstrate 20 % limitation per score on WOMAC.    STATUS:  New   STG 4 a: 3 weeks:  The patient will demonstrate 30 % limitation per score on WOMAC  STATUS:  New     Plan  Therapy options: will be seen for skilled therapy services  Planned modality interventions: cryotherapy  Planned therapy interventions: therapeutic activities, stretching, strengthening, home exercise program, gait training, functional ROM exercises and neuromuscular re-education  Frequency: 1x week  Duration in weeks: 6  Treatment plan discussed with: patient        History # of Personal Factors and/or Comorbidities: MODERATE (1-2)  Examination of Body System(s): # of elements: LOW (1-2)  Clinical Presentation: STABLE   Clinical Decision Making: LOW       Timed:         Manual Therapy:    0     mins  03105;     Therapeutic Exercise:    10     mins  15240;     Neuromuscular  Lexx:    0    mins  17014;    Therapeutic Activity:     5     mins  25321;     Gait Trainin     mins  75592;     Ultrasound:     0     mins  13174;    Ionto                               0    mins   81940  Self Care                       0     mins   88389      Un-Timed:  Electrical Stimulation:    0     mins  82000 ( );  Dry Needling     0     mins self-pay  Traction     0     mins 80084  Low Eval     25     Mins  62050  Mod Eval     0     Mins  82377  High Eval                       0     Mins  45595  Canalith Repos    0     mins 70661      Timed Treatment:   15   mins NC  Total Treatment:     40   mins          PT: Lolis Fields PT     License Number: [id number]  Electronically signed by Lolis Fields PT, 24, 2:37 PM EDT    Certification Period: 2024 thru 12/3/2024  I certify that the therapy services are furnished while this patient is under my care.  The services outlined above are required by this patient, and will be reviewed every 90 days.         Physician Signature:__________________________________________________    PHYSICIAN: Akin Vaughn MD  NPI: [id number]                                      DATE:      Please sign and return via fax to [email or web address] . Thank you, [facility].

## 2024-09-13 ENCOUNTER — TREATMENT (OUTPATIENT)
Dept: PHYSICAL THERAPY | Facility: CLINIC | Age: 59
End: 2024-09-13
Payer: COMMERCIAL

## 2024-09-13 DIAGNOSIS — M94.262 CHONDROMALACIA OF KNEE, LEFT: ICD-10-CM

## 2024-09-13 DIAGNOSIS — M25.562 CHRONIC PAIN OF LEFT KNEE: Primary | ICD-10-CM

## 2024-09-13 DIAGNOSIS — G89.29 CHRONIC PAIN OF LEFT KNEE: Primary | ICD-10-CM

## 2024-09-13 DIAGNOSIS — Z74.09 IMPAIRED FUNCTIONAL MOBILITY AND ACTIVITY TOLERANCE: ICD-10-CM

## 2024-09-13 DIAGNOSIS — M17.12 OSTEOARTHRITIS OF LEFT KNEE, UNSPECIFIED OSTEOARTHRITIS TYPE: ICD-10-CM

## 2024-09-13 NOTE — PROGRESS NOTES
Physical Therapy Daily Treatment Note      Patient: Vanesa Esquivel   : 1965  Referring practitioner: Akin Vaughn MD  Date of Initial Visit: Type: THERAPY  Noted: 2024  Today's Date: 2024  Patient seen for 2 sessions           Subjective Evaluation    History of Present Illness    Subjective comment: -2/10 in the L knee       Objective   See Exercise, Manual, and Modality Logs for complete treatment.       Assessment & Plan       Assessment  Impairments: abnormal or restricted ROM, activity intolerance, impaired physical strength, lacks appropriate home exercise program and pain with function   Functional limitations: walking and stooping (Stairs; prolonged postioning)  Assessment details: Pt required vc and demonstration of all exercises due to this being her first tx visit. She has low pain and was able to perform all activities without issue. There is slight deficits with balance and more with general strength.  Prognosis: good    Goals  Plan Goals: KNEE PROBLEMS:     1. The patient has limited flexibility and ROM of the left knee.   LTG 1: 6 weeks:  The patient will demonstrate 0 to 135 degrees of ROM for the left knee in order to allow patient to perform functional squat for ADLs.    STATUS:  Progressing   STG 1a: 3 weeks:  The patient will show improvement in mm flexibility with compliance with exercise program  STATUS:  Progressing    2. The patient has limited strength of the left knee and hip.   LTG 2: 6 weeks: The patient will demonstrate 4+ /5 strength for left knee and hip in order to allow patient improved joint stability    STATUS:  Progressing   STG 2a: 3 weeks: The patient will tolerate progression of strengthening exercises/activities  STATUS:  Progressing      3. The patient has left knee pain   LTG 3: 6 weeks:  The patient will report pain in left knee no > 3/10 with ADLs    STATUS:  Progressing   STG 3a: 3 weeks  The patient will report pain in the left knee no > 6/10 with  ADLs    STATUS:  Progressing    4. Mobility: Walking/Moving Around Functional Limitation     LTG 4: 6 weeks weeks:  The patient will demonstrate 20 % limitation per score on WOMAC.    STATUS:  Progressing   STG 4 a: 3 weeks:  The patient will demonstrate 30 % limitation per score on WOMAC  STATUS:  Progressing     Plan  Therapy options: will be seen for skilled therapy services  Planned modality interventions: cryotherapy  Planned therapy interventions: therapeutic activities, stretching, strengthening, home exercise program, gait training, functional ROM exercises and neuromuscular re-education  Frequency: 1x week  Duration in weeks: 6  Treatment plan discussed with: patient          Visit Diagnoses:    ICD-10-CM ICD-9-CM   1. Chronic pain of left knee  M25.562 719.46    G89.29 338.29   2. Osteoarthritis of left knee, unspecified osteoarthritis type  M17.12 715.96   3. Chondromalacia of knee, left  M94.262 717.7   4. Impaired functional mobility and activity tolerance  Z74.09 V49.89       Progress per Plan of Care    Timed:    Therapeutic Exercise:    10     mins  79348;  Manual Therapy:         mins  12076;     Neuromuscular Lexx:   10    mins  12942;    Therapeutic Activity:     10     mins  73092;     Gait Training:           mins  68740;     Ultrasound:          mins  84601;      Untimed:  Electrical Stimulation:         mins  69947 ( );  Mechanical Traction:         mins  83531;   Group           mins  15353    Timed Treatment:   30   mins   Total Treatment:     32   mins      Erica Myles PTA  Physical Therapist Assistant

## 2024-09-17 ENCOUNTER — TELEPHONE (OUTPATIENT)
Dept: FAMILY MEDICINE CLINIC | Age: 59
End: 2024-09-17
Payer: COMMERCIAL

## 2024-10-07 ENCOUNTER — OFFICE VISIT (OUTPATIENT)
Dept: GASTROENTEROLOGY | Facility: CLINIC | Age: 59
End: 2024-10-07
Payer: COMMERCIAL

## 2024-10-07 VITALS
SYSTOLIC BLOOD PRESSURE: 126 MMHG | HEIGHT: 64 IN | HEART RATE: 84 BPM | OXYGEN SATURATION: 98 % | WEIGHT: 174 LBS | BODY MASS INDEX: 29.71 KG/M2 | DIASTOLIC BLOOD PRESSURE: 65 MMHG

## 2024-10-07 DIAGNOSIS — R13.10 DYSPHAGIA, UNSPECIFIED TYPE: ICD-10-CM

## 2024-10-07 DIAGNOSIS — K21.00 GASTROESOPHAGEAL REFLUX DISEASE WITH ESOPHAGITIS WITHOUT HEMORRHAGE: Primary | ICD-10-CM

## 2024-10-07 DIAGNOSIS — Z87.19 HISTORY OF GASTRITIS: ICD-10-CM

## 2024-10-07 PROCEDURE — 99214 OFFICE O/P EST MOD 30 MIN: CPT | Performed by: NURSE PRACTITIONER

## 2024-10-07 NOTE — PROGRESS NOTES
Chief Complaint   Follow-up (GERD )    History of Present Illness       Vanesa Esquivel is a 59 y.o. female who presents to [facility] for follow-up for GERD. She was last seen in the office by me on 11/27/2023.    Underwent EGD and colonoscopy with Dr. Thomas on 4/5/2023.  EGD showed gastritis.  Colonoscopy showed internal and external hemorrhoids otherwise normal exam.  Path positive for mild chronic inactive gastritis and erosions.  Reflux esophagitis.  Repeat colonoscopy in 5 years for surveillance.     Has history of GERD/gastritis/esophagitis and is on Nexium 40 mg every night and Pepcid 40 mg every morning as needed.Still having breakthrough reflux and will have trouble swallowing. Worse with pepper.      Will have loose and soft stools with fecal urgency. Has had fecal incontinence.  Normal abd US and HIDA scan last fall.  Feels like she has a nervous stomach and admits that she has been trying to wean herself off the BuSpar.  She reports it does help so she is wondering if maybe she should not get back on it.     GI family history--maternal grandfather with liver cancer. Paternal grandfather ?liver cancer.         She is happy to report reflux is better on dexilant. No longer needing pepcid. Bowels back to normal. Still on probiotics every once in a while. Doing much better now.   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     Other atopic dermatitis     Other pruritus     Other specified anxiety disorders     Overweight     Primary generalized hypertrophic osteoarthrosis     Sleep apnea 05/01/2021    Tinnitus, bilateral     Vitamin D deficiency, unspecified        Past Surgical History:   Procedure Laterality Date    COLONOSCOPY N/A 04/05/2023    Procedure: COLONOSCOPY;  Surgeon: Melanie Thomas MD;  Location: [facility];  Service: Gastroenterology;  Laterality: N/A;  HEMORRHOIDS    ENDOSCOPY N/A 04/05/2023    Procedure: ESOPHAGOGASTRODUODENOSCOPY WITH BIOPSIES;  Surgeon: Melanie Thomas MD;  Location: [facility] ENDOSCOPY;  Service: Gastroenterology;  Laterality: N/A;  GASTRITIS    HYSTERECTOMY  2002    ABN PAP    MOUTH SURGERY      expanded palate    OVARIAN CYST SURGERY      Unsure which ovary    TUBAL ABDOMINAL LIGATION  11/22/1992    VULVA BIOPSY      Dysplasia    WISDOM TOOTH EXTRACTION           Current Outpatient Medications:     alclometasone (ACLOVATE) 0.05 % cream, Apply  topically to the appropriate area as directed See Admin Instructions. apply topically to the appropriate area as directed 2 times a day, Disp: 45 g, Rfl: 1    busPIRone (BUSPAR) 10 MG tablet, Take 1 tablet by mouth 2 (Two) Times a Day., Disp: 60 tablet, Rfl: 5    CBD (cannabidiol) oral oil, Take  by mouth., Disp: , Rfl:     dexlansoprazole (DEXILANT) 60 MG capsule, Take 1 capsule by mouth Daily., Disp: 90 capsule, Rfl: 1    escitalopram (LEXAPRO) 20 MG tablet, Take 1 tablet by mouth Daily., Disp: 90 tablet, Rfl: 1    estradiol (Vagifem) 10 MCG tablet vaginal tablet, Insert 1 tablet into the vagina 2 (Two) Times a Week., Disp: 8 tablet, Rfl: 11    Multiple Vitamins-Minerals (EMERGEN-C IMMUNE PO), Take  by mouth., Disp: , Rfl:     famotidine (PEPCID) 40 MG tablet, TAKE 1 TABLET BY MOUTH TWICE DAILY (Patient not taking: Reported on 10/7/2024), Disp: 180 tablet, Rfl: 3    meclizine (ANTIVERT) 12.5 MG  "tablet, Take 1 tablet by mouth 3 (Three) Times a Day As Needed for Dizziness. (Patient not taking: Reported on 10/7/2024), Disp: 30 tablet, Rfl: 0     Allergies   Allergen Reactions    Carafate [Sucralfate] Other (See Comments)     Tightness around her throat, dry mouth       Family History   Problem Relation Age of Onset    Stroke Father     Alcohol abuse Father     Diabetes Father     Hyperlipidemia Father     Alcohol abuse Mother     Heart disease Mother     Hypertension Mother     Cancer Paternal Grandfather     Stroke Paternal Grandmother     Parkinsonism Paternal Grandmother     Heart attack Maternal Grandmother     Cancer Maternal Grandfather     Breast cancer Neg Hx     Ovarian cancer Neg Hx     Uterine cancer Neg Hx     Colon cancer Neg Hx     Prostate cancer Neg Hx         Social History     Social History Narrative    Not on file       Objective       Review of Systems   Constitutional:  Negative for appetite change, fatigue, fever, unexpected weight gain and unexpected weight loss.   HENT:  Negative for trouble swallowing.    Respiratory:  Negative for cough, choking, chest tightness, shortness of breath, wheezing and stridor.    Cardiovascular:  Negative for chest pain, palpitations and leg swelling.   Gastrointestinal:  Negative for abdominal distention, abdominal pain, anal bleeding, blood in stool, constipation, diarrhea, nausea, rectal pain, vomiting, GERD and indigestion.        Vital Signs:   /65 (BP Location: Right arm, Patient Position: Sitting, Cuff Size: Adult)   Pulse 84   Ht 162.6 cm (64.02\")   Wt 78.9 kg (174 lb)   SpO2 98%   BMI 29.85 kg/m²       Physical Exam  Constitutional:       General: She is not in acute distress.     Appearance: She is well-developed. She is not ill-appearing.   HENT:      Head: Normocephalic.   Eyes:      Pupils: Pupils are equal, round, and reactive to light.   Cardiovascular:      Rate and Rhythm: Normal rate and regular rhythm.      Heart sounds: " Normal heart sounds.   Pulmonary:      Effort: Pulmonary effort is normal.      Breath sounds: Normal breath sounds.   Abdominal:      General: Bowel sounds are normal. There is no distension.      Palpations: Abdomen is soft. There is no mass.      Tenderness: There is no abdominal tenderness. There is no guarding or rebound.      Hernia: No hernia is present.   Musculoskeletal:         General: Normal range of motion.   Skin:     General: Skin is warm and dry.   Neurological:      Mental Status: She is alert and oriented to person, place, and time.   Psychiatric:         Speech: Speech normal.         Behavior: Behavior normal.         Judgment: Judgment normal.           Assessment & Plan          Assessment and Plan    Diagnoses and all orders for this visit:    1. Gastroesophageal reflux disease with esophagitis without hemorrhage (Primary)    2. History of gastritis    3. Dysphagia, unspecified type      GERD is significantly better now on Dexilant 60 mg daily.  No longer using the Pepcid.  Bowels moving well.  No longer having any diarrhea.  Denies any dysphagia.  Overall doing significantly better.  Continue daily probiotics.  Patient to call the office with any issues.  Patient to follow-up with me in 6 months.  Patient is agreeable to the plan.          Follow Up       Follow Up   Return in about 6 months (around 4/7/2025) for GERD.  Patient was given instructions and counseling regarding her condition or for health maintenance advice. Please see specific information pulled into the AVS if appropriate.

## 2024-10-08 ENCOUNTER — TREATMENT (OUTPATIENT)
Dept: PHYSICAL THERAPY | Facility: CLINIC | Age: 59
End: 2024-10-08
Payer: COMMERCIAL

## 2024-10-08 DIAGNOSIS — M25.562 CHRONIC PAIN OF LEFT KNEE: Primary | ICD-10-CM

## 2024-10-08 DIAGNOSIS — G89.29 CHRONIC PAIN OF LEFT KNEE: Primary | ICD-10-CM

## 2024-10-08 DIAGNOSIS — Z74.09 IMPAIRED FUNCTIONAL MOBILITY AND ACTIVITY TOLERANCE: ICD-10-CM

## 2024-10-08 DIAGNOSIS — M94.262 CHONDROMALACIA OF KNEE, LEFT: ICD-10-CM

## 2024-10-08 DIAGNOSIS — M17.12 OSTEOARTHRITIS OF LEFT KNEE, UNSPECIFIED OSTEOARTHRITIS TYPE: ICD-10-CM

## 2024-10-08 NOTE — PROGRESS NOTES
Physical Therapy Daily Treatment Note      Patient: Vanesa Esquivel   : 1965  Referring practitioner: Akin Vaughn MD  Date of Initial Visit: Type: THERAPY  Noted: 2024  Today's Date: 10/8/2024  Patient seen for 3 sessions           Subjective Evaluation    History of Present Illness    Subjective comment: 4/10 in the back of the L knee       Objective   See Exercise, Manual, and Modality Logs for complete treatment.       Assessment & Plan       Assessment  Impairments: abnormal or restricted ROM, activity intolerance, impaired physical strength, lacks appropriate home exercise program and pain with function   Functional limitations: walking and stooping (Stairs; prolonged postioning)  Assessment details: Pt reports feeling that there is a pull coming from the sacral region as she points. There is a significant pelvic dysfunction with L rotation. There is moderate tightness in the hip flexors and tenderness along the lumbosacral region. This may indicate issues with the knee as the foot would not be hitting the ground correctly with amb.  Prognosis: good    Goals  Plan Goals: KNEE PROBLEMS:     1. The patient has limited flexibility and ROM of the left knee.   LTG 1: 6 weeks:  The patient will demonstrate 0 to 135 degrees of ROM for the left knee in order to allow patient to perform functional squat for ADLs.    STATUS:  Progressing   STG 1a: 3 weeks:  The patient will show improvement in mm flexibility with compliance with exercise program  STATUS:  Progressing    2. The patient has limited strength of the left knee and hip.   LTG 2: 6 weeks: The patient will demonstrate 4+ /5 strength for left knee and hip in order to allow patient improved joint stability    STATUS:  Progressing   STG 2a: 3 weeks: The patient will tolerate progression of strengthening exercises/activities  STATUS:  Progressing      3. The patient has left knee pain   LTG 3: 6 weeks:  The patient will report pain in left knee no >  3/10 with ADLs    STATUS:  Progressing   STG 3a: 3 weeks  The patient will report pain in the left knee no > 6/10 with ADLs    STATUS:  Progressing    4. Mobility: Walking/Moving Around Functional Limitation     LTG 4: 6 weeks weeks:  The patient will demonstrate 20 % limitation per score on WOMAC.    STATUS:  Progressing   STG 4 a: 3 weeks:  The patient will demonstrate 30 % limitation per score on WOMAC  STATUS:  Progressing     Plan  Therapy options: will be seen for skilled therapy services  Planned modality interventions: cryotherapy  Planned therapy interventions: therapeutic activities, stretching, strengthening, home exercise program, gait training, functional ROM exercises and neuromuscular re-education  Frequency: 1x week  Duration in weeks: 6  Treatment plan discussed with: patient          Visit Diagnoses:    ICD-10-CM ICD-9-CM   1. Chronic pain of left knee  M25.562 719.46    G89.29 338.29   2. Osteoarthritis of left knee, unspecified osteoarthritis type  M17.12 715.96   3. Chondromalacia of knee, left  M94.262 717.7   4. Impaired functional mobility and activity tolerance  Z74.09 V49.89       Progress per Plan of Care    Timed:    Therapeutic Exercise:    15     mins  42176;  Manual Therapy:    18     mins  85522;     Neuromuscular Lexx:       mins  15612;    Therapeutic Activity:     10     mins  35234;     Gait Training:           mins  75732;     Ultrasound:          mins  75991;      Untimed:  Electrical Stimulation:         mins  10055 ( );  Mechanical Traction:         mins  41816;   Group           mins  02603    Timed Treatment:   43   mins   Total Treatment:     45   mins      Erica Myles PTA  Physical Therapist Assistant

## 2024-10-21 ENCOUNTER — TREATMENT (OUTPATIENT)
Dept: PHYSICAL THERAPY | Facility: CLINIC | Age: 59
End: 2024-10-21
Payer: COMMERCIAL

## 2024-10-21 DIAGNOSIS — M17.12 OSTEOARTHRITIS OF LEFT KNEE, UNSPECIFIED OSTEOARTHRITIS TYPE: ICD-10-CM

## 2024-10-21 DIAGNOSIS — Z74.09 IMPAIRED FUNCTIONAL MOBILITY AND ACTIVITY TOLERANCE: ICD-10-CM

## 2024-10-21 DIAGNOSIS — M94.262 CHONDROMALACIA OF KNEE, LEFT: ICD-10-CM

## 2024-10-21 DIAGNOSIS — G89.29 CHRONIC PAIN OF LEFT KNEE: Primary | ICD-10-CM

## 2024-10-21 DIAGNOSIS — M25.562 CHRONIC PAIN OF LEFT KNEE: Primary | ICD-10-CM

## 2024-10-21 PROCEDURE — 97140 MANUAL THERAPY 1/> REGIONS: CPT | Performed by: PHYSICAL THERAPIST

## 2024-10-21 PROCEDURE — 97110 THERAPEUTIC EXERCISES: CPT | Performed by: PHYSICAL THERAPIST

## 2024-10-21 NOTE — PROGRESS NOTES
Re-Assessment / Re-Certification        Patient: Vanesa Esquivel   : 1965  Diagnosis/ICD-10 Code:  Chronic pain of left knee [M25.562, G89.29]  Referring practitioner: Akin Vaughn MD  Date of Initial Visit: Type: THERAPY  Noted: 2024  Today's Date: 10/21/2024  Patient seen for 4 sessions      Subjective:     Visit Diagnoses:    ICD-10-CM ICD-9-CM   1. Chronic pain of left knee  M25.562 719.46    G89.29 338.29   2. Osteoarthritis of left knee, unspecified osteoarthritis type  M17.12 715.96   3. Chondromalacia of knee, left  M94.262 717.7   4. Impaired functional mobility and activity tolerance  Z74.09 V49.89       Subjective Questionnaire: WOMAC ()  Home Program Compliance: No  Treatment has included: therapeutic exercise, neuromuscular re-education, manual therapy, and therapeutic activity      Subjective Evaluation    History of Present Illness  Mechanism of injury: KNEE PAIN today  Front- tingling, spike like, zap pain  Back - tight, swelling, pressure pain    She started coming to PT for anterior knee pain, but it is now really bothering her in the back of the knee.  3-4/10, she is now limping because of the pain.  It feels like her L calf is tight.    She feels like there is a tightness like band feeling from the sacrum to the L posterior knee.    She doesn't feel like she has really gotten a chance to fully get anything out of PT.  She has had to move appts to help take care of her son and her mother.  She is not as compliant with her HEP due to the reasons, trying to get better with it.            Objective           General Comments     Knee Comments  Additional Knee Observation Details  Posture - left shoulder and iliac crest elevated; mild edema suprapatella LvsR     Tenderness Details  No TTP     Active Range of Motion   Left Knee   Flexion: 130 (tight) degrees   Extension: 0 degrees      Right Knee   Flexion: 135 degrees   Extension: 0 degrees         Strength/Myotome Testing       Left Hip   Planes of Motion   Flexion: 4, uncomfortable  Abduction: 4+  Adduction: 4+  External rotation: 4-     Right Hip   Planes of Motion   Flexion: 4-  Abduction: 4+  Adduction: 4+  External rotation: 4+     Left Knee   Flexion: 4, painful  Extension: 4  Quadriceps contraction: fair     Right Knee   Flexion: 5  Extension: 5  Quadriceps contraction: good        Left Knee Flexibility Comments:   Moderate HS tight B; mod quad and hip flexor tightness L; ITB tightness B     Ambulation: no AD, but is now presenting with a limp on the LLE               Assessment & Plan       Assessment  Impairments: abnormal or restricted ROM, activity intolerance, impaired physical strength, lacks appropriate home exercise program and pain with function   Functional limitations: walking and stooping (Stairs; prolonged postioning)  Assessment details: Vanesa continues to have increased knee pain on the L side, but it is more on the posterior knee and running in a tight band like fashion towards the sacral region as she describes.  She has not been compliant with PT and her HEP due to her son needing her and her mother's appts.  Updated stretches/HEP today to help with the pain and tightness, continue with PT as much as possible on a constant schedule to help with carry over.  She continues to have L pelvic rotation and tightness throughout the whole L side of her body.  Patient will continue to benefit from further PT services to address their remaining deficits noted and progress to achieve their goals.     Prognosis: good    Goals  Plan Goals: KNEE PROBLEMS:     1. The patient has limited flexibility and ROM of the left knee.   LTG 1: 6 weeks:  The patient will demonstrate 0 to 135 degrees of ROM for the left knee in order to allow patient to perform functional squat for ADLs.    STATUS:  Progressing   STG 1a: 3 weeks:  The patient will show improvement in mm flexibility with compliance with exercise program    STATUS:   Progressing    2. The patient has limited strength of the left knee and hip.   LTG 2: 6 weeks: The patient will demonstrate 4+ /5 strength for left knee and hip in order to allow patient improved joint stability    STATUS:  Progressing   STG 2a: 3 weeks: The patient will tolerate progression of strengthening exercises/activities    STATUS:  Progressing      3. The patient has left knee pain   LTG 3: 6 weeks:  The patient will report pain in left knee no > 3/10 with ADLs    STATUS:  Progressing   STG 3a: 3 weeks  The patient will report pain in the left knee no > 6/10 with ADLs    STATUS:  MET    4. Mobility: Walking/Moving Around Functional Limitation     LTG 4: 6 weeks weeks:  The patient will demonstrate 20 % limitation per score on WOMAC.    STATUS:  Progressing   STG 4 a: 3 weeks:  The patient will demonstrate 30 % limitation per score on WOMAC  STATUS:  Progressing     Plan  Therapy options: will be seen for skilled therapy services  Planned modality interventions: cryotherapy  Planned therapy interventions: therapeutic activities, stretching, strengthening, home exercise program, gait training, functional ROM exercises and neuromuscular re-education  Frequency: 2x week  Duration in weeks: 12  Treatment plan discussed with: patient        Progress toward previous goals: Partially Met      Recommendations: Continue as planned  Timeframe: 2 months  Prognosis to achieve goals: good    Timed:  Manual Therapy:    8     mins  99522;  Therapeutic Exercise:    8     mins  19284;     Neuromuscular Lexx:        mins  30121;    Therapeutic Activity:          mins  09547;     Gait Training:           mins  64091;     Ultrasound:          mins  79773;    Can[employer]           ___  mins  64516      Untimed:  Electrical Stimulation:         mins  12780 (MC );  Mechanical Traction:         mins  31003;   Dry Needling:                     mins self pay  Re-Eval:                           mins  15171         Timed  Treatment:   16   mins   Total Treatment:     18   mins        PT SIGNATURE: Shi IZAIAH Blackman, DPT  [place] License: [id number]       Certification Period: 10/21/2024 thru 1/18/2025  I certify that the therapy services are furnished while this patient is under my care.  The services outlined above are required by this patient, and will be reviewed every 90 days.     PHYSICIAN: Akin Vaughn MD  NPI: [id number]      PHYSICIAN PRINT NAME: ______________________________________________      PHYSICIAN SIGNATURE: ______________________________________________         DATE:________________________________        Please sign and return via fax to [phone].  Thank you, [facility].

## 2024-10-23 ENCOUNTER — TREATMENT (OUTPATIENT)
Dept: PHYSICAL THERAPY | Facility: CLINIC | Age: 59
End: 2024-10-23
Payer: COMMERCIAL

## 2024-10-23 DIAGNOSIS — M17.12 OSTEOARTHRITIS OF LEFT KNEE, UNSPECIFIED OSTEOARTHRITIS TYPE: ICD-10-CM

## 2024-10-23 DIAGNOSIS — M25.562 CHRONIC PAIN OF LEFT KNEE: Primary | ICD-10-CM

## 2024-10-23 DIAGNOSIS — M94.262 CHONDROMALACIA OF KNEE, LEFT: ICD-10-CM

## 2024-10-23 DIAGNOSIS — G89.29 CHRONIC PAIN OF LEFT KNEE: Primary | ICD-10-CM

## 2024-10-23 DIAGNOSIS — Z74.09 IMPAIRED FUNCTIONAL MOBILITY AND ACTIVITY TOLERANCE: ICD-10-CM

## 2024-10-23 NOTE — PROGRESS NOTES
Physical Therapy Daily Treatment Note      Patient: Vanesa Esquivel   : 1965  Referring practitioner: Akin Vaughn MD  Date of Initial Visit: Type: THERAPY  Noted: 2024  Today's Date: 10/23/2024  Patient seen for 5 sessions           Subjective Evaluation    History of Present Illness    Subjective comment: 2/10 in the L knee       Objective   See Exercise, Manual, and Modality Logs for complete treatment.       Assessment & Plan       Assessment  Assessment details: Pt has low pain in the knee at this tx visit but had shooting pain in the L knee last night. There is good tolerance to all activities at this tx visit. No increase in pain or discomfort was reported by pt.          Visit Diagnoses:    ICD-10-CM ICD-9-CM   1. Chronic pain of left knee  M25.562 719.46    G89.29 338.29   2. Osteoarthritis of left knee, unspecified osteoarthritis type  M17.12 715.96   3. Chondromalacia of knee, left  M94.262 717.7   4. Impaired functional mobility and activity tolerance  Z74.09 V49.89       Progress per Plan of Care    Timed:    Therapeutic Exercise:    8     mins  76111;  Manual Therapy:         mins  83602;     Neuromuscular Lexx:       mins  98876;    Therapeutic Activity:    20      mins  20103;     Gait Training:           mins  21600;     Ultrasound:          mins  48418;      Untimed:  Electrical Stimulation:         mins  92432 ( );  Mechanical Traction:         mins  25812;   Group           mins  39132    Timed Treatment:   28   mins   Total Treatment:     30   mins      Erica Myles PTA  Physical Therapist Assistant

## 2024-10-29 ENCOUNTER — TREATMENT (OUTPATIENT)
Dept: PHYSICAL THERAPY | Facility: CLINIC | Age: 59
End: 2024-10-29
Payer: COMMERCIAL

## 2024-10-29 DIAGNOSIS — M94.262 CHONDROMALACIA OF KNEE, LEFT: ICD-10-CM

## 2024-10-29 DIAGNOSIS — M25.562 CHRONIC PAIN OF LEFT KNEE: Primary | ICD-10-CM

## 2024-10-29 DIAGNOSIS — Z74.09 IMPAIRED FUNCTIONAL MOBILITY AND ACTIVITY TOLERANCE: ICD-10-CM

## 2024-10-29 DIAGNOSIS — M17.12 OSTEOARTHRITIS OF LEFT KNEE, UNSPECIFIED OSTEOARTHRITIS TYPE: ICD-10-CM

## 2024-10-29 DIAGNOSIS — G89.29 CHRONIC PAIN OF LEFT KNEE: Primary | ICD-10-CM

## 2024-10-29 NOTE — PROGRESS NOTES
Physical Therapy Daily Treatment Note      Patient: Vanesa Esquivel   : 1965  Referring practitioner: Akin Vaughn MD  Date of Initial Visit: Type: THERAPY  Noted: 2024  Today's Date: 10/29/2024  Patient seen for 6 sessions           Subjective Evaluation    History of Present Illness    Subjective comment: 2/10 in the L knee       Objective   See Exercise, Manual, and Modality Logs for complete treatment.       Assessment & Plan       Assessment  Assessment details: Pt is showing good ability to perform exercises. Balance activities are more challenging and require more effort on the pts part. Knee pain is low and there was no report of an increase in pain or discomfort with any of the activities.          Visit Diagnoses:    ICD-10-CM ICD-9-CM   1. Chronic pain of left knee  M25.562 719.46    G89.29 338.29   2. Osteoarthritis of left knee, unspecified osteoarthritis type  M17.12 715.96   3. Chondromalacia of knee, left  M94.262 717.7   4. Impaired functional mobility and activity tolerance  Z74.09 V49.89       Progress per Plan of Care    Timed:    Therapeutic Exercise:    8     mins  09360;  Manual Therapy:         mins  43415;     Neuromuscular Lexx:   10    mins  65018;    Therapeutic Activity:     12     mins  41244;     Gait Training:           mins  46935;     Ultrasound:          mins  91673;      Untimed:  Electrical Stimulation:         mins  21196 ( );  Mechanical Traction:         mins  55913;   Group           mins  31911    Timed Treatment:   30   mins   Total Treatment:     32   mins      Erica Myles PTA  Physical Therapist Assistant

## 2024-10-30 ENCOUNTER — LAB (OUTPATIENT)
Dept: LAB | Facility: HOSPITAL | Age: 59
End: 2024-10-30
Payer: COMMERCIAL

## 2024-10-30 ENCOUNTER — OFFICE VISIT (OUTPATIENT)
Dept: FAMILY MEDICINE CLINIC | Age: 59
End: 2024-10-30
Payer: COMMERCIAL

## 2024-10-30 VITALS
OXYGEN SATURATION: 97 % | HEIGHT: 64 IN | SYSTOLIC BLOOD PRESSURE: 113 MMHG | WEIGHT: 177.4 LBS | DIASTOLIC BLOOD PRESSURE: 69 MMHG | HEART RATE: 83 BPM | BODY MASS INDEX: 30.29 KG/M2

## 2024-10-30 DIAGNOSIS — K21.9 GASTROESOPHAGEAL REFLUX DISEASE WITHOUT ESOPHAGITIS: Primary | ICD-10-CM

## 2024-10-30 DIAGNOSIS — E55.9 VITAMIN D DEFICIENCY, UNSPECIFIED: ICD-10-CM

## 2024-10-30 DIAGNOSIS — M54.2 NECK PAIN: ICD-10-CM

## 2024-10-30 DIAGNOSIS — R53.83 OTHER FATIGUE: ICD-10-CM

## 2024-10-30 DIAGNOSIS — M79.7 FIBROMYALGIA: ICD-10-CM

## 2024-10-30 DIAGNOSIS — F41.8 ANXIETY WITH DEPRESSION: ICD-10-CM

## 2024-10-30 DIAGNOSIS — E66.811 CLASS 1 OBESITY WITHOUT SERIOUS COMORBIDITY WITH BODY MASS INDEX (BMI) OF 30.0 TO 30.9 IN ADULT, UNSPECIFIED OBESITY TYPE: ICD-10-CM

## 2024-10-30 DIAGNOSIS — R68.82 DECREASED LIBIDO: ICD-10-CM

## 2024-10-30 DIAGNOSIS — H93.12 TINNITUS OF LEFT EAR: ICD-10-CM

## 2024-10-30 LAB — 25(OH)D3 SERPL-MCNC: 49.9 NG/ML (ref 30–100)

## 2024-10-30 PROCEDURE — 99214 OFFICE O/P EST MOD 30 MIN: CPT | Performed by: FAMILY MEDICINE

## 2024-10-30 PROCEDURE — 80053 COMPREHEN METABOLIC PANEL: CPT

## 2024-10-30 PROCEDURE — 84439 ASSAY OF FREE THYROXINE: CPT

## 2024-10-30 PROCEDURE — 36415 COLL VENOUS BLD VENIPUNCTURE: CPT

## 2024-10-30 PROCEDURE — 82306 VITAMIN D 25 HYDROXY: CPT

## 2024-10-30 PROCEDURE — 84443 ASSAY THYROID STIM HORMONE: CPT

## 2024-10-30 RX ORDER — FAMOTIDINE 40 MG/1
40 TABLET, FILM COATED ORAL 2 TIMES DAILY
Qty: 180 TABLET | Refills: 3 | Status: SHIPPED | OUTPATIENT
Start: 2024-10-30

## 2024-10-30 NOTE — PROGRESS NOTES
Vanesa Esquivel presents to [facility].    Chief Complaint:  f/u GERD and depression    Subjective     History of Present Illness:  Anxiety   Symptoms include depressed mood.  Patient reports no chest pain, dizziness, nausea, nervous/anxious behavior, palpitations, shortness of breath or suicidal ideas. Her past medical history is significant for depression.   DepressionSymptoms include depressed mood. Patient is not experiencing: nervousness/anxiety, palpitations, shortness of breath, suicidal ideas, chest pain, dizziness and nausea.  Her past medical history is significant for depression.       She presents to follow-up for her chronic GERD with gastritis, worse since she stopped pepcid, but she is still on dexilant, on pepcid 40 mg bid.  She is aware she needs to avoid spicy and acidic foods     She presents with ongoing memory loss, sees neurology Graciela Helm and s/p MRI brain for memory loss and it was negative for findings.  Of note, she has a sister dx with brain tumor. B12 was normal.  She is not currently on medication or treatment for this.     She presents with chronic depression and she says she is stable and unchanged.  She is on Lexapro 20 mg daily and BuSpar 5 mg twice daily.  She is sleeping okay.  She has a lot of stressors in the home with her .  She has decreased libido.  She is functional though.  She defers therapy.    She has no homicidal or suicidal ideation.   She has longstanding fibromyagia with fatigue and she is functional stable with the Lexapro.  She also uses CBD oil which tends to help her more than anything.    She has sleep apnea and didn't tolerate the CPAP.     H/O elevated alkaline phosphatase at 124.      She has vitamin D deficiency and is stable on vitamin D supplementation    Result Review   The following data was reviewed by Fina Bardales MD on 10/30/2024.  Lab Results   Component Value Date    WBC 7.36 04/23/2024    HGB 13.2  "04/23/2024    HCT 39.1 04/23/2024    MCV 86.5 04/23/2024     04/23/2024     Lab Results   Component Value Date    GLUCOSE 75 04/23/2024    BUN 12 04/23/2024    CREATININE 0.68 04/23/2024     04/23/2024    K 4.5 04/23/2024     04/23/2024    CALCIUM 9.3 04/23/2024    PROTEINTOT 6.6 04/23/2024    ALBUMIN 4.3 04/23/2024    ALT 17 04/23/2024    AST 14 04/23/2024    ALKPHOS 105 04/23/2024    BILITOT 0.5 04/23/2024    GLOB 2.3 04/23/2024    AGRATIO 1.9 04/23/2024    BCR 17.6 04/23/2024    ANIONGAP 9.4 04/23/2024    EGFR 101.7 04/23/2024     Lab Results   Component Value Date    CHOL 155 04/23/2024    CHLPL 169 04/01/2021    TRIG 93 04/23/2024    HDL 82 (H) 04/23/2024    LDL 56 04/23/2024     No results found for: \"TSH\"  No results found for: \"HGBA1C\"  No results found for: \"PSA\"      Lab Results   Component Value Date    DVSN80KD 33.0 04/23/2024               Assessment and Plan:   Diagnoses and all orders for this visit:    1. Gastroesophageal reflux disease without esophagitis (Primary)  Comments:  Symptoms are worse.  Currently on dexilant.  Will restart Pepcid 40 mg 1-2 times a day and she needs to avoid spicy and acidic foods in her diet  Orders:  -     famotidine (PEPCID) 40 MG tablet; Take 1 tablet by mouth 2 (Two) Times a Day.  Dispense: 180 tablet; Refill: 3    2. Vitamin D deficiency, unspecified  Comments:  Stable on vitamin D supplementation.  Will check labs and adjust Tx plan pending results  Orders:  -     Vitamin D,25-Hydroxy; Future    3. Fibromyalgia  Comments:  Recommend PT.  In addition continue Lexapro.  She tolerates well and she is functional at this time    4. Anxiety with depression  Comments:  overall stable and current tx plan.  Will refill Lexapro and BuSpar as needed.    5. Tinnitus of left ear  Comments:  recc she try OTC lipo- flavanoid    6. Decreased libido  Comments:  counseled on testosterone gel and she defers    7. Other fatigue  Comments:  will recheck tsh today, cbc " "in April was normal  Orders:  -     Comprehensive Metabolic Panel; Future  -     TSH+Free T4; Future    8. Class 1 obesity without serious comorbidity with body mass index (BMI) of 30.0 to 30.9 in adult, unspecified obesity type  Comments:  [facility] dietician referral and she defers, [facility] daily exercise and [place] diet    9. Neck pain  Comments:  [facility] PT and she defers              Objective     Medications:  Current Outpatient Medications   Medication Instructions    alclometasone (ACLOVATE) 0.05 % cream Topical, See Admin Instructions, apply topically to the appropriate area as directed 2 times a day    busPIRone (BUSPAR) 10 mg, Oral, 2 Times Daily    CBD (cannabidiol) oral oil Take  by mouth.    dexlansoprazole (DEXILANT) 60 mg, Oral, Daily    escitalopram (LEXAPRO) 20 mg, Oral, Daily    estradiol (VAGIFEM) 10 mcg, Vaginal, 2 Times Weekly    famotidine (PEPCID) 40 mg, Oral, 2 Times Daily    meclizine (ANTIVERT) 12.5 mg, Oral, 3 Times Daily PRN    Multiple Vitamins-Minerals (EMERGEN-C IMMUNE PO) Take  by mouth.        Vital Signs:   /69 (BP Location: Left arm, Patient Position: Sitting, Cuff Size: Adult)   Pulse 83   Ht 162.6 cm (64.02\")   Wt 80.5 kg (177 lb 6.4 oz)   SpO2 97%   BMI 30.44 kg/m²             Physical Exam:  Physical Exam  Vitals and nursing note reviewed.   Constitutional:       General: She is not in acute distress.     Appearance: Normal appearance. She is not ill-appearing, toxic-appearing or diaphoretic.   HENT:      Head: Normocephalic and atraumatic.      Right Ear: Tympanic membrane, ear canal and external ear normal.      Left Ear: Tympanic membrane, ear canal and external ear normal.      Nose: No congestion or rhinorrhea.      Mouth/Throat:      Mouth: Mucous membranes are moist.      Pharynx: Oropharynx is clear. No oropharyngeal exudate or posterior oropharyngeal erythema.   Eyes:      Extraocular Movements: Extraocular movements intact.      Conjunctiva/sclera: Conjunctivae " normal.      Pupils: Pupils are equal, round, and reactive to light.   Cardiovascular:      Rate and Rhythm: Normal rate and regular rhythm.      Heart sounds: Normal heart sounds.   Pulmonary:      Effort: Pulmonary effort is normal.      Breath sounds: Normal breath sounds. No wheezing, rhonchi or rales.   Abdominal:      General: Abdomen is flat.      Palpations: Abdomen is soft. There is no mass.      Tenderness: There is no abdominal tenderness.      Hernia: No hernia is present.   Musculoskeletal:      Cervical back: Neck supple. No rigidity.      Right lower leg: No edema.      Left lower leg: No edema.   Lymphadenopathy:      Cervical: No cervical adenopathy.   Skin:     General: Skin is warm and dry.   Neurological:      General: No focal deficit present.      Mental Status: She is alert and oriented to person, place, and time. Mental status is at baseline.   Psychiatric:         Mood and Affect: Mood normal.         Behavior: Behavior normal.         Thought Content: Thought content normal.         Judgment: Judgment normal.           Review of Systems:  Review of Systems   Constitutional:  Positive for fatigue. Negative for chills and fever.   HENT:  Negative for ear pain, sinus pressure and sore throat.    Eyes:  Negative for blurred vision and double vision.   Respiratory:  Negative for cough, shortness of breath and wheezing.    Cardiovascular:  Negative for chest pain and palpitations.   Gastrointestinal:  Negative for abdominal pain, blood in stool, constipation, diarrhea, nausea and vomiting.   Skin:  Negative for rash.   Neurological:  Positive for headache. Negative for dizziness.   Psychiatric/Behavioral:  Positive for depressed mood. Negative for sleep disturbance and suicidal ideas. The patient is not nervous/anxious.               Follow Up   No follow-ups on file.    Part of this note may be an electronic transcription/translation of spoken language to printed   text using the Dragon  Dictation System.            Medical History:  Medications Discontinued During This Encounter   Medication Reason    famotidine (PEPCID) 40 MG tablet Reorder      Past Medical History:    Abnormal Pap smear of cervix    Acute atopic conjunctivitis, bilateral    Acute pharyngitis, unspecified    Chest pain on breathing    Fibromyalgia    Fibromyalgia, primary    Gastro-esophageal reflux disease without esophagitis    H/O vulvar dysplasia    Headache, tension-type    Off and on though out life    Low back pain    Major depressive disorder, single episode, mild    Memory loss    Past several years seems to be getting worse    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    Other atopic dermatitis    Other pruritus    Other specified anxiety disorders    Overweight    Primary generalized hypertrophic osteoarthrosis    Sleep apnea    Tinnitus, bilateral    Vitamin D deficiency, unspecified     Past Surgical History:    COLONOSCOPY    Procedure: COLONOSCOPY;  Surgeon: Melanie Thomas MD;  Location: [facility];  Service: Gastroenterology;  Laterality: N/A;  HEMORRHOIDS    ENDOSCOPY    Procedure: ESOPHAGOGASTRODUODENOSCOPY WITH BIOPSIES;  Surgeon: Melanie Thomas MD;  Location: [facility];  Service: Gastroenterology;  Laterality: N/A;  GASTRITIS    HYSTERECTOMY    ABN PAP    MOUTH SURGERY    expanded palate    OVARIAN CYST SURGERY    Unsure which ovary    TUBAL ABDOMINAL LIGATION    VULVA BIOPSY    Dysplasia    WISDOM TOOTH EXTRACTION      Family History   Problem Relation Age of Onset    Stroke Father     Alcohol abuse Father     Diabetes Father     Hyperlipidemia Father     Alcohol abuse Mother     Heart disease Mother     Hypertension Mother     Cancer Paternal Grandfather     Stroke Paternal Grandmother     Parkinsonism Paternal Grandmother     Heart attack Maternal Grandmother     Cancer Maternal Grandfather     Breast cancer Neg Hx     Ovarian cancer Neg Hx     Uterine  cancer Neg Hx     Colon cancer Neg Hx     Prostate cancer Neg Hx      Social History     Tobacco Use    Smoking status: Never     Passive exposure: Never    Smokeless tobacco: Never   Substance Use Topics    Alcohol use: Not Currently       Health Maintenance Due   Topic Date Due    INFLUENZA VACCINE  08/01/2024        Immunization History   Administered Date(s) Administered    COVID-19 (MODERNA) 1st,2nd,3rd Dose Monovalent 02/12/2021, 03/12/2021    COVID-19 (MODERNA) Monovalent Original Booster 12/10/2021, 04/12/2022    COVID-19 (PFIZER) 12YRS+ (COMIRNATY) 10/23/2023    Flu Vaccine Intradermal Quad 18-64YR 09/15/2020    Fluzone (or Fluarix & Flulaval for VFC) >6mos 10/27/2022, 10/23/2023    Influenza Injectable Mdck Pf Quad 09/15/2020    Influenza, Unspecified 09/17/2020, 10/27/2022    Tdap 06/02/2022       Allergies   Allergen Reactions    Carafate [Sucralfate] Other (See Comments)     Tightness around her throat, dry mouth

## 2024-10-31 LAB
ALBUMIN SERPL-MCNC: 4.1 G/DL (ref 3.5–5.2)
ALBUMIN/GLOB SERPL: 1.4 G/DL
ALP SERPL-CCNC: 100 U/L (ref 39–117)
ALT SERPL W P-5'-P-CCNC: 20 U/L (ref 1–33)
ANION GAP SERPL CALCULATED.3IONS-SCNC: 7.7 MMOL/L (ref 5–15)
AST SERPL-CCNC: 21 U/L (ref 1–32)
BILIRUB SERPL-MCNC: 0.3 MG/DL (ref 0–1.2)
BUN SERPL-MCNC: 9 MG/DL (ref 6–20)
BUN/CREAT SERPL: 11.1 (ref 7–25)
CALCIUM SPEC-SCNC: 9.4 MG/DL (ref 8.6–10.5)
CHLORIDE SERPL-SCNC: 104 MMOL/L (ref 98–107)
CO2 SERPL-SCNC: 28.3 MMOL/L (ref 22–29)
CREAT SERPL-MCNC: 0.81 MG/DL (ref 0.57–1)
EGFRCR SERPLBLD CKD-EPI 2021: 84.3 ML/MIN/1.73
GLOBULIN UR ELPH-MCNC: 3 GM/DL
GLUCOSE SERPL-MCNC: 97 MG/DL (ref 65–99)
POTASSIUM SERPL-SCNC: 4.3 MMOL/L (ref 3.5–5.2)
PROT SERPL-MCNC: 7.1 G/DL (ref 6–8.5)
SODIUM SERPL-SCNC: 140 MMOL/L (ref 136–145)
T4 FREE SERPL-MCNC: 1.1 NG/DL (ref 0.92–1.68)
TSH SERPL DL<=0.05 MIU/L-ACNC: 1.02 UIU/ML (ref 0.27–4.2)

## 2024-11-05 ENCOUNTER — TREATMENT (OUTPATIENT)
Dept: PHYSICAL THERAPY | Facility: CLINIC | Age: 59
End: 2024-11-05
Payer: COMMERCIAL

## 2024-11-05 DIAGNOSIS — M94.262 CHONDROMALACIA OF KNEE, LEFT: ICD-10-CM

## 2024-11-05 DIAGNOSIS — Z74.09 IMPAIRED FUNCTIONAL MOBILITY AND ACTIVITY TOLERANCE: ICD-10-CM

## 2024-11-05 DIAGNOSIS — M25.562 CHRONIC PAIN OF LEFT KNEE: Primary | ICD-10-CM

## 2024-11-05 DIAGNOSIS — M17.12 OSTEOARTHRITIS OF LEFT KNEE, UNSPECIFIED OSTEOARTHRITIS TYPE: ICD-10-CM

## 2024-11-05 DIAGNOSIS — G89.29 CHRONIC PAIN OF LEFT KNEE: Primary | ICD-10-CM

## 2024-11-05 NOTE — PROGRESS NOTES
Physical Therapy Daily Treatment Note      Patient: Vanesa Esquivel   : 1965  Referring practitioner: Akin Vaughn MD  Date of Initial Visit: Type: THERAPY  Noted: 2024  Today's Date: 2024  Patient seen for 7 sessions           Visit Diagnoses:    ICD-10-CM ICD-9-CM   1. Chronic pain of left knee  M25.562 719.46    G89.29 338.29   2. Osteoarthritis of left knee, unspecified osteoarthritis type  M17.12 715.96   3. Chondromalacia of knee, left  M94.262 717.7   4. Impaired functional mobility and activity tolerance  Z74.09 V49.89       Subjective Evaluation    History of Present Illness  Mechanism of injury: 2/10 pain in the L knee, more medially.    She notes that her neck is bothering her today, has headaches.              Objective   See Exercise, Manual, and Modality Logs for complete treatment.       Assessment & Plan       Assessment  Assessment details: Progressed with further strengthening work in the LE.  She is feeling better.  We are going to let her do HEP for 2 weeks, then she will come back in or discharge over phone if she doesn't feel she needs therapy.  I want to see consistency with HEP to see how she does with it and her pain levels.      She felt better leaving session today compared to when she arrived today.           Progress per Plan of Care and Progress strengthening /stabilization /functional activity           Timed:  Manual Therapy:         mins  12105;  Therapeutic Exercise:    16     mins  09842;     Neuromuscular Lexx:    12    mins  65186;    Therapeutic Activity:     8     mins  18989;     Gait Training:           mins  36588;     Ultrasound:          mins  32726;    Canalith Repos           ___  mins  98194      Untimed:  Electrical Stimulation:         mins  80850 ( );  Mechanical Traction:         mins  06779;   Dry Needling:                     mins self pay       Timed Treatment:   36   mins   Total Treatment:     36   mins      Shi Blackman, PT, DPT  [place]  License #: [id number]

## 2024-11-18 ENCOUNTER — TREATMENT (OUTPATIENT)
Dept: PHYSICAL THERAPY | Facility: CLINIC | Age: 59
End: 2024-11-18
Payer: COMMERCIAL

## 2024-11-18 DIAGNOSIS — M94.262 CHONDROMALACIA OF KNEE, LEFT: ICD-10-CM

## 2024-11-18 DIAGNOSIS — M25.562 CHRONIC PAIN OF LEFT KNEE: Primary | ICD-10-CM

## 2024-11-18 DIAGNOSIS — M17.12 OSTEOARTHRITIS OF LEFT KNEE, UNSPECIFIED OSTEOARTHRITIS TYPE: ICD-10-CM

## 2024-11-18 DIAGNOSIS — Z74.09 IMPAIRED FUNCTIONAL MOBILITY AND ACTIVITY TOLERANCE: ICD-10-CM

## 2024-11-18 DIAGNOSIS — G89.29 CHRONIC PAIN OF LEFT KNEE: Primary | ICD-10-CM

## 2024-11-18 NOTE — PROGRESS NOTES
Re-Assessment / Re-Certification        Patient: Vanesa Esquivel   : 1965  Diagnosis/ICD-10 Code:  Chronic pain of left knee [M25.562, G89.29]  Referring practitioner: Akin Vaughn MD  Date of Initial Visit: Type: THERAPY  Noted: 2024  Today's Date: 2024  Patient seen for 8 sessions      Subjective:     Visit Diagnoses:    ICD-10-CM ICD-9-CM   1. Chronic pain of left knee  M25.562 719.46    G89.29 338.29   2. Osteoarthritis of left knee, unspecified osteoarthritis type  M17.12 715.96   3. Chondromalacia of knee, left  M94.262 717.7   4. Impaired functional mobility and activity tolerance  Z74.09 V49.89       Clinical Progress: improved  Treatment has included: therapeutic exercise, neuromuscular re-education, manual therapy, and therapeutic activity      Subjective Evaluation    History of Present Illness  Mechanism of injury: She was not consistent with her HEP the last few weeks, but she did do some.  She thinks the L knee is doing better, still gets occasional pings of pain.          Objective           General Comments     Knee Comments  Additional Knee Observation Details  Posture - left shoulder and iliac crest elevated; mild edema suprapatella LvsR     Tenderness Details  No TTP     Active Range of Motion   Left Knee   Flexion: 130 (tight) degrees   Extension: 0 degrees      Right Knee   Flexion: 135 degrees   Extension: 0 degrees         Strength/Myotome Testing      Left Hip   Planes of Motion   Flexion: 4, uncomfortable  Abduction: 4+  Adduction: 4+  External rotation: 4-     Right Hip   Planes of Motion   Flexion: 4-  Abduction: 4+  Adduction: 4+  External rotation: 4+     Left Knee   Flexion: 4, painful  Extension: 4  Quadriceps contraction: fair     Right Knee   Flexion: 5  Extension: 5  Quadriceps contraction: good        Left Knee Flexibility Comments:   Moderate HS tight B; mod quad and hip flexor tightness L; ITB tightness B     Ambulation: no AD, but is now presenting with a  limp on the LLE             Assessment & Plan       Assessment  Functional limitations: (Stairs; prolonged postioning)  Assessment details: Overall, Vanesa has shown progress with the L knee ROM, strength, and overall function.  She is only having periods of pain at the lateral L joint line of the knee, but not constant.  She feels like she is doing better; could discharge today.  Reviewed HEP, education on consistency of exercise for further gains.  She will be discharged from PT today.    Prognosis: good    Goals  Plan Goals: KNEE PROBLEMS:     1. The patient has limited flexibility and ROM of the left knee.   LTG 1: 6 weeks:  The patient will demonstrate 0 to 135 degrees of ROM for the left knee in order to allow patient to perform functional squat for ADLs.    STATUS:  MET   STG 1a: 3 weeks:  The patient will show improvement in mm flexibility with compliance with exercise program    STATUS:  MET    2. The patient has limited strength of the left knee and hip.   LTG 2: 6 weeks: The patient will demonstrate 4+ /5 strength for left knee and hip in order to allow patient improved joint stability    STATUS:  Progressing   STG 2a: 3 weeks: The patient will tolerate progression of strengthening exercises/activities    STATUS:  MET     3. The patient has left knee pain   LTG 3: 6 weeks:  The patient will report pain in left knee no > 3/10 with ADLs    STATUS:  MET   STG 3a: 3 weeks  The patient will report pain in the left knee no > 6/10 with ADLs    STATUS:  MET    4. Mobility: Walking/Moving Around Functional Limitation     LTG 4: 6 weeks weeks:  The patient will demonstrate 20 % limitation per score on WOMAC.    STATUS:  Progressing   STG 4 a: 3 weeks:  The patient will demonstrate 30 % limitation per score on WOMAC  STATUS:  Progressing     Plan  Treatment plan discussed with: patient  Plan details: Discharge, continue with HEP.        Progress toward previous goals: Partially Met      Recommendations: Continue as  planned  Timeframe: 2 months  Prognosis to achieve goals: good    Timed:  Manual Therapy:         mins  51853;  Therapeutic Exercise:    8     mins  09594;     Neuromuscular Lexx:        mins  31986;    Therapeutic Activity:     10     mins  29623;     Gait Training:           mins  88450;     Ultrasound:          mins  39873;    Canalith Repos           ___  mins  40370      Untimed:  Electrical Stimulation:         mins  76184 ( );  Mechanical Traction:         mins  63155;   Dry Needling:                     mins self pay  Re-Eval:                           mins  67325         Timed Treatment:   18   mins   Total Treatment:     20   mins          PT SIGNATURE: Shi Blackman PT, DPT  [place] License: [id number]       Certification Period: 11/18/2024 thru 2/15/2025  I certify that the therapy services are furnished while this patient is under my care.  The services outlined above are required by this patient, and will be reviewed every 90 days.     PHYSICIAN: Akin Vaughn MD  NPI: [id number]      PHYSICIAN PRINT NAME: ______________________________________________      PHYSICIAN SIGNATURE: ______________________________________________         DATE:________________________________        Please sign and return via fax to [phone].  Thank you, [facility].

## 2024-12-09 DIAGNOSIS — M79.7 FIBROMYALGIA: ICD-10-CM

## 2024-12-09 DIAGNOSIS — F32.0 MAJOR DEPRESSIVE DISORDER, SINGLE EPISODE, MILD: ICD-10-CM

## 2024-12-10 RX ORDER — DEXLANSOPRAZOLE 60 MG/1
60 CAPSULE, DELAYED RELEASE ORAL DAILY
Qty: 90 CAPSULE | Refills: 0 | Status: SHIPPED | OUTPATIENT
Start: 2024-12-10

## 2024-12-10 RX ORDER — ESCITALOPRAM OXALATE 20 MG/1
20 TABLET ORAL DAILY
Qty: 90 TABLET | Refills: 0 | Status: SHIPPED | OUTPATIENT
Start: 2024-12-10

## 2024-12-11 RX ORDER — BUSPIRONE HYDROCHLORIDE 10 MG/1
10 TABLET ORAL 2 TIMES DAILY
Qty: 60 TABLET | Refills: 3 | Status: SHIPPED | OUTPATIENT
Start: 2024-12-11

## 2025-03-13 DIAGNOSIS — F32.0 MAJOR DEPRESSIVE DISORDER, SINGLE EPISODE, MILD: ICD-10-CM

## 2025-03-13 DIAGNOSIS — M79.7 FIBROMYALGIA: ICD-10-CM

## 2025-03-14 ENCOUNTER — TELEPHONE (OUTPATIENT)
Dept: FAMILY MEDICINE CLINIC | Age: 60
End: 2025-03-14
Payer: COMMERCIAL

## 2025-03-14 RX ORDER — DEXLANSOPRAZOLE 60 MG/1
60 CAPSULE, DELAYED RELEASE ORAL DAILY
Qty: 90 CAPSULE | Refills: 0 | Status: SHIPPED | OUTPATIENT
Start: 2025-03-14

## 2025-03-14 RX ORDER — ESCITALOPRAM OXALATE 20 MG/1
20 TABLET ORAL DAILY
Qty: 90 TABLET | Refills: 0 | Status: SHIPPED | OUTPATIENT
Start: 2025-03-14

## 2025-03-14 NOTE — TELEPHONE ENCOUNTER
PA has been submitted through epic for dexlansoprazole   Per [facility] pharmacy needs PA

## 2025-04-28 ENCOUNTER — OFFICE VISIT (OUTPATIENT)
Dept: FAMILY MEDICINE CLINIC | Age: 60
End: 2025-04-28
Payer: COMMERCIAL

## 2025-04-28 VITALS
SYSTOLIC BLOOD PRESSURE: 114 MMHG | HEART RATE: 84 BPM | BODY MASS INDEX: 30.22 KG/M2 | TEMPERATURE: 98.3 F | WEIGHT: 177 LBS | OXYGEN SATURATION: 99 % | DIASTOLIC BLOOD PRESSURE: 63 MMHG | HEIGHT: 64 IN

## 2025-04-28 DIAGNOSIS — M79.7 FIBROMYALGIA: ICD-10-CM

## 2025-04-28 DIAGNOSIS — F32.0 MAJOR DEPRESSIVE DISORDER, SINGLE EPISODE, MILD: ICD-10-CM

## 2025-04-28 DIAGNOSIS — Z78.0 MENOPAUSE: ICD-10-CM

## 2025-04-28 DIAGNOSIS — Z00.00 ANNUAL PHYSICAL EXAM: Primary | ICD-10-CM

## 2025-04-28 DIAGNOSIS — Z12.31 SCREENING MAMMOGRAM FOR BREAST CANCER: ICD-10-CM

## 2025-04-28 DIAGNOSIS — R41.3 MEMORY LOSS: ICD-10-CM

## 2025-04-28 DIAGNOSIS — Z12.11 COLON CANCER SCREENING: ICD-10-CM

## 2025-04-28 DIAGNOSIS — E55.9 VITAMIN D DEFICIENCY, UNSPECIFIED: ICD-10-CM

## 2025-04-28 DIAGNOSIS — Z23 ENCOUNTER FOR IMMUNIZATION: ICD-10-CM

## 2025-04-28 DIAGNOSIS — K21.9 GASTROESOPHAGEAL REFLUX DISEASE WITHOUT ESOPHAGITIS: ICD-10-CM

## 2025-04-28 DIAGNOSIS — Z13.6 ENCOUNTER FOR SCREENING FOR CARDIOVASCULAR DISORDERS: ICD-10-CM

## 2025-04-28 RX ORDER — FAMOTIDINE 40 MG/1
40 TABLET, FILM COATED ORAL 2 TIMES DAILY
Qty: 180 TABLET | Refills: 3 | Status: SHIPPED | OUTPATIENT
Start: 2025-04-28

## 2025-04-28 RX ORDER — BUSPIRONE HYDROCHLORIDE 10 MG/1
10 TABLET ORAL 2 TIMES DAILY
Qty: 60 TABLET | Refills: 3 | Status: SHIPPED | OUTPATIENT
Start: 2025-04-28

## 2025-04-28 RX ORDER — ESCITALOPRAM OXALATE 20 MG/1
20 TABLET ORAL DAILY
Qty: 90 TABLET | Refills: 0 | Status: SHIPPED | OUTPATIENT
Start: 2025-04-28

## 2025-04-28 NOTE — PROGRESS NOTES
Vanesa Esquivel presents to [facility].    Chief Complaint:  WWE and physical    Subjective     History of Present Illness:  HPI    Vanesa is in for her WWE, s/p hysterectomy, G4, P3, A1.  Pain with intercourse with vaginal dryness, decreased libido.  S/p hysterectomy and 1 oophrectomy due to ovarian cysts, and she has h/o abnormal Paps and positive HPV.  She has hemorrhoids.    Last COLONOSCOPY:  4/5/23 for EGD and colonoscopy  Last  EYE EXAM: vision works <1 year ago  Last MAMMOGRAM: 10/17/2023-NORMAL  Last DEXA: 7/6/22-NORMAL  Last Dental EXAM:  recc every 6 months  ETOH use: RARE  Tobacco use:  NEVER  DIET: HEALTHY  EXERCISE: walks most days  She wears her seatbelt in the car  HEARING: TINNITUS  Immunization: recc shingrix/COVID/PREVNAR 20        She presents with memory loss, sees neurology Graciela Helm and s/p MRI brain for memory loss, negative for findings.  Of note, she has a sister dx with brain tumor. B12 was normal.      She presents with chronic depression that is better, she is coping better, she c/o with home stressors including her not being happy in her current relationship/marriage.  She is actually coping well on lexapro 20 mg daily and buspar 10 mg bid and she doesn't want to adjust or add meds.  She has sleep apnea and didn't tolerate the CPAP.  She has no homicidal or suicidal ideation.   She has longstanding fibromyagia with fatigue.  She uses CBD oil which tends to help her more than anything.     She has chronic GERD, off dexilant, on pepcid 40 mg bid, has ongoing GERD symptoms, no esophagitis on EEGD, but gastritis was present.     H/O borderline elevated alkaline phosphatase at 124.      Review of Systems:  Review of Systems   Constitutional:  Positive for fatigue. Negative for chills and fever.   HENT:  Negative for ear pain, sinus pressure and sore throat.    Eyes:  Negative for blurred vision and double vision.   Respiratory:  Positive for shortness of  breath. Negative for cough and wheezing.    Cardiovascular:  Negative for chest pain, palpitations and leg swelling.   Gastrointestinal:  Positive for GERD. Negative for abdominal pain, blood in stool, constipation, diarrhea, nausea and vomiting.   Genitourinary:  Positive for vaginal pain.   Musculoskeletal:  Positive for arthralgias.   Skin:  Negative for rash.   Neurological:  Negative for dizziness and headache.   Psychiatric/Behavioral:  Positive for depressed mood and stress. Negative for sleep disturbance and suicidal ideas. The patient is nervous/anxious.         Objective     Medications:  Current Outpatient Medications on File Prior to Visit   Medication Sig    alclometasone (ACLOVATE) 0.05 % cream Apply  topically to the appropriate area as directed See Admin Instructions. apply topically to the appropriate area as directed 2 times a day    CBD (cannabidiol) oral oil Take  by mouth.    Multiple Vitamins-Minerals (EMERGEN-C IMMUNE PO) Take  by mouth.    [DISCONTINUED] busPIRone (BUSPAR) 10 MG tablet Take 1 tablet by mouth 2 (Two) Times a Day.    [DISCONTINUED] escitalopram (LEXAPRO) 20 MG tablet Take 1 tablet by mouth Daily.    [DISCONTINUED] famotidine (PEPCID) 40 MG tablet Take 1 tablet by mouth 2 (Two) Times a Day.    [DISCONTINUED] dexlansoprazole (DEXILANT) 60 MG capsule Take 1 capsule by mouth Daily. (Patient not taking: Reported on 4/28/2025)    [DISCONTINUED] estradiol (Vagifem) 10 MCG tablet vaginal tablet Insert 1 tablet into the vagina 2 (Two) Times a Week. (Patient not taking: Reported on 4/28/2025)    [DISCONTINUED] meclizine (ANTIVERT) 12.5 MG tablet Take 1 tablet by mouth 3 (Three) Times a Day As Needed for Dizziness. (Patient not taking: Reported on 10/7/2024)     No current facility-administered medications on file prior to visit.       Vital Signs:   /63 (BP Location: Right arm, Patient Position: Sitting, Cuff Size: Adult)   Pulse 84   Temp 98.3 °F (36.8 °C) (Oral)   Ht 162.6 cm  "(64.02\")   Wt 80.3 kg (177 lb)   SpO2 99%   BMI 30.37 kg/m²       Physical Exam:  Physical Exam  Vitals and nursing note reviewed.   Constitutional:       General: She is not in acute distress.     Appearance: Normal appearance. She is not ill-appearing, toxic-appearing or diaphoretic.   HENT:      Head: Normocephalic and atraumatic.      Right Ear: Tympanic membrane, ear canal and external ear normal.      Left Ear: Tympanic membrane, ear canal and external ear normal.      Nose: Nose normal. No congestion or rhinorrhea.      Mouth/Throat:      Pharynx: Oropharynx is clear. No oropharyngeal exudate or posterior oropharyngeal erythema.   Eyes:      Conjunctiva/sclera: Conjunctivae normal.   Cardiovascular:      Rate and Rhythm: Normal rate.      Pulses: Normal pulses.   Pulmonary:      Effort: Pulmonary effort is normal. No respiratory distress.      Breath sounds: Normal breath sounds. No stridor. No wheezing, rhonchi or rales.   Chest:   Breasts:     Right: Normal.      Left: Normal.   Musculoskeletal:         General: Normal range of motion.      Cervical back: Normal range of motion and neck supple. No rigidity.   Lymphadenopathy:      Cervical: No cervical adenopathy.      Upper Body:      Right upper body: No axillary adenopathy.      Left upper body: No axillary adenopathy.   Skin:     General: Skin is warm and dry.      Capillary Refill: Capillary refill takes less than 2 seconds.   Neurological:      Mental Status: She is alert and oriented to person, place, and time.   Psychiatric:         Mood and Affect: Mood normal.         Behavior: Behavior normal.         Result Review   The following data was reviewed by Fina Bardales MD on 04/03/2023.  Lab Results   Component Value Date    WBC 7.36 04/23/2024    HGB 13.2 04/23/2024    HCT 39.1 04/23/2024    MCV 86.5 04/23/2024     04/23/2024     Lab Results   Component Value Date    GLUCOSE 97 10/30/2024    BUN 9 10/30/2024    CREATININE 0.81 " "10/30/2024    EGFR 84.3 10/30/2024    BCR 11.1 10/30/2024    K 4.3 10/30/2024    CO2 28.3 10/30/2024    CALCIUM 9.4 10/30/2024    ALBUMIN 4.1 10/30/2024    BILITOT 0.3 10/30/2024    AST 21 10/30/2024    ALT 20 10/30/2024     Lab Results   Component Value Date    CHOL 155 04/23/2024    CHLPL 169 04/01/2021    TRIG 93 04/23/2024    HDL 82 (H) 04/23/2024    LDL 56 04/23/2024     Lab Results   Component Value Date    TSH 1.020 10/30/2024     No results found for: \"HGBA1C\"  No results found for: \"PSA\"               Assessment and Plan:       Diagnoses and all orders for this visit:    1. Annual physical exam (Primary)    2. Encounter for immunization  Comments:  Recommend Shingrix, COVID and Prevnar 20    3. Colon cancer screening  Comments:  Colonoscopy is up-to-date    4. Menopause  Comments:  Screening DEXA ordered  Orders:  -     DEXA Bone Density Axial; Future    5. Screening mammogram for breast cancer  Comments:  Any mammogram ordered, breast exam WNL  Orders:  -     Mammo Screening Digital Tomosynthesis Bilateral With CAD; Future    6. Gastroesophageal reflux disease without esophagitis  Comments:  Currently stable on Pepcid 40 mg twice daily, off dexilant, she needs to avoid spicy and acidic foods in her diet  Orders:  -     famotidine (PEPCID) 40 MG tablet; Take 1 tablet by mouth 2 (Two) Times a Day.  Dispense: 180 tablet; Refill: 3    7. Fibromyalgia  Comments:  Chronic pain is stable and she is functional on lexapro.  Continue current meds  Orders:  -     escitalopram (LEXAPRO) 20 MG tablet; Take 1 tablet by mouth Daily.  Dispense: 90 tablet; Refill: 0  -     busPIRone (BUSPAR) 10 MG tablet; Take 1 tablet by mouth 2 (Two) Times a Day.  Dispense: 60 tablet; Refill: 3    8. Major depressive disorder, single episode, mild  Comments:  Stable and she is in a good place.  Continue Lexapro and BuSpar as directed denies HI/SI.  Sleeping okay at night  Orders:  -     escitalopram (LEXAPRO) 20 MG tablet; Take 1 tablet " by mouth Daily.  Dispense: 90 tablet; Refill: 0  -     busPIRone (BUSPAR) 10 MG tablet; Take 1 tablet by mouth 2 (Two) Times a Day.  Dispense: 60 tablet; Refill: 3    9. Vitamin D deficiency, unspecified  Comments:  Currently on vitamin D supplementation.  Will check labs and adjust Tx plan pending results  Orders:  -     Vitamin D,25-Hydroxy; Future    10. Memory loss  Comments:  She is seen neurology with no etiology found, B12 labs have been normal  Orders:  -     TSH+Free T4; Future    11. Encounter for screening for cardiovascular disorders  -     Comprehensive Metabolic Panel; Future  -     CBC (No Diff); Future  -     Lipid Panel; Future            Follow Up   Return in about 6 months (around 10/28/2025) for Recheck.           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 ENDOSCOPY;  Service: Gastroenterology;  Laterality: N/A;  HEMORRHOIDS    ENDOSCOPY    Procedure: ESOPHAGOGASTRODUODENOSCOPY WITH BIOPSIES;  Surgeon: Melanie Thomas MD;  Location: [facility] ENDOSCOPY;  Service: Gastroenterology;  Laterality: N/A;  GASTRITIS    HYSTERECTOMY    ABN PAP    MOUTH SURGERY     expanded palate    OVARIAN CYST SURGERY    Unsure which ovary    TUBAL ABDOMINAL LIGATION    VULVA BIOPSY    Dysplasia    WISDOM TOOTH EXTRACTION      Family History   Problem Relation Age of Onset    Stroke Father     Alcohol abuse Father     Diabetes Father     Hyperlipidemia Father     Alcohol abuse Mother     Heart disease Mother     Hypertension Mother     Cancer Paternal Grandfather     Stroke Paternal Grandmother     Parkinsonism Paternal Grandmother     Heart attack Maternal Grandmother     Cancer Maternal Grandfather     Breast cancer Neg Hx     Ovarian cancer Neg Hx     Uterine cancer Neg Hx     Colon cancer Neg Hx     Prostate cancer Neg Hx      Social History     Tobacco Use    Smoking status: Never     Passive exposure: Never    Smokeless tobacco: Never   Substance Use Topics    Alcohol use: Not Currently       There are no preventive care reminders to display for this patient.         Immunization History   Administered Date(s) Administered    COVID-19 (MODERNA) 1st,2nd,3rd Dose Monovalent 02/12/2021, 03/12/2021    COVID-19 (MODERNA) Monovalent Original Booster 12/10/2021, 04/12/2022    COVID-19 (PFIZER) 12YRS+ (COMIRNATY) 10/23/2023    Flu Vaccine Intradermal Quad 18-64YR 09/15/2020    Fluzone (or Fluarix & Flulaval for VFC) >6mos 10/27/2022, 10/23/2023    Influenza Injectable Mdck Pf Quad 09/15/2020    Influenza, Unspecified 09/17/2020, 10/27/2022    Tdap 06/02/2022       Allergies   Allergen Reactions    Carafate [Sucralfate] Other (See Comments)     Tightness around her throat, dry mouth

## 2025-05-30 ENCOUNTER — TELEPHONE (OUTPATIENT)
Dept: FAMILY MEDICINE CLINIC | Age: 60
End: 2025-05-30
Payer: COMMERCIAL

## 2025-06-03 ENCOUNTER — HOSPITAL ENCOUNTER (OUTPATIENT)
Dept: BONE DENSITY | Facility: HOSPITAL | Age: 60
Discharge: HOME | End: 2025-06-03
Payer: COMMERCIAL

## 2025-06-03 ENCOUNTER — HOSPITAL ENCOUNTER (OUTPATIENT)
Dept: MAMMOGRAPHY | Facility: HOSPITAL | Age: 60
Discharge: HOME | End: 2025-06-03
Payer: COMMERCIAL

## 2025-06-03 DIAGNOSIS — Z12.31 SCREENING MAMMOGRAM FOR BREAST CANCER: ICD-10-CM

## 2025-06-03 DIAGNOSIS — Z78.0 MENOPAUSE: ICD-10-CM

## 2025-06-03 PROCEDURE — 77067 SCR MAMMO BI INCL CAD: CPT

## 2025-06-03 PROCEDURE — 77080 DXA BONE DENSITY AXIAL: CPT

## 2025-06-03 PROCEDURE — 77063 BREAST TOMOSYNTHESIS BI: CPT

## 2025-06-04 ENCOUNTER — LAB (OUTPATIENT)
Dept: LAB | Facility: HOSPITAL | Age: 60
End: 2025-06-04
Payer: COMMERCIAL

## 2025-06-04 DIAGNOSIS — Z13.6 ENCOUNTER FOR SCREENING FOR CARDIOVASCULAR DISORDERS: ICD-10-CM

## 2025-06-04 DIAGNOSIS — E55.9 VITAMIN D DEFICIENCY, UNSPECIFIED: ICD-10-CM

## 2025-06-04 DIAGNOSIS — R41.3 MEMORY LOSS: ICD-10-CM

## 2025-06-04 LAB
25(OH)D3 SERPL-MCNC: 41.7 NG/ML (ref 30–100)
ALBUMIN SERPL-MCNC: 4.1 G/DL (ref 3.5–5.2)
ALBUMIN/GLOB SERPL: 1.6 G/DL
ALP SERPL-CCNC: 105 U/L (ref 39–117)
ALT SERPL W P-5'-P-CCNC: 13 U/L (ref 1–33)
ANION GAP SERPL CALCULATED.3IONS-SCNC: 9.1 MMOL/L (ref 5–15)
AST SERPL-CCNC: 15 U/L (ref 1–32)
BILIRUB SERPL-MCNC: 0.5 MG/DL (ref 0–1.2)
BUN SERPL-MCNC: 8 MG/DL (ref 6–20)
BUN/CREAT SERPL: 10.5 (ref 7–25)
CALCIUM SPEC-SCNC: 9.2 MG/DL (ref 8.6–10.5)
CHLORIDE SERPL-SCNC: 105 MMOL/L (ref 98–107)
CHOLEST SERPL-MCNC: 152 MG/DL (ref 0–200)
CO2 SERPL-SCNC: 25.9 MMOL/L (ref 22–29)
CREAT SERPL-MCNC: 0.76 MG/DL (ref 0.57–1)
DEPRECATED RDW RBC AUTO: 39.4 FL (ref 37–54)
EGFRCR SERPLBLD CKD-EPI 2021: 91 ML/MIN/1.73
ERYTHROCYTE [DISTWIDTH] IN BLOOD BY AUTOMATED COUNT: 12 % (ref 12.3–15.4)
GLOBULIN UR ELPH-MCNC: 2.6 GM/DL
GLUCOSE SERPL-MCNC: 90 MG/DL (ref 65–99)
HCT VFR BLD AUTO: 39.7 % (ref 34–46.6)
HDLC SERPL-MCNC: 84 MG/DL (ref 40–60)
HGB BLD-MCNC: 13.2 G/DL (ref 12–15.9)
LDLC SERPL CALC-MCNC: 52 MG/DL (ref 0–100)
LDLC/HDLC SERPL: 0.6 {RATIO}
MCH RBC QN AUTO: 29.3 PG (ref 26.6–33)
MCHC RBC AUTO-ENTMCNC: 33.2 G/DL (ref 31.5–35.7)
MCV RBC AUTO: 88 FL (ref 79–97)
PLATELET # BLD AUTO: 275 10*3/MM3 (ref 140–450)
PMV BLD AUTO: 8.8 FL (ref 6–12)
POTASSIUM SERPL-SCNC: 4.3 MMOL/L (ref 3.5–5.2)
PROT SERPL-MCNC: 6.7 G/DL (ref 6–8.5)
RBC # BLD AUTO: 4.51 10*6/MM3 (ref 3.77–5.28)
SODIUM SERPL-SCNC: 140 MMOL/L (ref 136–145)
T4 FREE SERPL-MCNC: 1.18 NG/DL (ref 0.92–1.68)
TRIGL SERPL-MCNC: 87 MG/DL (ref 0–150)
TSH SERPL DL<=0.05 MIU/L-ACNC: 1.01 UIU/ML (ref 0.27–4.2)
VLDLC SERPL-MCNC: 16 MG/DL (ref 5–40)
WBC NRBC COR # BLD AUTO: 5.34 10*3/MM3 (ref 3.4–10.8)

## 2025-06-04 PROCEDURE — 80050 GENERAL HEALTH PANEL: CPT

## 2025-06-04 PROCEDURE — 80061 LIPID PANEL: CPT

## 2025-06-04 PROCEDURE — 36415 COLL VENOUS BLD VENIPUNCTURE: CPT

## 2025-06-04 PROCEDURE — 84439 ASSAY OF FREE THYROXINE: CPT

## 2025-06-04 PROCEDURE — 82306 VITAMIN D 25 HYDROXY: CPT

## (undated) DEVICE — BLCK/BITE BLOX WO/DENTL/RIM W/STRAP 54F

## (undated) DEVICE — CONN JET HYDRA H20 AUXILIARY DISP

## (undated) DEVICE — SOLIDIFIER LIQLOC PLS 1500CC BT

## (undated) DEVICE — SINGLE-USE BIOPSY FORCEPS: Brand: RADIAL JAW 4

## (undated) DEVICE — Device

## (undated) DEVICE — SOL IRRG H2O PL/BG 1000ML STRL

## (undated) DEVICE — LINER SURG CANSTR SXN S/RIGD 1500CC

## (undated) DEVICE — Device: Brand: DEFENDO AIR/WATER/SUCTION AND BIOPSY VALVE